# Patient Record
Sex: FEMALE | Race: WHITE | NOT HISPANIC OR LATINO | Employment: FULL TIME | URBAN - METROPOLITAN AREA
[De-identification: names, ages, dates, MRNs, and addresses within clinical notes are randomized per-mention and may not be internally consistent; named-entity substitution may affect disease eponyms.]

---

## 2017-09-21 ENCOUNTER — HOSPITAL ENCOUNTER (EMERGENCY)
Facility: MEDICAL CENTER | Age: 32
End: 2017-09-21
Attending: EMERGENCY MEDICINE
Payer: COMMERCIAL

## 2017-09-21 ENCOUNTER — APPOINTMENT (OUTPATIENT)
Dept: RADIOLOGY | Facility: MEDICAL CENTER | Age: 32
End: 2017-09-21
Attending: EMERGENCY MEDICINE
Payer: COMMERCIAL

## 2017-09-21 VITALS
WEIGHT: 149.69 LBS | RESPIRATION RATE: 16 BRPM | TEMPERATURE: 98.5 F | BODY MASS INDEX: 23.49 KG/M2 | SYSTOLIC BLOOD PRESSURE: 109 MMHG | HEIGHT: 67 IN | HEART RATE: 70 BPM | OXYGEN SATURATION: 98 % | DIASTOLIC BLOOD PRESSURE: 78 MMHG

## 2017-09-21 DIAGNOSIS — N83.209 HEMORRHAGIC OVARIAN CYST: ICD-10-CM

## 2017-09-21 DIAGNOSIS — N83.201 RIGHT OVARIAN CYST: ICD-10-CM

## 2017-09-21 DIAGNOSIS — R10.2 PELVIC PAIN: ICD-10-CM

## 2017-09-21 LAB
APPEARANCE UR: CLEAR
BILIRUB UR QL STRIP.AUTO: NEGATIVE
COLOR UR: YELLOW
CULTURE IF INDICATED INDCX: NO UA CULTURE
GLUCOSE UR STRIP.AUTO-MCNC: NEGATIVE MG/DL
HCG UR QL: NEGATIVE
KETONES UR STRIP.AUTO-MCNC: NEGATIVE MG/DL
LEUKOCYTE ESTERASE UR QL STRIP.AUTO: NEGATIVE
MICRO URNS: NORMAL
NITRITE UR QL STRIP.AUTO: NEGATIVE
PH UR STRIP.AUTO: 5.5 [PH]
PROT UR QL STRIP: NEGATIVE MG/DL
RBC UR QL AUTO: NEGATIVE
SP GR UR REFRACTOMETRY: 1.02
SP GR UR STRIP.AUTO: 1.02

## 2017-09-21 PROCEDURE — 76830 TRANSVAGINAL US NON-OB: CPT

## 2017-09-21 PROCEDURE — 81025 URINE PREGNANCY TEST: CPT

## 2017-09-21 PROCEDURE — 99284 EMERGENCY DEPT VISIT MOD MDM: CPT

## 2017-09-21 PROCEDURE — 96372 THER/PROPH/DIAG INJ SC/IM: CPT

## 2017-09-21 PROCEDURE — 81003 URINALYSIS AUTO W/O SCOPE: CPT

## 2017-09-21 PROCEDURE — 700111 HCHG RX REV CODE 636 W/ 250 OVERRIDE (IP): Performed by: EMERGENCY MEDICINE

## 2017-09-21 RX ORDER — KETOROLAC TROMETHAMINE 30 MG/ML
30 INJECTION, SOLUTION INTRAMUSCULAR; INTRAVENOUS ONCE
Status: COMPLETED | OUTPATIENT
Start: 2017-09-21 | End: 2017-09-21

## 2017-09-21 RX ORDER — HYDROCODONE BITARTRATE AND ACETAMINOPHEN 5; 325 MG/1; MG/1
1-2 TABLET ORAL EVERY 6 HOURS PRN
Qty: 16 TAB | Refills: 0 | Status: ON HOLD | OUTPATIENT
Start: 2017-09-21 | End: 2017-10-04

## 2017-09-21 RX ADMIN — KETOROLAC TROMETHAMINE 30 MG: 30 INJECTION, SOLUTION INTRAMUSCULAR at 19:02

## 2017-09-21 ASSESSMENT — PAIN SCALES - GENERAL: PAINLEVEL_OUTOF10: 4

## 2017-09-22 NOTE — ED PROVIDER NOTES
CHIEF COMPLAINT  Chief Complaint   Patient presents with   • Ovarian Cyst     right       HPI  Mckenzie Cash is a 32 y.o. female who presentsWith right adnexal pain. Reports that it is similar to prior episode of ovarian cysts. Has been monitored closely for ovarian cysts since May. Has had pelvic ultrasounds every 6 weeks since time of diagnosis. Last pelvic ultrasound was approximately 4 weeks ago. She states that throughout all of these ultrasounds studies, the ovarian cyst has been rather consistent and not resolved. She was followed by a gynecologist however the group where she was followed has disbanded and she was referred to a new gynecologist, to be seen in the next 2 weeks, Dr. Gonzalez. She notes that her prior insurance covered her at [facility] however this is no longer the case and she was referred to a [facility] affiliated gynecologist. Has not yet had discussions regarding excision of the cyst with a gynecologist.    She reports that over the past few days she has had worsening right adnexal pain. Denies any vaginal bleeding or discharge. Has a history of IUD placement and has not had menstrual cycle for approximately one year.    The patient denies fevers, nausea, vomiting. Was seen in an emergency department in August when she was prescribed Norco for her pain symptoms however she has only been taking them occasionally due to cognitive impairment while at work and while driving and while taking care of her children.    She does note that in her 20s, she had a laparoscopic procedure to evaluate for possible endometriosis. Showed scarring however no active lesions. Denies other abdominal surgeries in the past such as appendectomy.    REVIEW OF SYSTEMS  See HPI for further details. All other systems are negative.     PAST MEDICAL HISTORY   has a past medical history of Cold (6/28/11); Generalized headaches; Plantar fasciitis; and Tendonitis.    SOCIAL HISTORY  Social History     Social History Main  "Topics   • Smoking status: Former Smoker     Packs/day: 0.50     Years: 2.00     Types: Cigarettes     Quit date: 1/15/2011   • Smokeless tobacco: Not on file   • Alcohol use Yes      Comment: 3 per month   • Drug use: No   • Sexual activity: Not on file       SURGICAL HISTORY   has a past surgical history that includes laparoscopy (7/22/2011).    CURRENT MEDICATIONS  Home Medications    **Home medications have not yet been reviewed for this encounter**         ALLERGIES  Allergies   Allergen Reactions   • Nkda [No Known Drug Allergy]        PHYSICAL EXAM  VITAL SIGNS: /78   Pulse 83   Temp 36.9 °C (98.5 °F)   Resp 16   Ht 1.702 m (5' 7\")   Wt 67.9 kg (149 lb 11.1 oz)   SpO2 100%   BMI 23.45 kg/m²   Pulse ox interpretation: I interpret this pulse ox as normal.  Constitutional: Alert in no apparent distress.  HENT: No signs of trauma, Bilateral external ears normal, Nose normal.   Eyes: Pupils are equal and reactive, Conjunctiva normal, Non-icteric.   Neck: Normal range of motion, No tenderness, Supple, No stridor.   Lymphatic: No lymphadenopathy noted.   Cardiovascular: Regular rate and rhythm, no murmurs.   Thorax & Lungs: Normal breath sounds, No respiratory distress, No wheezing, No chest tenderness.   Abdomen: Bowel sounds normal, Soft, Right lower quadrant tenderness, No masses, No pulsatile masses. No peritoneal signs.  Skin: Warm, Dry, No erythema, No rash.   Back: No bony tenderness, No CVA tenderness.   Extremities: Intact distal pulses, No edema, No tenderness, No cyanosis  Musculoskeletal: Good range of motion in all major joints. No tenderness to palpation or major deformities noted.   Neurologic: Alert , Normal motor function and gait, Normal sensory function, No focal deficits noted.   Psychiatric: Affect normal, Judgment normal, Mood normal.       DIAGNOSTIC STUDIES / PROCEDURES      LABS  Labs Reviewed   URINALYSIS,CULTURE IF INDICATED   HCG QUALITATIVE UR   REFRACTOMETER SG "       RADIOLOGY  US-GYN-PELVIS TRANSVAGINAL   Final Result      2 cm hemorrhagic cyst identified in the right ovary.      IUD in place.          COURSE & MEDICAL DECISION MAKING  Pertinent Labs & Imaging studies reviewed. (See chart for details)  32 y.o. female presenting with chronic right lower quadrant tenderness for the past several months. Has been on and off however more frequent over the past several weeks. Has had multiple ultrasounds to evaluate her ovarian cyst that is known to her right adnexal region. She is not pregnant. Has an IUD in place.    Presentation is not consistent with appendicitis. No fever or vomiting. Has been coming and going for several months now. No vaginal bleeding or discharge. Unlikely TOA, PID. No presence of femoral hernia or other inguinal hernia.     Urinalysis does not show evidence of a urinary tract infection.    Ultrasound of the ovaries were obtained. No evidence of ovarian torsion. Did show 2 cm hemorrhagic cyst to the right ovary. This is likely the cause of the patient's abdominal discomfort at this time.She does have follow-up with a GYN physician within the next 2 weeks. She was instructed to follow up with that physician for further management. May require excision of her ovarian cyst if it continues to cause her discomfort. This should be discussed with GYN.    Given the patient's unremarkable vital signs and lack of current distress and no peritoneal findings or suggestion of surgical abdomen, no indication of CT at this time. She did have a prior history of possible endometriosis with a negative laparoscopy. This may be contributing to her symptoms as well.    Regarding this, the patient does appear stable for discharge at this time. Results were reviewed with the patient. She was instructed to follow-up with gynecologist regarding her ultrasound results and regarding further management. She was given strict return precautions however for any worsening of her  "symptoms or development of any other concerning signs or symptoms. She was informed that she does have a slightly increased chance of ovarian torsion given presence of cyst on the ovary. No evidence of this today.    The patient will not drink alcohol nor drive with prescribed medications.   The patient will return for worsening symptoms or failure of improvement and is stable at the time of discharge. The patient verbalizes understanding in their own words.    ,/78   Pulse 70   Temp 36.9 °C (98.5 °F)   Resp 16   Ht 1.702 m (5' 7\")   Wt 67.9 kg (149 lb 11.1 oz)   SpO2 98%   BMI 23.45 kg/m²     Rogerio Monaco M.D.  [address]  [place] [address]  [phone]    In 2 days      [facility], Emergency Dept  [address]  [facility] [address] [phone]    As needed, If symptoms worsen    Miriam Gonzalez M.D.  [address]  [address]  [place] [address]  [phone]    Schedule an appointment as soon as possible for a visit        FINAL IMPRESSION  1. Right ovarian cyst    2. Hemorrhagic ovarian cyst    3. Pelvic pain            Electronically signed by: Tony Plascencia, 9/21/2017 5:53 PM    "

## 2017-09-22 NOTE — ED NOTES
Pt rounding upon return from u/s. Vs as charted, states has taken approx 1600mg motrin today. Denies ability or need to take po pain meds (narc) at thistime. Await results

## 2017-09-22 NOTE — DISCHARGE INSTRUCTIONS
Pelvic Pain, Female  Female pelvic pain can be caused by many different things and start from a variety of places. Pelvic pain refers to pain that is located in the lower half of the abdomen and between your hips. The pain may occur over a short period of time (acute) or may be reoccurring (chronic). The cause of pelvic pain may be related to disorders affecting the female reproductive organs (gynecologic), but it may also be related to the bladder, kidney stones, an intestinal complication, or muscle or skeletal problems. Getting help right away for pelvic pain is important, especially if there has been severe, sharp, or a sudden onset of unusual pain. It is also important to get help right away because some types of pelvic pain can be life threatening.   CAUSES   Below are only some of the causes of pelvic pain. The causes of pelvic pain can be in one of several categories.   · Gynecologic.  ¨ Pelvic inflammatory disease.  ¨ Sexually transmitted infection.  ¨ Ovarian cyst or a twisted ovarian ligament (ovarian torsion).  ¨ Uterine lining that grows outside the uterus (endometriosis).  ¨ Fibroids, cysts, or tumors.  ¨ Ovulation.  · Pregnancy.  ¨ Pregnancy that occurs outside the uterus (ectopic pregnancy).  ¨ Miscarriage.  ¨ Labor.  ¨ Abruption of the placenta or ruptured uterus.  · Infection.  ¨ Uterine infection (endometritis).  ¨ Bladder infection.  ¨ Diverticulitis.  ¨ Miscarriage related to a uterine infection (septic ).  · Bladder.  ¨ Inflammation of the bladder (cystitis).  ¨ Kidney stone(s).  · Gastrointestinal.  ¨ Constipation.  ¨ Diverticulitis.  · Neurologic.  ¨ Trauma.  ¨ Feeling pelvic pain because of mental or emotional causes (psychosomatic).  · Cancers of the bowel or pelvis.  EVALUATION   Your caregiver will want to take a careful history of your concerns. This includes recent changes in your health, a careful gynecologic history of your periods (menses), and a sexual history. Obtaining  your family history and medical history is also important. Your caregiver may suggest a pelvic exam. A pelvic exam will help identify the location and severity of the pain. It also helps in the evaluation of which organ system may be involved. In order to identify the cause of the pelvic pain and be properly treated, your caregiver may order tests. These tests may include:   · A pregnancy test.  · Pelvic ultrasonography.  · An X-ray exam of the abdomen.  · A urinalysis or evaluation of vaginal discharge.  · Blood tests.  HOME CARE INSTRUCTIONS   · Only take over-the-counter or prescription medicines for pain, discomfort, or fever as directed by your caregiver.    · Rest as directed by your caregiver.    · Eat a balanced diet.    · Drink enough fluids to make your urine clear or pale yellow, or as directed.    · Avoid sexual intercourse if it causes pain.    · Apply warm or cold compresses to the lower abdomen depending on which one helps the pain.    · Avoid stressful situations.    · Keep a journal of your pelvic pain. Write down when it started, where the pain is located, and if there are things that seem to be associated with the pain, such as food or your menstrual cycle.  · Follow up with your caregiver as directed.    SEEK MEDICAL CARE IF:  · Your medicine does not help your pain.  · You have abnormal vaginal discharge.  SEEK IMMEDIATE MEDICAL CARE IF:   · You have heavy bleeding from the vagina.    · Your pelvic pain increases.    · You feel light-headed or faint.    · You have chills.    · You have pain with urination or blood in your urine.    · You have uncontrolled diarrhea or vomiting.    · You have a fever or persistent symptoms for more than 3 days.  · You have a fever and your symptoms suddenly get worse.    · You are being physically or sexually abused.    MAKE SURE YOU:  · Understand these instructions.  · Will watch your condition.  · Will get help if you are not doing well or get worse.     This  information is not intended to replace advice given to you by your health care provider. Make sure you discuss any questions you have with your health care provider.     Document Released: 11/14/2005 Document Revised: 05/03/2016 Document Reviewed: 04/08/2013  [employer] Interactive Patient Education ©2016 [employer]

## 2017-09-22 NOTE — ED NOTES
Dc instructions and prescription reviewed with pt. To f/u with pcp and gyn, return for worsening s/s

## 2017-09-22 NOTE — ED NOTES
Piv, epidural and urinary cath noted in [facility] not present on this er visit, therefore dc'd in epic at this time

## 2017-10-02 DIAGNOSIS — Z01.812 PRE-OPERATIVE LABORATORY EXAMINATION: ICD-10-CM

## 2017-10-02 LAB
ANION GAP SERPL CALC-SCNC: 9 MMOL/L (ref 0–11.9)
BASOPHILS # BLD AUTO: 0.5 % (ref 0–1.8)
BASOPHILS # BLD: 0.04 K/UL (ref 0–0.12)
BUN SERPL-MCNC: 12 MG/DL (ref 8–22)
CALCIUM SERPL-MCNC: 9.7 MG/DL (ref 8.5–10.5)
CHLORIDE SERPL-SCNC: 108 MMOL/L (ref 96–112)
CO2 SERPL-SCNC: 24 MMOL/L (ref 20–33)
CREAT SERPL-MCNC: 0.76 MG/DL (ref 0.5–1.4)
EOSINOPHIL # BLD AUTO: 0.07 K/UL (ref 0–0.51)
EOSINOPHIL NFR BLD: 0.9 % (ref 0–6.9)
ERYTHROCYTE [DISTWIDTH] IN BLOOD BY AUTOMATED COUNT: 41.1 FL (ref 35.9–50)
GFR SERPL CREATININE-BSD FRML MDRD: >60 ML/MIN/1.73 M 2
GLUCOSE SERPL-MCNC: 77 MG/DL (ref 65–99)
HCG SERPL QL: NEGATIVE
HCT VFR BLD AUTO: 42.5 % (ref 37–47)
HGB BLD-MCNC: 14.2 G/DL (ref 12–16)
IMM GRANULOCYTES # BLD AUTO: 0.02 K/UL (ref 0–0.11)
IMM GRANULOCYTES NFR BLD AUTO: 0.3 % (ref 0–0.9)
LYMPHOCYTES # BLD AUTO: 1.93 K/UL (ref 1–4.8)
LYMPHOCYTES NFR BLD: 25.2 % (ref 22–41)
MCH RBC QN AUTO: 29.8 PG (ref 27–33)
MCHC RBC AUTO-ENTMCNC: 33.4 G/DL (ref 33.6–35)
MCV RBC AUTO: 89.3 FL (ref 81.4–97.8)
MONOCYTES # BLD AUTO: 0.53 K/UL (ref 0–0.85)
MONOCYTES NFR BLD AUTO: 6.9 % (ref 0–13.4)
NEUTROPHILS # BLD AUTO: 5.06 K/UL (ref 2–7.15)
NEUTROPHILS NFR BLD: 66.2 % (ref 44–72)
NRBC # BLD AUTO: 0 K/UL
NRBC BLD AUTO-RTO: 0 /100 WBC
PLATELET # BLD AUTO: 243 K/UL (ref 164–446)
PMV BLD AUTO: 10.9 FL (ref 9–12.9)
POTASSIUM SERPL-SCNC: 3.9 MMOL/L (ref 3.6–5.5)
RBC # BLD AUTO: 4.76 M/UL (ref 4.2–5.4)
SODIUM SERPL-SCNC: 141 MMOL/L (ref 135–145)
WBC # BLD AUTO: 7.7 K/UL (ref 4.8–10.8)

## 2017-10-02 PROCEDURE — 36415 COLL VENOUS BLD VENIPUNCTURE: CPT

## 2017-10-02 PROCEDURE — 85025 COMPLETE CBC W/AUTO DIFF WBC: CPT

## 2017-10-02 PROCEDURE — 84703 CHORIONIC GONADOTROPIN ASSAY: CPT

## 2017-10-02 PROCEDURE — 80048 BASIC METABOLIC PNL TOTAL CA: CPT

## 2017-10-04 ENCOUNTER — HOSPITAL ENCOUNTER (OUTPATIENT)
Facility: MEDICAL CENTER | Age: 32
End: 2017-10-04
Attending: OBSTETRICS & GYNECOLOGY | Admitting: OBSTETRICS & GYNECOLOGY
Payer: COMMERCIAL

## 2017-10-04 VITALS
BODY MASS INDEX: 22.91 KG/M2 | HEART RATE: 89 BPM | SYSTOLIC BLOOD PRESSURE: 99 MMHG | TEMPERATURE: 97 F | HEIGHT: 67 IN | DIASTOLIC BLOOD PRESSURE: 68 MMHG | WEIGHT: 145.94 LBS | RESPIRATION RATE: 18 BRPM | OXYGEN SATURATION: 98 %

## 2017-10-04 PROBLEM — N83.291 COMPLEX CYST OF RIGHT OVARY: Status: ACTIVE | Noted: 2017-10-04

## 2017-10-04 PROCEDURE — 500025 HCHG ARMREST, CRADLE FOAM: Performed by: OBSTETRICS & GYNECOLOGY

## 2017-10-04 PROCEDURE — 160047 HCHG PACU  - EA ADDL 30 MINS PHASE II: Performed by: OBSTETRICS & GYNECOLOGY

## 2017-10-04 PROCEDURE — 700104 HCHG RX REV CODE 254

## 2017-10-04 PROCEDURE — 700101 HCHG RX REV CODE 250

## 2017-10-04 PROCEDURE — 160041 HCHG SURGERY MINUTES - EA ADDL 1 MIN LEVEL 4: Performed by: OBSTETRICS & GYNECOLOGY

## 2017-10-04 PROCEDURE — 700111 HCHG RX REV CODE 636 W/ 250 OVERRIDE (IP)

## 2017-10-04 PROCEDURE — 160046 HCHG PACU - 1ST 60 MINS PHASE II: Performed by: OBSTETRICS & GYNECOLOGY

## 2017-10-04 PROCEDURE — 700102 HCHG RX REV CODE 250 W/ 637 OVERRIDE(OP)

## 2017-10-04 PROCEDURE — 160025 RECOVERY II MINUTES (STATS): Performed by: OBSTETRICS & GYNECOLOGY

## 2017-10-04 PROCEDURE — 501838 HCHG SUTURE GENERAL: Performed by: OBSTETRICS & GYNECOLOGY

## 2017-10-04 PROCEDURE — 160009 HCHG ANES TIME/MIN: Performed by: OBSTETRICS & GYNECOLOGY

## 2017-10-04 PROCEDURE — 160029 HCHG SURGERY MINUTES - 1ST 30 MINS LEVEL 4: Performed by: OBSTETRICS & GYNECOLOGY

## 2017-10-04 PROCEDURE — 501572 HCHG TROCAR, SHIELD OBTU 5X100: Performed by: OBSTETRICS & GYNECOLOGY

## 2017-10-04 PROCEDURE — 160036 HCHG PACU - EA ADDL 30 MINS PHASE I: Performed by: OBSTETRICS & GYNECOLOGY

## 2017-10-04 PROCEDURE — 160002 HCHG RECOVERY MINUTES (STAT): Performed by: OBSTETRICS & GYNECOLOGY

## 2017-10-04 PROCEDURE — 160048 HCHG OR STATISTICAL LEVEL 1-5: Performed by: OBSTETRICS & GYNECOLOGY

## 2017-10-04 PROCEDURE — A9270 NON-COVERED ITEM OR SERVICE: HCPCS

## 2017-10-04 PROCEDURE — 500868 HCHG NEEDLE, SURGI(VARES): Performed by: OBSTETRICS & GYNECOLOGY

## 2017-10-04 PROCEDURE — 160035 HCHG PACU - 1ST 60 MINS PHASE I: Performed by: OBSTETRICS & GYNECOLOGY

## 2017-10-04 PROCEDURE — A4314 CATH W/DRAINAGE 2-WAY LATEX: HCPCS | Performed by: OBSTETRICS & GYNECOLOGY

## 2017-10-04 PROCEDURE — 501688 HCHG UTERINE MANIPULATOR RUMI: Performed by: OBSTETRICS & GYNECOLOGY

## 2017-10-04 PROCEDURE — 500886 HCHG PACK, LAPAROSCOPY: Performed by: OBSTETRICS & GYNECOLOGY

## 2017-10-04 PROCEDURE — 501582 HCHG TROCAR, THRD BLADED: Performed by: OBSTETRICS & GYNECOLOGY

## 2017-10-04 RX ORDER — LIDOCAINE AND PRILOCAINE 25; 25 MG/G; MG/G
1 CREAM TOPICAL
Status: COMPLETED | OUTPATIENT
Start: 2017-10-04 | End: 2017-10-04

## 2017-10-04 RX ORDER — SODIUM CHLORIDE, SODIUM LACTATE, POTASSIUM CHLORIDE, CALCIUM CHLORIDE 600; 310; 30; 20 MG/100ML; MG/100ML; MG/100ML; MG/100ML
INJECTION, SOLUTION INTRAVENOUS CONTINUOUS
Status: DISCONTINUED | OUTPATIENT
Start: 2017-10-04 | End: 2017-10-04 | Stop reason: HOSPADM

## 2017-10-04 RX ORDER — OXYCODONE HCL 5 MG/5 ML
SOLUTION, ORAL ORAL
Status: COMPLETED
Start: 2017-10-04 | End: 2017-10-04

## 2017-10-04 RX ORDER — KETOROLAC TROMETHAMINE 30 MG/ML
INJECTION, SOLUTION INTRAMUSCULAR; INTRAVENOUS
Status: COMPLETED
Start: 2017-10-04 | End: 2017-10-04

## 2017-10-04 RX ORDER — SIMETHICONE 80 MG
80 TABLET,CHEWABLE ORAL EVERY 8 HOURS PRN
Status: DISCONTINUED | OUTPATIENT
Start: 2017-10-04 | End: 2017-10-04 | Stop reason: HOSPADM

## 2017-10-04 RX ORDER — LIDOCAINE HYDROCHLORIDE 10 MG/ML
0.5 INJECTION, SOLUTION INFILTRATION; PERINEURAL
Status: COMPLETED | OUTPATIENT
Start: 2017-10-04 | End: 2017-10-04

## 2017-10-04 RX ORDER — ONDANSETRON 2 MG/ML
INJECTION INTRAMUSCULAR; INTRAVENOUS
Status: COMPLETED
Start: 2017-10-04 | End: 2017-10-04

## 2017-10-04 RX ORDER — ONDANSETRON 2 MG/ML
4 INJECTION INTRAMUSCULAR; INTRAVENOUS EVERY 6 HOURS PRN
Status: DISCONTINUED | OUTPATIENT
Start: 2017-10-04 | End: 2017-10-04 | Stop reason: HOSPADM

## 2017-10-04 RX ORDER — BUPIVACAINE HYDROCHLORIDE AND EPINEPHRINE 2.5; 5 MG/ML; UG/ML
INJECTION, SOLUTION EPIDURAL; INFILTRATION; INTRACAUDAL; PERINEURAL
Status: DISCONTINUED | OUTPATIENT
Start: 2017-10-04 | End: 2017-10-04 | Stop reason: HOSPADM

## 2017-10-04 RX ORDER — IBUPROFEN 200 MG
600 TABLET ORAL EVERY 6 HOURS PRN
Status: DISCONTINUED | OUTPATIENT
Start: 2017-10-04 | End: 2017-10-04 | Stop reason: HOSPADM

## 2017-10-04 RX ORDER — METOCLOPRAMIDE HYDROCHLORIDE 5 MG/ML
10 INJECTION INTRAMUSCULAR; INTRAVENOUS EVERY 4 HOURS PRN
Status: DISCONTINUED | OUTPATIENT
Start: 2017-10-04 | End: 2017-10-04 | Stop reason: HOSPADM

## 2017-10-04 RX ORDER — LIDOCAINE HYDROCHLORIDE 10 MG/ML
INJECTION, SOLUTION INFILTRATION; PERINEURAL
Status: COMPLETED
Start: 2017-10-04 | End: 2017-10-04

## 2017-10-04 RX ORDER — HYDROCODONE BITARTRATE AND ACETAMINOPHEN 5; 325 MG/1; MG/1
2 TABLET ORAL EVERY 6 HOURS PRN
Status: DISCONTINUED | OUTPATIENT
Start: 2017-10-04 | End: 2017-10-04 | Stop reason: HOSPADM

## 2017-10-04 RX ORDER — HYDROCODONE BITARTRATE AND ACETAMINOPHEN 5; 325 MG/1; MG/1
1 TABLET ORAL EVERY 4 HOURS PRN
Status: DISCONTINUED | OUTPATIENT
Start: 2017-10-04 | End: 2017-10-04 | Stop reason: HOSPADM

## 2017-10-04 RX ORDER — PROMETHAZINE HYDROCHLORIDE 25 MG/1
12.5 SUPPOSITORY RECTAL EVERY 4 HOURS PRN
Status: DISCONTINUED | OUTPATIENT
Start: 2017-10-04 | End: 2017-10-04 | Stop reason: HOSPADM

## 2017-10-04 RX ADMIN — KETOROLAC TROMETHAMINE 30 MG: 30 INJECTION, SOLUTION INTRAMUSCULAR at 16:13

## 2017-10-04 RX ADMIN — FENTANYL CITRATE 50 MCG: 50 INJECTION, SOLUTION INTRAMUSCULAR; INTRAVENOUS at 16:01

## 2017-10-04 RX ADMIN — LIDOCAINE HYDROCHLORIDE 0.5 ML: 10 INJECTION, SOLUTION INFILTRATION; PERINEURAL at 13:24

## 2017-10-04 RX ADMIN — HYDROMORPHONE HYDROCHLORIDE 0.5 MG: 1 INJECTION, SOLUTION INTRAMUSCULAR; INTRAVENOUS; SUBCUTANEOUS at 16:40

## 2017-10-04 RX ADMIN — ONDANSETRON 4 MG: 2 INJECTION INTRAMUSCULAR; INTRAVENOUS at 16:16

## 2017-10-04 RX ADMIN — SODIUM CHLORIDE, SODIUM LACTATE, POTASSIUM CHLORIDE, CALCIUM CHLORIDE: 600; 310; 30; 20 INJECTION, SOLUTION INTRAVENOUS at 13:24

## 2017-10-04 RX ADMIN — OXYCODONE HYDROCHLORIDE 10 MG: 5 SOLUTION ORAL at 16:38

## 2017-10-04 RX ADMIN — FENTANYL CITRATE 50 MCG: 50 INJECTION, SOLUTION INTRAMUSCULAR; INTRAVENOUS at 16:10

## 2017-10-04 ASSESSMENT — PAIN SCALES - GENERAL
PAINLEVEL_OUTOF10: 8
PAINLEVEL_OUTOF10: 4
PAINLEVEL_OUTOF10: 0
PAINLEVEL_OUTOF10: ASSUMED PAIN PRESENT
PAINLEVEL_OUTOF10: 3
PAINLEVEL_OUTOF10: 4

## 2017-10-04 NOTE — OP REPORT
DATE OF SERVICE:  10/04/2017    PREOPERATIVE DIAGNOSES:  Right lower quadrant pain, dyspareunia, and history   of endometriosis.    POSTOPERATIVE DIAGNOSES:  Right lower quadrant pain, dyspareunia, and history   of endometriosis.    PROCEDURE PERFORMED:  Pelviscopy with fulguration of endometriosis on the   right ovary.    SURGEON:  Eliza Carrizales MD    ASSISTANT:  LYNNETTE Steve    ANESTHESIOLOGIST:  Deon Lewis MD    ANESTHESIA:  General endotracheal.    ESTIMATED BLOOD LOSS:  None.    SPECIMEN:  None.    TECHNIQUE:  The patient was taken to the operating room where general   anesthesia was placed.  She was then placed in the [facility] with   excellent positioning, prepped and draped in the normal sterile fashion.  A   Kristin manipulator was then placed on the uterus for manipulation and attention   was then turned to the abdomen where 0.25% Marcaine with epinephrine was then   injected at the base of the umbilicus.  A curvilinear incision was made on the   lower aspect of the umbilicus following hairlines.  The Veress needle placed,   pneumoperitoneum created with CO2 gas.  The trocar was introduced, the above   findings were noted.  A 5 mm incision was made suprapubically.  This was   introduced without difficulty and the above findings were noted.  There were a   few brown or hemosiderin type areas on the surface of the ovary that were   fulgurated using the Kleppinger bipolar cautery and there was a scant amount   of serosanguineous fluid present, which was of course irrigated out.    Otherwise, there were no obvious cysts on the ovaries.  The uterosacral   ligaments looked clean.  There was no obvious endometriosis.  Everything   looked very good and very normal.  The pneumoperitoneum was released and the   trocars were removed.  The umbilical fascia was reapproximated with 0 Vicryl   and skin with Dermabond.  Excellent reapproximation was achieved.  The Kristin   manipulator was then  removed.  The IUD string was in the appropriate location   and there was no bleeding.  The patient tolerated the procedure very well and   was brought to recovery room in stable condition.       ____________________________________     MD YULI ABREU / MAX    DD:  10/04/2017 16:02:19  DT:  10/04/2017 16:19:16    D#:  [id number]  Job#:  [id number]

## 2017-10-04 NOTE — H&P
DATE OF OPERATION:  10/04/2017    CHIEF COMPLAINT:  Pelvic pain, right 2.4x2.1x2.6 cm hypoechoic lesion with   internal echoes on the right ovary, seen in ER multiple times for pain,   history of endometriosis.    HISTORY OF PRESENT ILLNESS:  The patient is a 32-year-old  using Mirena   IUD for birth control, who presented to our office after several ER visits for   followup of pelvic pain and a right ovarian cyst.  The patient states that   she had confirmed endometriosis on laparoscopy with Dr. Raza 8-9 years   ago and then in May, started having intermittent pain and was seen in the   emergency room 3 times.  The patient was found on her last ultrasound done on   17 to have a uterus measuring 8.2x3.4x3.8 cm with a right ovary measuring   3x4 cm with a 2.5 cm hypoechoic lesion with internal echoes noted.  This was   thought to be an endometrioma or hemorrhagic cyst.  The patient has had   continued pelvic pain, despite placing her on Aygestin and is interested in   proceeding with definitive surgical management.  She has elected first to   proceed with a pelviscopy and removal of the right ovarian cyst and any   implants of endometriosis.  Risks, benefits, alternatives and procedure were   discussed with the patient in detail and she agrees to proceed.    PAST MEDICAL HISTORY:  Kidney infection in 2014 and hospitalized for 4 days.    History of chlamydia in  and .    PAST SURGICAL HISTORY:  Laparoscopy in  with endometriosis diagnosed.    MENSTRUAL HISTORY:  The patient underwent menarche at age 15.  She has periods   that last for 7 days and come every 28 days.    PAST OBSTETRICAL HISTORY:  She has had 2 pregnancies, both born vaginally, the   last one in .    MEDICATIONS:  Mirena IUD.    FAMILY HISTORY:  Unknown.    SOCIAL HISTORY:  The patient does not smoke, drinks about 2 drinks a month.    Does not do recreational drugs.    ALLERGIES:  No known drug allergies.    PHYSICAL  EXAMINATION:  VITAL SIGNS:  The patient's blood pressure is 108/70, her weight is 147.8.    Please see Epic vitals.  HEENT:  Within normal limits.  NECK:  Supple, without lymphadenopathy or thyromegaly.  CARDIOVASCULAR:  Regular rate and rhythm.  LUNGS:  Clear to auscultation.  BACK:  No CVA tenderness.  ABDOMEN:  Soft and nontender.  There is some mild right lower quadrant   tenderness.  No rebound or guarding.  PELVIC:  EGBUS appears normal.  Vagina appears normal.  Cervix appears to have   no lesions.  Uterus was firm and nontender, small in size.  There were no   adnexal masses, but mild to moderate adnexal tenderness.  EXTREMITIES:  Benign.    ASSESSMENT:  Right lower quadrant pain for the last 4 months, history of   endometriosis confirmed by laparoscopy 9 years ago, now with a right ovarian   cyst for the last 4 months, question endometrioma to about 2 cm in diameter.    She had a similar finding on her CT scan in 2014.    PLAN:  The patient is scheduled for a laparoscopy with excision of right   ovarian cyst and endometriosis.  Risks, benefits, alternatives and procedure   were discussed with the patient in detail and she agrees to proceed.    Preoperative labs will be obtained.  Preoperative antibiotics will be   administered.  She was given a prescription for Norco 5/325, #40.  If she has   any problems or questions, she can contact my office.       ____________________________________     MD YULI ABREU / MAX    DD:  10/03/2017 22:43:35  DT:  10/03/2017 23:17:22    D#:  [id number]  Job#:  [id number]

## 2017-10-04 NOTE — OR SURGEON
Operative Report    PreOp Diagnosis: Right lower quadrant pain, dyspareunia, h/o endometriosis    PostOp Diagnosis: Same    Procedure(s):  PELVISCOPY - Wound Class: Clean Contaminated  FULGURATION OF ENDOMETRIOSIS    Surgeon(s):  Eliza Carrizales M.D.    Anesthesiologist/Type of Anesthesia:  Anesthesiologist: Deon Lewis M.D./General    Surgical Staff:  Circulator: Avelino Roy R.N.  Scrub Person: Parisa Street  Private Scrub: Kapil Araujo R.N.    Specimens:  * No specimens in log *    Estimated Blood Loss: 0 cc    Findings: Uterus is normal size and antemerted, normal tubes and ovaries, right ovary with small implant of endometriosis was fulgurated, slight serosanquinous fluid, uterosacral ligaments appear normal, no scar tissue    Complications: None        10/4/2017 3:57 PM Eliza Carrizales

## 2017-10-04 NOTE — H&P
DATE OF OPERATION:  10/04/2017    CHIEF COMPLAINT:  Pelvic pain, right 2.4x2.1x2.6 cm hypoechoic lesion with   internal echoes on the right ovary, seen in ER multiple times for pain,   history of endometriosis.    HISTORY OF PRESENT ILLNESS:  The patient is a 32-year-old  using Mirena   IUD for birth control, who presented to our office after several ER visits for   followup of pelvic pain and a right ovarian cyst.  The patient states that   she had confirmed endometriosis on laparoscopy with Dr. Raza 8-9 years   ago and then in May, started having intermittent pain and was seen in the   emergency room 3 times.  The patient was found on her last ultrasound done on   17 to have a uterus measuring 8.2x3.4x3.8 cm with a right ovary measuring   3x4 cm with a 2.5 cm hypoechoic lesion with internal echoes noted.  This was   thought to be an endometrioma or hemorrhagic cyst.  The patient has had   continued pelvic pain, despite placing her on Aygestin and is interested in   proceeding with definitive surgical management.  She has elected first to   proceed with a pelviscopy and removal of the right ovarian cyst and any   implants of endometriosis.  Risks, benefits, alternatives and procedure were   discussed with the patient in detail and she agrees to proceed.    PAST MEDICAL HISTORY:  Kidney infection in 2014 and hospitalized for 4 days.    History of chlamydia in  and .    PAST SURGICAL HISTORY:  Laparoscopy in  with endometriosis diagnosed.    MENSTRUAL HISTORY:  The patient underwent menarche at age 15.  She has periods   that last for 7 days and come every 28 days.    PAST OBSTETRICAL HISTORY:  She has had 2 pregnancies, both born vaginally, the   last one in .    MEDICATIONS:  Mirena IUD.    FAMILY HISTORY:  Unknown.    SOCIAL HISTORY:  The patient does not smoke, drinks about 2 drinks a month.    Does not do recreational drugs.    ALLERGIES:  No known drug allergies.    PHYSICAL  EXAMINATION:  VITAL SIGNS:  The patient's blood pressure is 108/70, her weight is 147.8.    Please see Epic vitals.  HEENT:  Within normal limits.  NECK:  Supple, without lymphadenopathy or thyromegaly.  CARDIOVASCULAR:  Regular rate and rhythm.  LUNGS:  Clear to auscultation.  BACK:  No CVA tenderness.  ABDOMEN:  Soft and nontender.  There is some mild right lower quadrant   tenderness.  No rebound or guarding.  PELVIC:  EGBUS appears normal.  Vagina appears normal.  Cervix appears to have   no lesions.  Uterus was firm and nontender, small in size.  There were no   adnexal masses, but mild to moderate adnexal tenderness.  EXTREMITIES:  Benign.    ASSESSMENT:  Right lower quadrant pain for the last 4 months, history of   endometriosis confirmed by laparoscopy 9 years ago, now with a right ovarian   cyst for the last 4 months, question endometrioma to about 2 cm in diameter.    She had a similar finding on her CT scan in 2014.    PLAN:  The patient is scheduled for a laparoscopy with excision of right   ovarian cyst and endometriosis.  Risks, benefits, alternatives and procedure   were discussed with the patient in detail and she agrees to proceed.    Preoperative labs will be obtained.  Preoperative antibiotics will be   administered.  She was given a prescription for Norco 5/325, #40.  If she has   any problems or questions, she can contact my office.       ____________________________________     MD YULI ABREU / MAX    DD:  10/03/2017 22:43:35  DT:  10/03/2017 23:17:22    D#:  [id number]  Job#:  [id number]

## 2017-10-05 NOTE — OR NURSING
VSS, pt steady with ambulation, meets discharge criteria. Discharged home with . Wheeled to car with hospital escort. Brenda CDI. IV dc'd, cathlon intact. Pt to f/u with physician as directed by physician. Pt to return to ER for any emergent sx. Pt verbalizes understanding of discharge instructions and all questions were answered. Pt was having gas pains, but felt better after walking and changing position.

## 2017-10-05 NOTE — DISCHARGE INSTRUCTIONS
ACTIVITY: Rest and take it easy for the first 24 hours.  A responsible adult is recommended to remain with you during that time.  It is normal to feel sleepy.  We encourage you to not do anything that requires balance, judgment or coordination.    MILD FLU-LIKE SYMPTOMS ARE NORMAL. YOU MAY EXPERIENCE GENERALIZED MUSCLE ACHES, THROAT IRRITATION, HEADACHE AND/OR SOME NAUSEA.    FOR 24 HOURS DO NOT:  Drive, operate machinery or run household appliances.  Drink beer or alcoholic beverages.   Make important decisions or sign legal documents.    SPECIAL INSTRUCTIONS: **apply ice to affected area for comfort.  Keep bowels soft and regular.  Take over the counter Colace  2 times daily as needed for constipation.  May take Milk of Magnesia as needed for constipation. Maintain pelvic rest for 6 weeks or until your surgeon gives approval. Ice left and right lateral incisions Call your doctor before going to the Emergency Department or if:  temp greater than 100.4 F, severe pain, more than light spotting or drainage, redness of incision(s)    DIET: To avoid nausea, slowly advance diet as tolerated, avoiding spicy or greasy foods for the first day.  Add more substantial food to your diet according to your physician's instructions.  Babies can be fed formula or breast milk as soon as they are hungry.  INCREASE FLUIDS AND FIBER TO AVOID CONSTIPATION.    SURGICAL DRESSING/BATHING: *may shower tomorrow.  May bathe.    FOLLOW-UP APPOINTMENT:  A follow-up appointment should be arranged with your doctor.  Call to schedule.    You should CALL YOUR PHYSICIAN if you develop:  Fever greater than 101 degrees F.  Pain not relieved by medication, or persistent nausea or vomiting.  Excessive bleeding (blood soaking through dressing) or unexpected drainage from the wound.  Extreme redness or swelling around the incision site, drainage of pus or foul smelling drainage.  Inability to urinate or empty your bladder within 8 hours.  Problems with  breathing or chest pain.    You should call 911 if you develop problems with breathing or chest pain.  If you are unable to contact your doctor or surgical center, you should go to the nearest emergency room or urgent care center.  Physician's telephone #: *Dr. Carrizales [phone]**    If any questions arise, call your doctor.  If your doctor is not available, please feel free to call the Surgical Center at [phone].  The Center is open Monday through Friday from 7AM to 7PM.  You can also call the HEALTH HOTLINE open 24 hours/day, 7 days/week and speak to a nurse at [phone], or toll free at [phone].    A registered nurse may call you a few days after your surgery to see how you are doing after your procedure.    MEDICATIONS: Resume taking daily medication.  Take prescribed pain medication with food.  If no medication is prescribed, you may take non-aspirin pain medication if needed.  PAIN MEDICATION CAN BE VERY CONSTIPATING.  Take a stool softener or laxative such as senokot, pericolace, or milk of magnesia if needed.    Last pain medication given at 4:38pm.    If your physician has prescribed pain medication that includes Acetaminophen (Tylenol), do not take additional Acetaminophen (Tylenol) while taking the prescribed medication.    Depression / Suicide Risk    As you are discharged from this [facility] facility, it is important to learn how to keep safe from harming yourself.    Recognize the warning signs:  · Abrupt changes in personality, positive or negative- including increase in energy   · Giving away possessions  · Change in eating patterns- significant weight changes-  positive or negative  · Change in sleeping patterns- unable to sleep or sleeping all the time   · Unwillingness or inability to communicate  · Depression  · Unusual sadness, discouragement and loneliness  · Talk of wanting to die  · Neglect of personal appearance   · Rebelliousness- reckless behavior  · Withdrawal from  people/activities they love  · Confusion- inability to concentrate     If you or a loved one observes any of these behaviors or has concerns about self-harm, here's what you can do:  · Talk about it- your feelings and reasons for harming yourself  · Remove any means that you might use to hurt yourself (examples: pills, rope, extension cords, firearm)  · Get professional help from the community (Mental Health, Substance Abuse, psychological counseling)  · Do not be alone:Call your Safe Contact- someone whom you trust who will be there for you.  · Call your local CRISIS HOTLINE 856-7161 or 632-273-1332  · Call your local Children's Mobile Crisis Response Team [place] (342) 157-7070 or www.[place]  · Call the toll free National Suicide Prevention Hotlines   · National Suicide Prevention Lifeline 740-927-PGRN (9071)  · National Hope Line Network 800-SUICIDE (783-2655)

## 2017-10-14 ENCOUNTER — OFFICE VISIT (OUTPATIENT)
Dept: URGENT CARE | Facility: PHYSICIAN GROUP | Age: 32
End: 2017-10-14
Payer: COMMERCIAL

## 2017-10-14 VITALS
TEMPERATURE: 97.5 F | OXYGEN SATURATION: 98 % | SYSTOLIC BLOOD PRESSURE: 98 MMHG | WEIGHT: 145 LBS | BODY MASS INDEX: 23.3 KG/M2 | RESPIRATION RATE: 16 BRPM | DIASTOLIC BLOOD PRESSURE: 68 MMHG | HEIGHT: 66 IN | HEART RATE: 88 BPM

## 2017-10-14 DIAGNOSIS — J02.9 ACUTE PHARYNGITIS, UNSPECIFIED ETIOLOGY: Primary | ICD-10-CM

## 2017-10-14 LAB
INT CON NEG: NEGATIVE
INT CON POS: POSITIVE
S PYO AG THROAT QL: POSITIVE

## 2017-10-14 PROCEDURE — 99203 OFFICE O/P NEW LOW 30 MIN: CPT | Performed by: NURSE PRACTITIONER

## 2017-10-14 PROCEDURE — 87880 STREP A ASSAY W/OPTIC: CPT | Performed by: NURSE PRACTITIONER

## 2017-10-14 RX ORDER — AMOXICILLIN 500 MG/1
500 CAPSULE ORAL 2 TIMES DAILY
Qty: 20 CAP | Refills: 0 | Status: SHIPPED | OUTPATIENT
Start: 2017-10-14 | End: 2019-03-23

## 2017-10-14 ASSESSMENT — ENCOUNTER SYMPTOMS
TROUBLE SWALLOWING: 1
COUGH: 0
HEADACHES: 1
SHORTNESS OF BREATH: 0
WEAKNESS: 0
MYALGIAS: 1
CHILLS: 0
VOMITING: 0
SPUTUM PRODUCTION: 0
NAUSEA: 0
SWOLLEN GLANDS: 1
DIARRHEA: 0
FEVER: 0
SORE THROAT: 1

## 2017-10-14 NOTE — PROGRESS NOTES
"Subjective:      Mckenzie Cash is a 32 y.o. female who presents with Nasal Congestion (4 days )            Medications, Allergies and Prior Medical Hx reviewed and updated in [facility].with patient/family today         Pharyngitis    This is a new problem. The current episode started in the past 7 days (4 days). The problem has been gradually worsening. The pain is at a severity of 4/10. Associated symptoms include congestion, headaches, swollen glands and trouble swallowing. Pertinent negatives include no coughing, diarrhea, ear discharge, ear pain, shortness of breath or vomiting. She has tried NSAIDs for the symptoms. The treatment provided mild relief.       Review of Systems   Constitutional: Positive for malaise/fatigue. Negative for chills and fever.   HENT: Positive for congestion, sore throat and trouble swallowing. Negative for ear discharge and ear pain.    Respiratory: Negative for cough, sputum production and shortness of breath.    Gastrointestinal: Negative for diarrhea, nausea and vomiting.   Musculoskeletal: Positive for myalgias.   Neurological: Positive for headaches. Negative for weakness.          Objective:     BP (!) 98/68   Pulse 88   Temp 36.4 °C (97.5 °F)   Resp 16   Ht 1.676 m (5' 5.98\")   Wt 65.8 kg (145 lb)   LMP  (LMP Unknown)   SpO2 98%   Breastfeeding? No   BMI 23.41 kg/m²      Physical Exam   Constitutional: She appears well-developed and well-nourished. No distress.   HENT:   Head: Normocephalic and atraumatic.   Right Ear: Tympanic membrane and ear canal normal.   Left Ear: Tympanic membrane and ear canal normal.   Nose: Rhinorrhea present.   Mouth/Throat: Uvula is midline and mucous membranes are normal. No trismus in the jaw. No uvula swelling. Posterior oropharyngeal edema and posterior oropharyngeal erythema present. No oropharyngeal exudate.   Eyes: Conjunctivae are normal. Pupils are equal, round, and reactive to light.   Neck: Neck supple.   Cardiovascular: " Normal rate, regular rhythm and normal heart sounds.    Pulmonary/Chest: Effort normal and breath sounds normal. No respiratory distress. She has no wheezes.   Lymphadenopathy:     She has cervical adenopathy.   Neurological: She is alert.   Skin: Skin is warm and dry.   Psychiatric: She has a normal mood and affect. Her behavior is normal.   Vitals reviewed.              Assessment/Plan:     1. Acute pharyngitis, unspecified etiology  POCT Rapid Strep A    amoxicillin (AMOXIL) 500 MG Cap         Rapid Strep - positive    Epic generated written imformation provided along with verbal instructions for strep pharyngitis    Letter written for 1 days off of school/work    Rest, Fluids, tylenol, ibuprofen, otc throat lozenges, gargle with warm salt water,   Pt will go to the ER for worsening or changing symptoms as discussed,  Follow-up with your primary care provider or return here if not improving in 5 days   Discharge instructions discussed with pt/family who verbalize understanding and agreement with poc

## 2017-10-14 NOTE — LETTER
October 14, 2017       Patient: Mckenzie Cash   YOB: 1985   Date of Visit: 10/14/2017         To Whom It May Concern:    It is my medical opinion that Mckenzie Cash should be off work today due to illness.       If you have any questions or concerns, please don't hesitate to call [phone]          Sincerely,          NIEVES Astorga.  Electronically Signed

## 2017-10-14 NOTE — PATIENT INSTRUCTIONS
"Strep Throat  Strep throat is an infection of the throat caused by a bacteria named Streptococcus pyogenes. Your health care provider may call the infection streptococcal \"tonsillitis\" or \"pharyngitis\" depending on whether there are signs of inflammation in the tonsils or back of the throat. Strep throat is most common in children aged 5-15 years during the cold months of the year, but it can occur in people of any age during any season. This infection is spread from person to person (contagious) through coughing, sneezing, or other close contact.  SIGNS AND SYMPTOMS   · Fever or chills.  · Painful, swollen, red tonsils or throat.  · Pain or difficulty when swallowing.  · White or yellow spots on the tonsils or throat.  · Swollen, tender lymph nodes or \"glands\" of the neck or under the jaw.  · Red rash all over the body (rare).  DIAGNOSIS   Many different infections can cause the same symptoms. A test must be done to confirm the diagnosis so the right treatment can be given. A \"rapid strep test\" can help your health care provider make the diagnosis in a few minutes. If this test is not available, a light swab of the infected area can be used for a throat culture test. If a throat culture test is done, results are usually available in a day or two.  TREATMENT   Strep throat is treated with antibiotic medicine.  HOME CARE INSTRUCTIONS   · Gargle with 1 tsp of salt in 1 cup of warm water, 3-4 times per day or as needed for comfort.  · Family members who also have a sore throat or fever should be tested for strep throat and treated with antibiotics if they have the strep infection.  · Make sure everyone in your household washes their hands well.  · Do not share food, drinking cups, or personal items that could cause the infection to spread to others.  · You may need to eat a soft food diet until your sore throat gets better.  · Drink enough water and fluids to keep your urine clear or pale yellow. This will help prevent " dehydration.  · Get plenty of rest.  · Stay home from school, day care, or work until you have been on antibiotics for 24 hours.  · Take medicines only as directed by your health care provider.  · Take your antibiotic medicine as directed by your health care provider. Finish it even if you start to feel better.  SEEK MEDICAL CARE IF:   · The glands in your neck continue to enlarge.  · You develop a rash, cough, or earache.  · You cough up green, yellow-brown, or bloody sputum.  · You have pain or discomfort not controlled by medicines.  · Your problems seem to be getting worse rather than better.  · You have a fever.  SEEK IMMEDIATE MEDICAL CARE IF:   · You develop any new symptoms such as vomiting, severe headache, stiff or painful neck, chest pain, shortness of breath, or trouble swallowing.  · You develop severe throat pain, drooling, or changes in your voice.  · You develop swelling of the neck, or the skin on the neck becomes red and tender.  · You develop signs of dehydration, such as fatigue, dry mouth, and decreased urination.  · You become increasingly sleepy, or you cannot wake up completely.  MAKE SURE YOU:  · Understand these instructions.  · Will watch your condition.  · Will get help right away if you are not doing well or get worse.     This information is not intended to replace advice given to you by your health care provider. Make sure you discuss any questions you have with your health care provider.     Document Released: 12/15/2001 Document Revised: 01/08/2016 Document Reviewed: 04/11/2016  [employer] Interactive Patient Education ©2016 [employer]

## 2017-12-15 ENCOUNTER — OFFICE VISIT (OUTPATIENT)
Dept: URGENT CARE | Facility: PHYSICIAN GROUP | Age: 32
End: 2017-12-15
Payer: COMMERCIAL

## 2017-12-15 VITALS
SYSTOLIC BLOOD PRESSURE: 106 MMHG | DIASTOLIC BLOOD PRESSURE: 60 MMHG | HEIGHT: 65 IN | HEART RATE: 105 BPM | WEIGHT: 150 LBS | TEMPERATURE: 99.4 F | OXYGEN SATURATION: 96 % | BODY MASS INDEX: 24.99 KG/M2

## 2017-12-15 DIAGNOSIS — R50.9 FEVER, UNSPECIFIED FEVER CAUSE: ICD-10-CM

## 2017-12-15 DIAGNOSIS — J02.9 PHARYNGITIS, UNSPECIFIED ETIOLOGY: ICD-10-CM

## 2017-12-15 DIAGNOSIS — J02.9 SORE THROAT: ICD-10-CM

## 2017-12-15 LAB
FLUAV+FLUBV AG SPEC QL IA: NEGATIVE
INT CON NEG: NEGATIVE
INT CON NEG: NEGATIVE
INT CON POS: POSITIVE
INT CON POS: POSITIVE
S PYO AG THROAT QL: NEGATIVE

## 2017-12-15 PROCEDURE — 87804 INFLUENZA ASSAY W/OPTIC: CPT | Performed by: NURSE PRACTITIONER

## 2017-12-15 PROCEDURE — 87880 STREP A ASSAY W/OPTIC: CPT | Performed by: NURSE PRACTITIONER

## 2017-12-15 PROCEDURE — 99214 OFFICE O/P EST MOD 30 MIN: CPT | Performed by: NURSE PRACTITIONER

## 2017-12-15 RX ORDER — AZITHROMYCIN 250 MG/1
TABLET, FILM COATED ORAL
Qty: 6 TAB | Refills: 0 | Status: SHIPPED | OUTPATIENT
Start: 2017-12-15 | End: 2018-03-08

## 2017-12-15 RX ORDER — IBUPROFEN 600 MG/1
600 TABLET ORAL EVERY 6 HOURS PRN
COMMUNITY
End: 2018-03-08

## 2017-12-15 ASSESSMENT — ENCOUNTER SYMPTOMS
SHORTNESS OF BREATH: 0
EYE DISCHARGE: 0
CHILLS: 1
SORE THROAT: 1
NAUSEA: 0
MYALGIAS: 1
SPUTUM PRODUCTION: 0
VOMITING: 0
EYE REDNESS: 0
ORTHOPNEA: 1
HEADACHES: 0
PALPITATIONS: 1
CONSTIPATION: 0
WHEEZING: 0
DIZZINESS: 0
ABDOMINAL PAIN: 0
DIARRHEA: 0
COUGH: 1
WEAKNESS: 0
FEVER: 1

## 2017-12-15 NOTE — LETTER
December 15, 2017       Patient: Mckenzie Cash   YOB: 1985   Date of Visit: 12/15/2017         To Whom It May Concern:    It is my medical opinion that Mckenzie Cash be excused from work absences due to illness. May return 12/18/16.    If you have any questions or concerns, please don't hesitate to call [phone]          Sincerely,          Amisha Dawson F.N.P.  Electronically Signed

## 2017-12-16 NOTE — PROGRESS NOTES
"Subjective:      Mckenzie Cash is a 32 y.o. female who presents with Sore Throat (Lightheaded, chills, cough x 2-3 days )            HPI  Mckenzie is here for severe sore throat, chills, body aches, cough- intermittent, phlegm yellow, no nasal congestion or ear pain, runny nose. P:ain with swallowing. Ibuprofen 600 mg, last dose 3 hrs ago. Daughter has had similar symptoms. Has been stressed and limited sleep. Admits to poor water intake. Had strep 2 months ago.    PMH:  has a past medical history of Generalized headaches; Other ovarian cyst, right side; Pelvic pain in female (10/02/2017); Plantar fasciitis; and Tendonitis.  MEDS:   Current Outpatient Prescriptions:   •  ibuprofen (MOTRIN) 600 MG Tab, Take 600 mg by mouth every 6 hours as needed., Disp: , Rfl:   •  azithromycin (ZITHROMAX) 250 MG Tab, Take 2 tabs by mouth on day 1, then take 1 tab on days 2-5, Disp: 6 Tab, Rfl: 0  •  amoxicillin (AMOXIL) 500 MG Cap, Take 1 Cap by mouth 2 times a day., Disp: 20 Cap, Rfl: 0  •  Acetaminophen (TYLENOL PO), Take 2 Tabs by mouth every 6 hours as needed., Disp: , Rfl:   ALLERGIES:   Allergies   Allergen Reactions   • Nkda [No Known Drug Allergy]    • Other Misc      \"Monocryl sutures\" part of incision came out and got drainage. Doctor said could be reason     SURGHX:   Past Surgical History:   Procedure Laterality Date   • PELVISCOPY N/A 10/4/2017    Procedure: PELVISCOPY;  Surgeon: Eliza Carrizales M.D.;  Location: SURGERY [facility];  Service: Gynecology   • CARPAL TUNNEL RELEASE Right 01/2017   • LAPAROSCOPY  7/22/2011    Performed by ROSS VELAZQUEZ at SURGERY [facility]     SOCHX:  reports that she has never smoked. She has never used smokeless tobacco. She reports that she does not drink alcohol or use drugs.  FH: Family history was reviewed, no pertinent findings to report    Review of Systems   Constitutional: Positive for chills, fever and malaise/fatigue.   HENT: Positive for " "congestion and sore throat. Negative for ear pain.    Eyes: Negative for discharge and redness.   Respiratory: Positive for cough. Negative for sputum production, shortness of breath and wheezing.    Cardiovascular: Positive for chest pain, palpitations and orthopnea.   Gastrointestinal: Negative for abdominal pain, constipation, diarrhea, nausea and vomiting.   Musculoskeletal: Positive for myalgias.   Neurological: Negative for dizziness, weakness and headaches.   All other systems reviewed and are negative.         Objective:     /60   Pulse (!) 105   Temp 37.4 °C (99.4 °F)   Ht 1.651 m (5' 5\")   Wt 68 kg (150 lb)   SpO2 96%   BMI 24.96 kg/m²      Physical Exam   Constitutional: She appears well-developed and well-nourished. No distress.   HENT:   Head: Normocephalic.   Eyes: Conjunctivae and EOM are normal. Pupils are equal, round, and reactive to light.   Neck: Normal range of motion. Neck supple.   Cardiovascular: Regular rhythm, normal heart sounds and normal pulses.  Tachycardia present.    Pulses:       Radial pulses are 2+ on the right side, and 2+ on the left side.   Pulmonary/Chest: Effort normal and breath sounds normal. No accessory muscle usage. No respiratory distress. She has no decreased breath sounds. She has no wheezes. She has no rhonchi. She has no rales.   Lymphadenopathy:     She has no cervical adenopathy.   Skin: She is not diaphoretic.   Vitals reviewed.              Assessment/Plan:     1. Fever, unspecified fever cause    - POCT Influenza A/B    2. Sore throat    - POCT Rapid Strep A: NEG  - POCT Influenza A/B: NEG    3. Pharyngitis, unspecified etiology    - azithromycin (ZITHROMAX) 250 MG Tab; Take 2 tabs by mouth on day 1, then take 1 tab on days 2-5  Dispense: 6 Tab; Refill: 0      Increase water intake  May use Ibuprofen/Tylenol prn for any fever, body aches or throat pain  May take long acting antihistamine for seasonal allergy symptoms prn  May use saline nasal " spray/jony pot for nasal congestion prn  May use Nasacort prn for nasal congestion  May use throat lozenges for throat discomfort  May gargle with salt water prn for throat discomfort  May drink smoothies for nutrition if too painful to swallow solid foods  Monitor for continued fever with treatment, any sinus pain/pressure with sinus congestion with thick mucus production and HA- need re-evaluation  Monitor for productive cough, SOB and chest pain/tightness, fever- need re-evaluation

## 2017-12-24 ENCOUNTER — OFFICE VISIT (OUTPATIENT)
Dept: URGENT CARE | Facility: CLINIC | Age: 32
End: 2017-12-24
Payer: COMMERCIAL

## 2017-12-24 VITALS
BODY MASS INDEX: 22.44 KG/M2 | TEMPERATURE: 98 F | OXYGEN SATURATION: 98 % | WEIGHT: 143 LBS | DIASTOLIC BLOOD PRESSURE: 80 MMHG | SYSTOLIC BLOOD PRESSURE: 108 MMHG | HEIGHT: 67 IN | RESPIRATION RATE: 20 BRPM | HEART RATE: 116 BPM

## 2017-12-24 DIAGNOSIS — J40 BRONCHITIS: ICD-10-CM

## 2017-12-24 PROCEDURE — 99214 OFFICE O/P EST MOD 30 MIN: CPT | Performed by: PHYSICIAN ASSISTANT

## 2017-12-24 RX ORDER — PROMETHAZINE HYDROCHLORIDE AND CODEINE PHOSPHATE 6.25; 1 MG/5ML; MG/5ML
5-10 SYRUP ORAL 3 TIMES DAILY PRN
Qty: 150 ML | Refills: 0 | Status: SHIPPED | OUTPATIENT
Start: 2017-12-24 | End: 2017-12-29

## 2017-12-24 RX ORDER — AMOXICILLIN AND CLAVULANATE POTASSIUM 875; 125 MG/1; MG/1
1 TABLET, FILM COATED ORAL 2 TIMES DAILY
Qty: 20 TAB | Refills: 0 | Status: SHIPPED | OUTPATIENT
Start: 2017-12-24 | End: 2018-01-03

## 2017-12-24 ASSESSMENT — ENCOUNTER SYMPTOMS
EYES NEGATIVE: 1
SHORTNESS OF BREATH: 0
COUGH: 1
CARDIOVASCULAR NEGATIVE: 1
CONSTITUTIONAL NEGATIVE: 1
FEVER: 0
SPUTUM PRODUCTION: 1
SORE THROAT: 0

## 2017-12-24 NOTE — PROGRESS NOTES
"Subjective:      Mckenzie Cash is a 32 y.o. female who presents with Cough (2x weeks had zpack did not work)            Cough   This is a new problem. The current episode started in the past 7 days. The problem has been unchanged. The problem occurs every few minutes. The cough is productive of sputum. Pertinent negatives include no fever, sore throat or shortness of breath. Nothing aggravates the symptoms. She has tried nothing for the symptoms. The treatment provided no relief. There is no history of asthma or environmental allergies.       Review of Systems   Constitutional: Negative.  Negative for fever.   HENT: Negative.  Negative for sore throat.    Eyes: Negative.    Respiratory: Positive for cough and sputum production. Negative for shortness of breath.    Cardiovascular: Negative.    Skin: Negative.    Endo/Heme/Allergies: Negative for environmental allergies.          Objective:     /80   Pulse (!) 116   Temp 36.7 °C (98 °F)   Resp 20   Ht 1.702 m (5' 7\")   Wt 64.9 kg (143 lb)   SpO2 98%   BMI 22.40 kg/m²      Physical Exam   Constitutional: She is oriented to person, place, and time. She appears well-developed and well-nourished. No distress.   HENT:   Head: Normocephalic and atraumatic.   Mouth/Throat: Oropharynx is clear and moist.   Eyes: EOM are normal. Pupils are equal, round, and reactive to light.   Neck: Normal range of motion. Neck supple.   Cardiovascular: Normal rate.    Pulmonary/Chest: Effort normal and breath sounds normal. No respiratory distress. She has no wheezes. She has no rales.   Lymphadenopathy:     She has no cervical adenopathy.   Neurological: She is alert and oriented to person, place, and time.   Skin: Skin is warm and dry.   Psychiatric: She has a normal mood and affect. Her behavior is normal. Judgment and thought content normal.   Nursing note and vitals reviewed.    Vitals:    12/24/17 1249   BP: 108/80   Pulse: (!) 116   Resp: 20   Temp: 36.7 °C (98 " "°F)   SpO2: 98%   Weight: 64.9 kg (143 lb)   Height: 1.702 m (5' 7\")     Active Ambulatory Problems     Diagnosis Date Noted   • Not immune to rubella 07/16/2012   • Complex cyst of right ovary 10/04/2017     Resolved Ambulatory Problems     Diagnosis Date Noted   • Normal labor and delivery 07/15/2012     Past Medical History:   Diagnosis Date   • Generalized headaches    • Other ovarian cyst, right side    • Pelvic pain in female 10/02/2017   • Plantar fasciitis    • Tendonitis      Current Outpatient Prescriptions on File Prior to Visit   Medication Sig Dispense Refill   • ibuprofen (MOTRIN) 600 MG Tab Take 600 mg by mouth every 6 hours as needed.     • azithromycin (ZITHROMAX) 250 MG Tab Take 2 tabs by mouth on day 1, then take 1 tab on days 2-5 6 Tab 0   • amoxicillin (AMOXIL) 500 MG Cap Take 1 Cap by mouth 2 times a day. 20 Cap 0   • Acetaminophen (TYLENOL PO) Take 2 Tabs by mouth every 6 hours as needed.       No current facility-administered medications on file prior to visit.      Gargles, Cepacol lozenges, Aleve/Advil as needed for throat pain  Family History   Problem Relation Age of Onset   • Adopted: Yes     Nkda [no known drug allergy] and Other misc              Assessment/Plan:     ·  bronchitis      · rx abx; otc prn  ·       "

## 2018-03-08 ENCOUNTER — OFFICE VISIT (OUTPATIENT)
Dept: URGENT CARE | Facility: PHYSICIAN GROUP | Age: 33
End: 2018-03-08
Payer: COMMERCIAL

## 2018-03-08 VITALS
WEIGHT: 149.6 LBS | SYSTOLIC BLOOD PRESSURE: 90 MMHG | OXYGEN SATURATION: 100 % | TEMPERATURE: 99.4 F | HEIGHT: 67 IN | HEART RATE: 98 BPM | BODY MASS INDEX: 23.48 KG/M2 | DIASTOLIC BLOOD PRESSURE: 60 MMHG

## 2018-03-08 DIAGNOSIS — R68.89 FLU-LIKE SYMPTOMS: ICD-10-CM

## 2018-03-08 DIAGNOSIS — R35.0 URINARY FREQUENCY: ICD-10-CM

## 2018-03-08 LAB
APPEARANCE UR: CLEAR
BILIRUB UR STRIP-MCNC: NORMAL MG/DL
COLOR UR AUTO: YELLOW
FLUAV+FLUBV AG SPEC QL IA: NEGATIVE
GLUCOSE UR STRIP.AUTO-MCNC: NORMAL MG/DL
INT CON NEG: NEGATIVE
INT CON POS: POSITIVE
KETONES UR STRIP.AUTO-MCNC: NORMAL MG/DL
LEUKOCYTE ESTERASE UR QL STRIP.AUTO: NORMAL
NITRITE UR QL STRIP.AUTO: NORMAL
PH UR STRIP.AUTO: 5 [PH] (ref 5–8)
POC URINE PREGNANCY TEST: NORMAL
PROT UR QL STRIP: NORMAL MG/DL
RBC UR QL AUTO: NORMAL
S PYO AG THROAT QL: NEGATIVE
SP GR UR STRIP.AUTO: 1.01
UROBILINOGEN UR STRIP-MCNC: NORMAL MG/DL

## 2018-03-08 PROCEDURE — 87880 STREP A ASSAY W/OPTIC: CPT | Performed by: PHYSICIAN ASSISTANT

## 2018-03-08 PROCEDURE — 81025 URINE PREGNANCY TEST: CPT | Performed by: PHYSICIAN ASSISTANT

## 2018-03-08 PROCEDURE — 99214 OFFICE O/P EST MOD 30 MIN: CPT | Performed by: PHYSICIAN ASSISTANT

## 2018-03-08 PROCEDURE — 87804 INFLUENZA ASSAY W/OPTIC: CPT | Performed by: PHYSICIAN ASSISTANT

## 2018-03-08 PROCEDURE — 81002 URINALYSIS NONAUTO W/O SCOPE: CPT | Performed by: PHYSICIAN ASSISTANT

## 2018-03-08 NOTE — LETTER
March 8, 2018         Patient: Mckenzie Cash   YOB: 1985   Date of Visit: 3/8/2018           To Whom it May Concern:    Mckenzie Cash was seen in my clinic on 3/8/2018. She is to be off through Saturday to recover.   If you have any questions or concerns, please don't hesitate to call.        Sincerely,           Nicol Akhtar P.A.-C.  Electronically Signed

## 2018-03-15 NOTE — PROGRESS NOTES
Chief Complaint   Patient presents with   • Headache     chills, sore throat, onset 12pm       HISTORY OF PRESENT ILLNESS: Patient is a 32 y.o. female who presents today for about 6 hour history onset of sore throat, headache, body aches and chills. Patient has had possible tactile fevers, has not checked.  No coughing at this point.  She states she feels suddenly very run down. Concerned for flu.  No nausea or vomiting. Does admit to having some urinary frequency when questioned and some lower back pain as well.  Not sure about UTI but wants to be checked for that too.  No attempted interventions thus far.     Patient Active Problem List    Diagnosis Date Noted   • Complex cyst of right ovary 10/04/2017   • Not immune to rubella 07/16/2012       Allergies:Nkda [no known drug allergy] and Other misc    Current Outpatient Prescriptions Ordered in [facility]   Medication Sig Dispense Refill   • amoxicillin (AMOXIL) 500 MG Cap Take 1 Cap by mouth 2 times a day. 20 Cap 0     No current Epic-ordered facility-administered medications on file.        Past Medical History:   Diagnosis Date   • Generalized headaches    • Other ovarian cyst, right side    • Pelvic pain in female 10/02/2017    2/10   • Plantar fasciitis    • Tendonitis        Social History   Substance Use Topics   • Smoking status: Never Smoker   • Smokeless tobacco: Never Used   • Alcohol use Yes      Comment: Rarely        No family status information on file.     Family History   Problem Relation Age of Onset   • Adopted: Yes       ROS:  Review of Systems   Constitutional: SEE HPI   HENT: SEE HPI    Eyes: Negative for blurred vision.   Respiratory: SEE HPI  Cardiovascular: Negative for chest pain, palpitations, orthopnea and leg swelling.   Gastrointestinal: Negative for heartburn, nausea, vomiting and abdominal pain.   Genitourinary:SEE HPI  All other systems reviewed and are negative.       Exam:  Blood pressure (!) 90/60, pulse 98, temperature 37.4 °C (99.4  "°F), height 1.702 m (5' 7\"), weight 67.9 kg (149 lb 9.6 oz), SpO2 100 %.  General:  Well nourished, well developed female in NAD, mildly unwell appearing.   Eyes: PERRLA, EOM within normal limits, no conjunctival injection, no scleral icterus, visual fields and acuity grossly intact.  Ears: Normal shape and symmetry, no tenderness, no discharge. External canals are without any significant edema or erythema. Tympanic membranes are without any inflammation, no effusion. Gross auditory acuity is intact  Nose: Symmetrical, sinuses without tenderness.  Bleeding, right nare after flu swab.  Controlled upon leaving clinic.   Mouth: reasonable hygiene, minimal diffuse erythema exudates or tonsillar enlargement.  Neck: no masses, range of motion within normal limits, no tracheal deviation. No lymphadenopathy  Pulmonary: Normal respiratory effort, no wheezes, crackles, or rhonchi.  Cardiovascular: regular rate and rhythm without murmurs, rubs, or gallops.  Abdomen: Soft, nontender, nondistended. Normal bowel sounds. No hepatosplenomegaly or masses, or hernias. No rebound or guarding.  Skin: No visible rashes or lesion. Warm, pink, dry.   Extremities: no clubbing, cyanosis, or edema.  Neuro: A&O x 3. Speech normal/clear.  Normal gait.     Component Results     Component Value Ref Range & Units Status   POC Color yellow  Negative Final   POC Appearance clear  Negative Final   POC Leukocyte Esterase neg  Negative Final   POC Nitrites neg  Negative Final   POC Urobiligen neg  Negative (0.2) mg/dL Final   POC Protein neg  Negative mg/dL Final   POC Urine PH 5.0  5.0 - 8.0 Final   POC Blood neg  Negative Final   POC Specific Gravity 1.015  <1.005 - >1.030 Final   POC Ketones neg  Negative mg/dL Final   POC Bilirubin neg  Negative mg/dL Final   POC Glucose neg  Negative mg/dL Final       Assessment/Plan:  1. Flu-like symptoms  POCT Rapid Strep A    POCT Influenza A/B    POCT Pregnancy    POCT Urinalysis   2. Urinary frequency   "         -strep, flu negative.  Patient with body aches, sore throat.  Benign throat exam, possibly early for positive strep or possible viral process.   -patient instructed to rest, fluids  -fluids emphasized. Alternating Tylenol/Motrin prn pain/inflammation/fever  -rest and supportive care emphasized  -please call me or send message in next few days if new or worsening symptom persist or become more typical of a strep/flu dx at that time.   -otherwise, recommend RTC if not improving or worsening at anytime.   -Urine also negative as above.   -work note provided    Supportive care, differential diagnoses, and indications for immediate follow-up discussed with patient.   Pathogenesis of diagnosis discussed including typical length and natural progression.   Instructed to return to clinic or nearest emergency department for any change in condition, further concerns, or worsening of symptoms.  Patient states understanding of the plan of care and discharge instructions.        Nicol Akhtar P.A.-C.

## 2018-05-12 ENCOUNTER — HOSPITAL ENCOUNTER (EMERGENCY)
Facility: MEDICAL CENTER | Age: 33
End: 2018-05-12
Attending: EMERGENCY MEDICINE
Payer: COMMERCIAL

## 2018-05-12 ENCOUNTER — APPOINTMENT (OUTPATIENT)
Dept: RADIOLOGY | Facility: MEDICAL CENTER | Age: 33
End: 2018-05-12
Attending: EMERGENCY MEDICINE
Payer: COMMERCIAL

## 2018-05-12 VITALS
OXYGEN SATURATION: 97 % | HEART RATE: 88 BPM | HEIGHT: 67 IN | WEIGHT: 149.91 LBS | TEMPERATURE: 99.1 F | SYSTOLIC BLOOD PRESSURE: 117 MMHG | RESPIRATION RATE: 18 BRPM | DIASTOLIC BLOOD PRESSURE: 85 MMHG | BODY MASS INDEX: 23.53 KG/M2

## 2018-05-12 DIAGNOSIS — S50.01XA CONTUSION OF RIGHT ELBOW, INITIAL ENCOUNTER: ICD-10-CM

## 2018-05-12 PROCEDURE — 99284 EMERGENCY DEPT VISIT MOD MDM: CPT

## 2018-05-12 PROCEDURE — A9270 NON-COVERED ITEM OR SERVICE: HCPCS | Performed by: EMERGENCY MEDICINE

## 2018-05-12 PROCEDURE — 73080 X-RAY EXAM OF ELBOW: CPT | Mod: RT

## 2018-05-12 PROCEDURE — 700102 HCHG RX REV CODE 250 W/ 637 OVERRIDE(OP): Performed by: EMERGENCY MEDICINE

## 2018-05-12 RX ORDER — HYDROCODONE BITARTRATE AND ACETAMINOPHEN 5; 325 MG/1; MG/1
1-2 TABLET ORAL EVERY 6 HOURS PRN
Qty: 15 TAB | Refills: 0 | Status: SHIPPED | OUTPATIENT
Start: 2018-05-12 | End: 2018-05-15

## 2018-05-12 RX ORDER — HYDROCODONE BITARTRATE AND ACETAMINOPHEN 5; 325 MG/1; MG/1
1 TABLET ORAL ONCE
Status: COMPLETED | OUTPATIENT
Start: 2018-05-12 | End: 2018-05-12

## 2018-05-12 RX ADMIN — HYDROCODONE BITARTRATE AND ACETAMINOPHEN 1 TABLET: 5; 325 TABLET ORAL at 19:00

## 2018-05-12 ASSESSMENT — PAIN SCALES - GENERAL: PAINLEVEL_OUTOF10: 8

## 2018-05-13 NOTE — DISCHARGE INSTRUCTIONS
Elbow Injury  Minor fractures, sprains, and bruises of the elbow will all cause swelling and pain. X-rays often show swelling around the joint but may not show a fracture line on x-rays taken right after the injury. The treatment for all these types of injuries is to reduce swelling and pain and to rest the joint until movement is painless. Repeat exam and x-rays several weeks after an elbow injury may show a minor fracture not seen on the initial exam.  Most of the time a sling is needed for the first days or weeks after the injury. Apply ice packs to the elbow for 20-30 minutes every 2 hours for the next few days. Keep your elbow elevated above the level of your heart as much as possible until the pain and swelling are better. An elastic wrap or splint may also be used to reduce movement in addition to a sling. Call your caregiver for follow-up care within one week. Keeping the elbow immobilized for too long can hurt recovery.   SEEK MEDICAL CARE IF:   · Your pain increases, or if you develop a numb, cold, or pale forearm or hand.   · You are not improving.   · You have any other questions or concerns regarding your injury.   Document Released: 01/25/2006 Document Revised: 03/11/2013 Document Reviewed: 01/06/2010  [employer]® Patient Information ©2013 [employer].

## 2018-05-13 NOTE — ED NOTES
Sling applied. Discharge information reviewed in detail. Pt verbalized understanding of discharge instructions to follow up with Ortho as needed, PCP and to return to ER if condition worsens. Pt educated/expressed awareness of not driving or operating heavy machinery.   Patient educated no combining of alcohol with other sedating medications. Patient ambulated out of ED with significant other

## 2018-05-13 NOTE — ED PROVIDER NOTES
ED Provider Note    CHIEF COMPLAINT  Chief Complaint   Patient presents with   • Arm Injury        HPI  Mckenzie Cash is a 32 y.o. female who presents to the ED complaining of right elbow pain. The patient was at home she suddenly got kicked in the right elbow by her horse. The patient's crits pain. Denies any paresthesias or any other symptoms. The patient was sent for evaluation.    REVIEW OF SYSTEMS  See HPI for further details. All other systems are negative.     PAST MEDICAL HISTORY  Past Medical History:   Diagnosis Date   • Generalized headaches    • Other ovarian cyst, right side    • Pelvic pain in female 10/02/2017    2/10   • Plantar fasciitis    • Tendonitis        FAMILY HISTORY  Family History   Problem Relation Age of Onset   • Adopted: Yes     Patient's family history has been discussed and is been found to be noncontributory to his present illness  SOCIAL HISTORY  Social History     Social History   • Marital status: Single     Spouse name: N/A   • Number of children: N/A   • Years of education: N/A     Social History Main Topics   • Smoking status: Never Smoker   • Smokeless tobacco: Never Used   • Alcohol use Yes      Comment: Rarely    • Drug use: No   • Sexual activity: Not on file     Other Topics Concern   • Not on file     Social History Narrative    ** Merged History Encounter **           Rogerio Monaco M.D.The patient denies any significant tobacco, alcohol or drug use        SURGICAL HISTORY  Past Surgical History:   Procedure Laterality Date   • PELVISCOPY N/A 10/4/2017    Procedure: PELVISCOPY;  Surgeon: Eliza Carrizales M.D.;  Location: SURGERY [facility];  Service: Gynecology   • CARPAL TUNNEL RELEASE Right 01/2017   • LAPAROSCOPY  7/22/2011    Performed by ROSS VELAZQUEZ at SURGERY [facility]       CURRENT MEDICATIONS   Home Medications    **Home medications have not yet been reviewed for this encounter**         ALLERGIES   Allergies   Allergen  "Reactions   • Nkda [No Known Drug Allergy]    • Other Misc      \"Monocryl sutures\" part of incision came out and got drainage. Doctor said could be reason       PHYSICAL EXAM  VITAL SIGNS: /90   Pulse 85   Temp 37.2 °C (99 °F)   Resp 18   Ht 1.702 m (5' 7\")   Wt 68 kg (149 lb 14.6 oz)   SpO2 99%   BMI 23.48 kg/m²    Pulse Ox interpretation. Nonhypoxic    Constitutional: Well developed, Well nourished, No acute distress, Non-toxic appearance.   Cardiovascular: Regular rate and rhythm without murmurs gallops or rubs.   Thorax & Lungs: Lungs are clear to auscultation bilaterally, there are no wheezes no rales. Chest wall is nontender.  Neck and back are nontender  Abdomen: Soft, nontender nondistended. Bowel sounds are present.   Skin: Warm, Dry, No erythema,   Musculoskeletal: Tender about the right elbow. There is some ecchymosis to the lateral aspect of the elbow. She does have limited range of motion of the elbow due to pain. The wrist, forearm, humerus, shoulder are nontender. The rest the musculoskeletal exam is normal.  Neurologic: Alert & oriented x 3, Normal motor function, Normal sensory function, No focal deficits noted.     RADIOLOGY/PROCEDURES  DX-ELBOW-COMPLETE 3+ RIGHT   Final Result      1.  Unremarkable right elbow series.            COURSE & MEDICAL DECISION MAKING  Pertinent Labs & Imaging studies reviewed. (See chart for details)  . No signs of fracture on x-ray. This point, I do feel so good. Contusion to that lateral epicondylar muscle belly. This point, recommend range of motion exercises, ice for the next 48 hours and ice and heat. I will give the patient a prescription of narcotic pain medications as needed. The patient has been consented for this. She is to follow-up with Dr. Soto, who is on for orthopedic surgery if she has any continued symptoms greater than one week. Return as needed    FINAL IMPRESSION  1. Contusion of right elbow, initial encounter       The patient will " return for new or worsening symptoms and is stable at the time of discharge.    The patient is referred to a primary physician for blood pressure management, diabetic screening, and for all other preventative health concerns.    I reviewed prescription monitoring program for patient's narcotic use before prescribing a scheduled drug.The patient will not drink alcohol nor drive with prescribed medications      In prescribing controlled substances to this patient, I certify that I have obtained and reviewed the medical history this patient I have also made a good zoe effort to obtain applicable records from other providers who have treated the patient and records did not demonstrate any increased risk of substance abuse that would prevent me from prescribing controlled substances.     I have conducted a physical exam and documented it. I have reviewed Ms. Cash’s prescription history as maintained by the [place] Prescription Monitoring Program.     I have assessed the patient’s risk for abuse, dependency, and addiction using the validated Opioid Risk Tool available at https://www.mdcalc.com/epknpp-ejlu-dvfk-ort-narcotic-abuse.     Given the above, I believe the benefits of controlled substance therapy outweigh the risks. The reasons for prescribing controlled substances include in my professional opinion, controlled substances are a reasonable choice for this patient. Accordingly, I have discussed the risk and benefits, treatment plan, and alternative therapies with the patient. The patient has been consented for the medication and understands the risks.        DISPOSITION:  Patient will be discharged home in stable condition.    FOLLOW UP:  Foster Soto M.D.  [address] [facility]  [place] [address]  [phone]    Schedule an appointment as soon as possible for a visit in 1 week  As needed, Return if any symptoms worsen      OUTPATIENT MEDICATIONS:  New Prescriptions    HYDROCODONE-ACETAMINOPHEN (NORCO) 5-325 MG  TAB PER TABLET    Take 1-2 Tabs by mouth every 6 hours as needed for up to 3 days.             Electronically signed by: Sonny Nguyen, 5/12/2018 6:57 PM

## 2018-06-26 ENCOUNTER — HOSPITAL ENCOUNTER (OUTPATIENT)
Dept: LAB | Facility: MEDICAL CENTER | Age: 33
End: 2018-06-26
Attending: INTERNAL MEDICINE
Payer: COMMERCIAL

## 2018-06-26 LAB
ALBUMIN SERPL BCP-MCNC: 4.2 G/DL (ref 3.2–4.9)
ALBUMIN/GLOB SERPL: 1.4 G/DL
ALP SERPL-CCNC: 74 U/L (ref 30–99)
ALT SERPL-CCNC: 10 U/L (ref 2–50)
ANION GAP SERPL CALC-SCNC: 9 MMOL/L (ref 0–11.9)
AST SERPL-CCNC: 15 U/L (ref 12–45)
BASOPHILS # BLD AUTO: 0.4 % (ref 0–1.8)
BASOPHILS # BLD: 0.02 K/UL (ref 0–0.12)
BILIRUB SERPL-MCNC: 0.6 MG/DL (ref 0.1–1.5)
BUN SERPL-MCNC: 13 MG/DL (ref 8–22)
CALCIUM SERPL-MCNC: 8.8 MG/DL (ref 8.5–10.5)
CHLORIDE SERPL-SCNC: 108 MMOL/L (ref 96–112)
CO2 SERPL-SCNC: 25 MMOL/L (ref 20–33)
CREAT SERPL-MCNC: 0.88 MG/DL (ref 0.5–1.4)
EOSINOPHIL # BLD AUTO: 0.04 K/UL (ref 0–0.51)
EOSINOPHIL NFR BLD: 0.7 % (ref 0–6.9)
ERYTHROCYTE [DISTWIDTH] IN BLOOD BY AUTOMATED COUNT: 42.5 FL (ref 35.9–50)
GLOBULIN SER CALC-MCNC: 3.1 G/DL (ref 1.9–3.5)
GLUCOSE SERPL-MCNC: 70 MG/DL (ref 65–99)
HCT VFR BLD AUTO: 42.2 % (ref 37–47)
HGB BLD-MCNC: 13.7 G/DL (ref 12–16)
IMM GRANULOCYTES # BLD AUTO: 0.01 K/UL (ref 0–0.11)
IMM GRANULOCYTES NFR BLD AUTO: 0.2 % (ref 0–0.9)
LYMPHOCYTES # BLD AUTO: 1.65 K/UL (ref 1–4.8)
LYMPHOCYTES NFR BLD: 28.9 % (ref 22–41)
MCH RBC QN AUTO: 29.8 PG (ref 27–33)
MCHC RBC AUTO-ENTMCNC: 32.5 G/DL (ref 33.6–35)
MCV RBC AUTO: 91.7 FL (ref 81.4–97.8)
MONOCYTES # BLD AUTO: 0.2 K/UL (ref 0–0.85)
MONOCYTES NFR BLD AUTO: 3.5 % (ref 0–13.4)
NEUTROPHILS # BLD AUTO: 3.79 K/UL (ref 2–7.15)
NEUTROPHILS NFR BLD: 66.3 % (ref 44–72)
NRBC # BLD AUTO: 0 K/UL
NRBC BLD-RTO: 0 /100 WBC
PLATELET # BLD AUTO: 209 K/UL (ref 164–446)
PMV BLD AUTO: 11.2 FL (ref 9–12.9)
POTASSIUM SERPL-SCNC: 3.7 MMOL/L (ref 3.6–5.5)
PROT SERPL-MCNC: 7.3 G/DL (ref 6–8.2)
RBC # BLD AUTO: 4.6 M/UL (ref 4.2–5.4)
SODIUM SERPL-SCNC: 142 MMOL/L (ref 135–145)
WBC # BLD AUTO: 5.7 K/UL (ref 4.8–10.8)

## 2018-06-26 PROCEDURE — 80053 COMPREHEN METABOLIC PANEL: CPT

## 2018-06-26 PROCEDURE — 36415 COLL VENOUS BLD VENIPUNCTURE: CPT

## 2018-06-26 PROCEDURE — 85025 COMPLETE CBC W/AUTO DIFF WBC: CPT

## 2018-06-26 PROCEDURE — 82784 ASSAY IGA/IGD/IGG/IGM EACH: CPT

## 2018-06-26 PROCEDURE — 86162 COMPLEMENT TOTAL (CH50): CPT

## 2018-06-26 PROCEDURE — 82785 ASSAY OF IGE: CPT

## 2018-06-26 PROCEDURE — 86317 IMMUNOASSAY INFECTIOUS AGENT: CPT

## 2018-06-28 LAB
C DIPHTHERIAE IGG SER-ACNC: 0 IU/ML
CH50 SERPL-ACNC: 100 CAE UNITS (ref 60–144)
IGA SERPL-MCNC: 114 MG/DL (ref 68–408)
IGE SERPL-ACNC: 41 KU/L
IGG SERPL-MCNC: 1120 MG/DL (ref 768–1632)
IGM SERPL-MCNC: 118 MG/DL (ref 35–263)

## 2018-06-29 LAB
S PN DA SERO 19F IGG SER-MCNC: 1.03 UG/ML
S PNEUM DA 1 IGG SER-MCNC: 0.29 UG/ML
S PNEUM DA 12F IGG SER-MCNC: 0.06 UG/ML
S PNEUM DA 14 IGG SER-MCNC: 1.16 UG/ML
S PNEUM DA 18C IGG SER-MCNC: 0.36 UG/ML
S PNEUM DA 23F IGG SER-MCNC: 0.21 UG/ML
S PNEUM DA 3 IGG SER-MCNC: 0.28 UG/ML
S PNEUM DA 4 IGG SER-MCNC: 0.31 UG/ML
S PNEUM DA 5 IGG SER-MCNC: 4.23 UG/ML
S PNEUM DA 6B IGG SER-MCNC: 0.47 UG/ML
S PNEUM DA 7F IGG SER-MCNC: 0.37 UG/ML
S PNEUM DA 8 IGG SER-MCNC: 0.72 UG/ML
S PNEUM DA 9N IGG SER-MCNC: 0.42 UG/ML
S PNEUM DA 9V IGG SER-MCNC: 0.71 UG/ML
S PNEUM SEROTYPE IGG SER-IMP: NORMAL

## 2018-11-10 ENCOUNTER — OFFICE VISIT (OUTPATIENT)
Dept: URGENT CARE | Facility: CLINIC | Age: 33
End: 2018-11-10
Payer: COMMERCIAL

## 2018-11-10 VITALS
BODY MASS INDEX: 24.12 KG/M2 | OXYGEN SATURATION: 97 % | DIASTOLIC BLOOD PRESSURE: 70 MMHG | RESPIRATION RATE: 20 BRPM | SYSTOLIC BLOOD PRESSURE: 124 MMHG | WEIGHT: 154 LBS | TEMPERATURE: 98.5 F | HEART RATE: 90 BPM

## 2018-11-10 DIAGNOSIS — J02.9 SORE THROAT: ICD-10-CM

## 2018-11-10 DIAGNOSIS — J98.01 BRONCHOSPASM: ICD-10-CM

## 2018-11-10 DIAGNOSIS — J01.90 ACUTE RHINOSINUSITIS: ICD-10-CM

## 2018-11-10 LAB
INT CON NEG: NEGATIVE
INT CON POS: POSITIVE
S PYO AG THROAT QL: NEGATIVE

## 2018-11-10 PROCEDURE — 99203 OFFICE O/P NEW LOW 30 MIN: CPT | Performed by: EMERGENCY MEDICINE

## 2018-11-10 PROCEDURE — 87880 STREP A ASSAY W/OPTIC: CPT | Performed by: EMERGENCY MEDICINE

## 2018-11-10 RX ORDER — AMOXICILLIN AND CLAVULANATE POTASSIUM 875; 125 MG/1; MG/1
1 TABLET, FILM COATED ORAL 2 TIMES DAILY
Qty: 14 TAB | Refills: 0 | Status: SHIPPED | OUTPATIENT
Start: 2018-11-10 | End: 2018-11-17

## 2018-11-10 ASSESSMENT — ENCOUNTER SYMPTOMS
WHEEZING: 1
HEMOPTYSIS: 0
SPUTUM PRODUCTION: 0
SORE THROAT: 1
SINUS PAIN: 1
CHILLS: 0
SHORTNESS OF BREATH: 0
RHINORRHEA: 1
HEADACHES: 0
COUGH: 1
MYALGIAS: 0
VOMITING: 0
NAUSEA: 0
ABDOMINAL PAIN: 0
DIARRHEA: 0

## 2018-11-11 NOTE — PATIENT INSTRUCTIONS
Use behind-the-counter pseudoephedrine (Sudafed) oral decongestant as needed; avoid bedtime use. Use over-the-counter oxymetazoline (Afrin) nasal spray as needed for up to 3 days only; follow package instructions for dosing.  Use an oral probiotic daily, such as Culturelle, Align, or yogurt to reduce gastrointestinal symptoms.  Sinusitis, Adult  Sinusitis is soreness and inflammation of your sinuses. Sinuses are hollow spaces in the bones around your face. Your sinuses are located:  · Around your eyes.  · In the middle of your forehead.  · Behind your nose.  · In your cheekbones.  Your sinuses and nasal passages are lined with a stringy fluid (mucus). Mucus normally drains out of your sinuses. When your nasal tissues become inflamed or swollen, the mucus can become trapped or blocked so air cannot flow through your sinuses. This allows bacteria, viruses, and funguses to grow, which leads to infection.  Sinusitis can develop quickly and last for 7?10 days (acute) or for more than 12 weeks (chronic). Sinusitis often develops after a cold.  What are the causes?  This condition is caused by anything that creates swelling in the sinuses or stops mucus from draining, including:  · Allergies.  · Asthma.  · Bacterial or viral infection.  · Abnormally shaped bones between the nasal passages.  · Nasal growths that contain mucus (nasal polyps).  · Narrow sinus openings.  · Pollutants, such as chemicals or irritants in the air.  · A foreign object stuck in the nose.  · A fungal infection. This is rare.  What increases the risk?  The following factors may make you more likely to develop this condition:  · Having allergies or asthma.  · Having had a recent cold or respiratory tract infection.  · Having structural deformities or blockages in your nose or sinuses.  · Having a weak immune system.  · Doing a lot of swimming or diving.  · Overusing nasal sprays.  · Smoking.  What are the signs or symptoms?  The main symptoms of this  condition are pain and a feeling of pressure around the affected sinuses. Other symptoms include:  · Upper toothache.  · Earache.  · Headache.  · Bad breath.  · Decreased sense of smell and taste.  · A cough that may get worse at night.  · Fatigue.  · Fever.  · Thick drainage from your nose. The drainage is often green and it may contain pus (purulent).  · Stuffy nose or congestion.  · Postnasal drip. This is when extra mucus collects in the throat or back of the nose.  · Swelling and warmth over the affected sinuses.  · Sore throat.  · Sensitivity to light.  How is this diagnosed?  This condition is diagnosed based on symptoms, a medical history, and a physical exam. To find out if your condition is acute or chronic, your health care provider may:  · Look in your nose for signs of nasal polyps.  · Tap over the affected sinus to check for signs of infection.  · View the inside of your sinuses using an imaging device that has a light attached (endoscope).  If your health care provider suspects that you have chronic sinusitis, you may also:  · Be tested for allergies.  · Have a sample of mucus taken from your nose (nasal culture) and checked for bacteria.  · Have a mucus sample examined to see if your sinusitis is related to an allergy.  If your sinusitis does not respond to treatment and it lasts longer than 8 weeks, you may have an MRI or CT scan to check your sinuses. These scans also help to determine how severe your infection is.  In rare cases, a bone biopsy may be done to rule out more serious types of fungal sinus disease.  How is this treated?  Treatment for sinusitis depends on the cause and whether your condition is chronic or acute. If a virus is causing your sinusitis, your symptoms will go away on their own within 10 days. You may be given medicines to relieve your symptoms, including:  · Topical nasal decongestants. They shrink swollen nasal passages and let mucus drain from your  sinuses.  · Antihistamines. These drugs block inflammation that is triggered by allergies. This can help to ease swelling in your nose and sinuses.  · Topical nasal corticosteroids. These are nasal sprays that ease inflammation and swelling in your nose and sinuses.  · Nasal saline washes. These rinses can help to get rid of thick mucus in your nose.  If your condition is caused by bacteria, you will be given an antibiotic medicine. If your condition is caused by a fungus, you will be given an antifungal medicine.  Surgery may be needed to correct underlying conditions, such as narrow nasal passages. Surgery may also be needed to remove polyps.  Follow these instructions at home:  Medicines  · Take, use, or apply over-the-counter and prescription medicines only as told by your health care provider. These may include nasal sprays.  · If you were prescribed an antibiotic medicine, take it as told by your health care provider. Do not stop taking the antibiotic even if you start to feel better.  Hydrate and Humidify  · Drink enough water to keep your urine clear or pale yellow. Staying hydrated will help to thin your mucus.  · Use a cool mist humidifier to keep the humidity level in your home above 50%.  · Inhale steam for 10-15 minutes, 3-4 times a day or as told by your health care provider. You can do this in the bathroom while a hot shower is running.  · Limit your exposure to cool or dry air.  Rest  · Rest as much as possible.  · Sleep with your head raised (elevated).  · Make sure to get enough sleep each night.  General instructions  · Apply a warm, moist washcloth to your face 3-4 times a day or as told by your health care provider. This will help with discomfort.  · Wash your hands often with soap and water to reduce your exposure to viruses and other germs. If soap and water are not available, use hand .  · Do not smoke. Avoid being around people who are smoking (secondhand smoke).  · Keep all  follow-up visits as told by your health care provider. This is important.  Contact a health care provider if:  · You have a fever.  · Your symptoms get worse.  · Your symptoms do not improve within 10 days.  Get help right away if:  · You have a severe headache.  · You have persistent vomiting.  · You have pain or swelling around your face or eyes.  · You have vision problems.  · You develop confusion.  · Your neck is stiff.  · You have trouble breathing.  This information is not intended to replace advice given to you by your health care provider. Make sure you discuss any questions you have with your health care provider.  Document Released: 12/18/2006 Document Revised: 08/13/2017 Document Reviewed: 10/12/2016  [employer] Interactive Patient Education © 2017 [employer]  Bronchospasm, Adult  A bronchospasm is when the tubes that carry air in and out of your lungs (airways) spasm or tighten. During a bronchospasm it is hard to breathe. This is because the airways get smaller. A bronchospasm can be triggered by:  · Allergies. These may be to animals, pollen, food, or mold.  · Infection. This is a common cause of bronchospasm.  · Exercise.  · Irritants. These include pollution, cigarette smoke, strong odors, aerosol sprays, and paint fumes.  · Weather changes.  · Stress.  · Being emotional.  Follow these instructions at home:  · Always have a plan for getting help. Know when to call your doctor and local emergency services (911 in the [place] Know where you can get emergency care.  · Only take medicines as told by your doctor.  · If you were prescribed an inhaler or nebulizer machine, ask your doctor how to use it correctly. Always use a spacer with your inhaler if you were given one.  · Stay calm during an attack. Try to relax and breathe more slowly.  · Control your home environment:  ¨ Change your heating and air conditioning filter at least once a month.  ¨ Limit your use of fireplaces and wood stoves.  ¨ Do not   smoke. Do not  allow smoking in your home.  ¨ Avoid perfumes and fragrances.  ¨ Get rid of pests (such as roaches and mice) and their droppings.  ¨ Throw away plants if you see mold on them.  ¨ Keep your house clean and dust free.  ¨ Replace carpet with wood, tile, or vinyl joseph. Carpet can trap dander and dust.  ¨ Use allergy-proof pillows, mattress covers, and box spring covers.  ¨ Wash bed sheets and blankets every week in hot water. Dry them in a dryer.  ¨ Use blankets that are made of polyester or cotton.  ¨ Wash hands frequently.  Contact a doctor if:  · You have muscle aches.  · You have chest pain.  · The thick spit you spit or cough up (sputum) changes from clear or white to yellow, green, gray, or bloody.  · The thick spit you spit or cough up gets thicker.  · There are problems that may be related to the medicine you are given such as:  ¨ A rash.  ¨ Itching.  ¨ Swelling.  ¨ Trouble breathing.  Get help right away if:  · You feel you cannot breathe or catch your breath.  · You cannot stop coughing.  · Your treatment is not helping you breathe better.  · You have very bad chest pain.  This information is not intended to replace advice given to you by your health care provider. Make sure you discuss any questions you have with your health care provider.  Document Released: 10/15/2010 Document Revised: 05/25/2017 Document Reviewed: 06/10/2014  [employer] Interactive Patient Education © 2017 [employer]

## 2018-11-11 NOTE — PROGRESS NOTES
"Subjective:      Mckenzie Cash is a 33 y.o. female who presents with Pharyngitis (congestion cough for 9 days )            URI    This is a new problem. Episode onset: 9 days. The problem has been unchanged. Maximum temperature: initially, resolved. Associated symptoms include congestion, coughing, rhinorrhea, sinus pain, a sore throat and wheezing. Pertinent negatives include no abdominal pain, chest pain, diarrhea, ear pain, headaches, nausea, plugged ear sensation, rash, sneezing or vomiting. She has tried decongestant for the symptoms. The treatment provided mild relief.       Review of Systems   Constitutional: Negative for chills and malaise/fatigue.   HENT: Positive for congestion, rhinorrhea, sinus pain and sore throat. Negative for ear pain, hearing loss, nosebleeds and sneezing.    Respiratory: Positive for cough and wheezing. Negative for hemoptysis, sputum production and shortness of breath.    Cardiovascular: Negative for chest pain.   Gastrointestinal: Negative for abdominal pain, diarrhea, nausea and vomiting.   Musculoskeletal: Negative for myalgias.   Skin: Negative for rash.   Neurological: Negative for headaches.   Endo/Heme/Allergies: Positive for environmental allergies.     PMH:  has a past medical history of Generalized headaches; Other ovarian cyst, right side; Pelvic pain in female (10/02/2017); Plantar fasciitis; and Tendonitis.  MEDS:   Current Outpatient Prescriptions:   •  amoxicillin-clavulanate (AUGMENTIN) 875-125 MG Tab, Take 1 Tab by mouth 2 times a day for 7 days., Disp: 14 Tab, Rfl: 0  •  Albuterol Sulfate 108 (90 Base) MCG/ACT AEROSOL POWDER, BREATH ACTIVATED, Inhale 1-2 Puffs by mouth every 6 hours as needed (coughing, wheezing)., Disp: 1 Each, Rfl: 0  •  amoxicillin (AMOXIL) 500 MG Cap, Take 1 Cap by mouth 2 times a day., Disp: 20 Cap, Rfl: 0  ALLERGIES:   Allergies   Allergen Reactions   • Nkda [No Known Drug Allergy]    • Other Misc      \"Monocryl sutures\" part of " incision came out and got drainage. Doctor said could be reason     SURGHX:   Past Surgical History:   Procedure Laterality Date   • PELVISCOPY N/A 10/4/2017    Procedure: PELVISCOPY;  Surgeon: Eliza Carrizales M.D.;  Location: SURGERY [facility];  Service: Gynecology   • CARPAL TUNNEL RELEASE Right 01/2017   • LAPAROSCOPY  7/22/2011    Performed by ROSS VELAZQUEZ at SURGERY [facility]     SOCHX:  reports that she has never smoked. She has never used smokeless tobacco. She reports that she drinks alcohol. She reports that she does not use drugs.  FH: family history is not on file. She was adopted.       Objective:     /70 (BP Location: Left arm, Patient Position: Sitting, BP Cuff Size: Adult)   Pulse 90   Temp 36.9 °C (98.5 °F) (Temporal)   Resp 20   Wt 69.9 kg (154 lb)   SpO2 97%   BMI 24.12 kg/m²      Physical Exam   Constitutional: She appears well-developed and well-nourished. She is cooperative. She does not appear ill. No distress.   HENT:   Right Ear: Tympanic membrane and ear canal normal.   Left Ear: Tympanic membrane and ear canal normal.   Nose: Mucosal edema and rhinorrhea present. Right sinus exhibits maxillary sinus tenderness. Left sinus exhibits maxillary sinus tenderness.   Mouth/Throat: Uvula is midline and mucous membranes are normal. No trismus in the jaw. No uvula swelling. Posterior oropharyngeal erythema present. No tonsillar abscesses.   Eyes: Conjunctivae are normal.   Neck: Trachea normal. Neck supple.   Cardiovascular: Normal rate, regular rhythm and normal heart sounds.    Pulmonary/Chest: No accessory muscle usage. No tachypnea. No respiratory distress. She has no decreased breath sounds. She has wheezes in the right upper field and the left upper field. She has no rhonchi. She has no rales.   Lymphadenopathy:     She has cervical adenopathy.        Right cervical: Superficial cervical adenopathy present.        Left cervical: Superficial cervical  adenopathy present.   Neurological: She is alert.   Skin: Skin is warm and dry.   Psychiatric: She has a normal mood and affect.               Assessment/Plan:     1. Acute rhinosinusitis  Recommended supportive care measures, including rest, increasing oral fluid intake and use of over-the-counter medications for relief of symptoms.  Based on duration:  - amoxicillin-clavulanate (AUGMENTIN) 875-125 MG Tab; Take 1 Tab by mouth 2 times a day for 7 days.  Dispense: 14 Tab; Refill: 0    2. Bronchospasm  Continue Singulair, Xyzol  - Albuterol Sulfate 108 (90 Base) MCG/ACT AEROSOL POWDER, BREATH ACTIVATED; Inhale 1-2 Puffs by mouth every 6 hours as needed (coughing, wheezing).  Dispense: 1 Each; Refill: 0    3. Sore throat  negative- POCT Rapid Strep A

## 2019-03-23 ENCOUNTER — OFFICE VISIT (OUTPATIENT)
Dept: URGENT CARE | Facility: CLINIC | Age: 34
End: 2019-03-23
Payer: COMMERCIAL

## 2019-03-23 VITALS
SYSTOLIC BLOOD PRESSURE: 118 MMHG | WEIGHT: 154 LBS | BODY MASS INDEX: 24.12 KG/M2 | HEART RATE: 86 BPM | RESPIRATION RATE: 16 BRPM | OXYGEN SATURATION: 97 % | TEMPERATURE: 99.6 F | DIASTOLIC BLOOD PRESSURE: 74 MMHG

## 2019-03-23 DIAGNOSIS — J04.0 LARYNGITIS: ICD-10-CM

## 2019-03-23 PROCEDURE — 99214 OFFICE O/P EST MOD 30 MIN: CPT | Performed by: PHYSICIAN ASSISTANT

## 2019-03-23 RX ORDER — AMOXICILLIN AND CLAVULANATE POTASSIUM 875; 125 MG/1; MG/1
1 TABLET, FILM COATED ORAL 2 TIMES DAILY
Qty: 14 TAB | Refills: 0 | Status: SHIPPED | OUTPATIENT
Start: 2019-03-23 | End: 2019-03-30

## 2019-03-23 RX ORDER — BENZONATATE 100 MG/1
100 CAPSULE ORAL 3 TIMES DAILY PRN
Qty: 30 CAP | Refills: 0 | Status: SHIPPED | OUTPATIENT
Start: 2019-03-23 | End: 2019-05-24

## 2019-03-23 RX ORDER — PREDNISONE 20 MG/1
TABLET ORAL
Qty: 8 TAB | Refills: 0 | Status: SHIPPED | OUTPATIENT
Start: 2019-03-23 | End: 2019-05-24

## 2019-03-29 ASSESSMENT — ENCOUNTER SYMPTOMS
ABDOMINAL PAIN: 0
SINUS PAIN: 0
SPUTUM PRODUCTION: 0
PALPITATIONS: 0
EYE PAIN: 0
BLURRED VISION: 0
DIZZINESS: 0
NAUSEA: 0
CHILLS: 0
VOMITING: 0
COUGH: 1
SHORTNESS OF BREATH: 0
HEADACHES: 1
WHEEZING: 0
MYALGIAS: 0
FEVER: 1
SORE THROAT: 1
SWOLLEN GLANDS: 1
DIARRHEA: 0
TROUBLE SWALLOWING: 0
HOARSE VOICE: 1

## 2019-03-29 NOTE — PROGRESS NOTES
Subjective:      Mckenzie Cash is a 33 y.o. female who presents with Pharyngitis (no voice, x 2 weeks, coughing, congestion)      Pharyngitis    This is a new problem. The current episode started 1 to 4 weeks ago (Started 2 weeks ago). The problem has been gradually worsening. Neither side of throat is experiencing more pain than the other. Maximum temperature: Subjective fever. The fever has been present for 1 to 2 days. The pain is moderate. Associated symptoms include congestion, coughing (Intermittent, dry), ear pain, headaches, a hoarse voice, a plugged ear sensation and swollen glands. Pertinent negatives include no abdominal pain, diarrhea, shortness of breath, trouble swallowing or vomiting. She has had no exposure to strep or mono. She has tried NSAIDs (OTC cold/flu medications) for the symptoms. The treatment provided mild relief.       Review of Systems   Constitutional: Positive for fever and malaise/fatigue. Negative for chills.   HENT: Positive for congestion, ear pain, hoarse voice and sore throat. Negative for sinus pain and trouble swallowing.    Eyes: Negative for blurred vision and pain.   Respiratory: Positive for cough (Intermittent, dry). Negative for sputum production, shortness of breath and wheezing.    Cardiovascular: Negative for chest pain and palpitations.   Gastrointestinal: Negative for abdominal pain, diarrhea, nausea and vomiting.   Musculoskeletal: Negative for myalgias.   Skin: Negative for rash.   Neurological: Positive for headaches. Negative for dizziness.       PMH:  has a past medical history of Generalized headaches; Other ovarian cyst, right side; Pelvic pain in female (10/02/2017); Plantar fasciitis; and Tendonitis.  MEDS:   Current Outpatient Prescriptions:   •  amoxicillin-clavulanate (AUGMENTIN) 875-125 MG Tab, Take 1 Tab by mouth 2 times a day for 7 days., Disp: 14 Tab, Rfl: 0  •  predniSONE (DELTASONE) 20 MG Tab, Take 2 tabs daily for 4 days, Disp: 8 Tab,  "Rfl: 0  •  benzonatate (TESSALON) 100 MG Cap, Take 1 Cap by mouth 3 times a day as needed for Cough., Disp: 30 Cap, Rfl: 0  •  Albuterol Sulfate 108 (90 Base) MCG/ACT AEROSOL POWDER, BREATH ACTIVATED, Inhale 1-2 Puffs by mouth every 6 hours as needed (coughing, wheezing)., Disp: 1 Each, Rfl: 0  ALLERGIES:   Allergies   Allergen Reactions   • Nkda [No Known Drug Allergy]    • Other Misc      \"Monocryl sutures\" part of incision came out and got drainage. Doctor said could be reason     SURGHX:   Past Surgical History:   Procedure Laterality Date   • PELVISCOPY N/A 10/4/2017    Procedure: PELVISCOPY;  Surgeon: Eliza Carrizales M.D.;  Location: SURGERY [facility];  Service: Gynecology   • CARPAL TUNNEL RELEASE Right 01/2017   • LAPAROSCOPY  7/22/2011    Performed by ROSS VELAZQUEZ at SURGERY [facility]     SOCHX:  reports that she has never smoked. She has never used smokeless tobacco. She reports that she drinks alcohol. She reports that she does not use drugs.  FH: Family history was reviewed, no pertinent findings to report     Objective:     /74 (BP Location: Left arm, Patient Position: Sitting, BP Cuff Size: Adult)   Pulse 86   Temp 37.6 °C (99.6 °F) (Temporal)   Resp 16   Wt 69.9 kg (154 lb)   SpO2 97%   BMI 24.12 kg/m²      Physical Exam   Constitutional: She is oriented to person, place, and time. She appears well-developed and well-nourished.   HENT:   Head: Normocephalic and atraumatic.   Right Ear: Tympanic membrane, external ear and ear canal normal.   Left Ear: Tympanic membrane, external ear and ear canal normal.   Nose: Mucosal edema present.   Mouth/Throat: Uvula is midline and mucous membranes are normal. No trismus in the jaw. No uvula swelling. Posterior oropharyngeal erythema present.   Eyes: Pupils are equal, round, and reactive to light. Conjunctivae are normal.   Neck: Normal range of motion.   Cardiovascular: Normal rate, regular rhythm and normal heart sounds.  "   No murmur heard.  Pulmonary/Chest: Effort normal and breath sounds normal. She has no wheezes.   Lymphadenopathy:     She has cervical adenopathy.   Neurological: She is alert and oriented to person, place, and time.   Skin: Skin is warm and dry. Capillary refill takes less than 2 seconds.   Psychiatric: She has a normal mood and affect. Her behavior is normal. Judgment normal.   Vitals reviewed.         Assessment/Plan:     1. Laryngitis  - amoxicillin-clavulanate (AUGMENTIN) 875-125 MG Tab; Take 1 Tab by mouth 2 times a day for 7 days.  Dispense: 14 Tab; Refill: 0  - predniSONE (DELTASONE) 20 MG Tab; Take 2 tabs daily for 4 days  Dispense: 8 Tab; Refill: 0  - benzonatate (TESSALON) 100 MG Cap; Take 1 Cap by mouth 3 times a day as needed for Cough.  Dispense: 30 Cap; Refill: 0        Differential Diagnosis, natural history, and supportive care discussed. Return to the Urgent Care or follow up with your PCP if symptoms fail to resolve, or for any new or worsening symptoms. Emergency room precautions discussed. Patient and/or family appears understanding of information.

## 2019-05-24 ENCOUNTER — HOSPITAL ENCOUNTER (EMERGENCY)
Facility: MEDICAL CENTER | Age: 34
End: 2019-05-24
Attending: EMERGENCY MEDICINE
Payer: COMMERCIAL

## 2019-05-24 ENCOUNTER — APPOINTMENT (OUTPATIENT)
Dept: RADIOLOGY | Facility: MEDICAL CENTER | Age: 34
End: 2019-05-24
Attending: EMERGENCY MEDICINE
Payer: COMMERCIAL

## 2019-05-24 VITALS
DIASTOLIC BLOOD PRESSURE: 95 MMHG | WEIGHT: 162.92 LBS | RESPIRATION RATE: 16 BRPM | BODY MASS INDEX: 25.57 KG/M2 | HEIGHT: 67 IN | HEART RATE: 72 BPM | OXYGEN SATURATION: 97 % | TEMPERATURE: 98.5 F | SYSTOLIC BLOOD PRESSURE: 131 MMHG

## 2019-05-24 DIAGNOSIS — R10.11 RIGHT UPPER QUADRANT PAIN: ICD-10-CM

## 2019-05-24 DIAGNOSIS — R10.11 POSTPRANDIAL RUQ PAIN: ICD-10-CM

## 2019-05-24 DIAGNOSIS — R11.0 NAUSEA: ICD-10-CM

## 2019-05-24 LAB
ALBUMIN SERPL BCP-MCNC: 4.4 G/DL (ref 3.2–4.9)
ALBUMIN/GLOB SERPL: 1.5 G/DL
ALP SERPL-CCNC: 99 U/L (ref 30–99)
ALT SERPL-CCNC: 11 U/L (ref 2–50)
ANION GAP SERPL CALC-SCNC: 8 MMOL/L (ref 0–11.9)
APPEARANCE UR: CLEAR
AST SERPL-CCNC: 16 U/L (ref 12–45)
BASOPHILS # BLD AUTO: 0.6 % (ref 0–1.8)
BASOPHILS # BLD: 0.04 K/UL (ref 0–0.12)
BILIRUB SERPL-MCNC: 0.4 MG/DL (ref 0.1–1.5)
BILIRUB UR QL STRIP.AUTO: NEGATIVE
BUN SERPL-MCNC: 13 MG/DL (ref 8–22)
CALCIUM SERPL-MCNC: 8.7 MG/DL (ref 8.4–10.2)
CHLORIDE SERPL-SCNC: 106 MMOL/L (ref 96–112)
CO2 SERPL-SCNC: 24 MMOL/L (ref 20–33)
COLOR UR: YELLOW
CREAT SERPL-MCNC: 0.92 MG/DL (ref 0.5–1.4)
EOSINOPHIL # BLD AUTO: 0.18 K/UL (ref 0–0.51)
EOSINOPHIL NFR BLD: 2.5 % (ref 0–6.9)
ERYTHROCYTE [DISTWIDTH] IN BLOOD BY AUTOMATED COUNT: 39.1 FL (ref 35.9–50)
GLOBULIN SER CALC-MCNC: 2.9 G/DL (ref 1.9–3.5)
GLUCOSE SERPL-MCNC: 92 MG/DL (ref 65–99)
GLUCOSE UR STRIP.AUTO-MCNC: NEGATIVE MG/DL
HCG SERPL QL: NEGATIVE
HCT VFR BLD AUTO: 40.5 % (ref 37–47)
HGB BLD-MCNC: 13.8 G/DL (ref 12–16)
IMM GRANULOCYTES # BLD AUTO: 0.02 K/UL (ref 0–0.11)
IMM GRANULOCYTES NFR BLD AUTO: 0.3 % (ref 0–0.9)
KETONES UR STRIP.AUTO-MCNC: NEGATIVE MG/DL
LEUKOCYTE ESTERASE UR QL STRIP.AUTO: NEGATIVE
LIPASE SERPL-CCNC: 35 U/L (ref 7–58)
LYMPHOCYTES # BLD AUTO: 2.5 K/UL (ref 1–4.8)
LYMPHOCYTES NFR BLD: 34.9 % (ref 22–41)
MCH RBC QN AUTO: 29.9 PG (ref 27–33)
MCHC RBC AUTO-ENTMCNC: 34.1 G/DL (ref 33.6–35)
MCV RBC AUTO: 87.7 FL (ref 81.4–97.8)
MICRO URNS: NORMAL
MONOCYTES # BLD AUTO: 0.47 K/UL (ref 0–0.85)
MONOCYTES NFR BLD AUTO: 6.6 % (ref 0–13.4)
NEUTROPHILS # BLD AUTO: 3.96 K/UL (ref 2–7.15)
NEUTROPHILS NFR BLD: 55.1 % (ref 44–72)
NITRITE UR QL STRIP.AUTO: NEGATIVE
NRBC # BLD AUTO: 0 K/UL
NRBC BLD-RTO: 0 /100 WBC
PH UR STRIP.AUTO: 6.5 [PH]
PLATELET # BLD AUTO: 219 K/UL (ref 164–446)
PMV BLD AUTO: 10.3 FL (ref 9–12.9)
POTASSIUM SERPL-SCNC: 3.4 MMOL/L (ref 3.6–5.5)
PROT SERPL-MCNC: 7.3 G/DL (ref 6–8.2)
PROT UR QL STRIP: NEGATIVE MG/DL
RBC # BLD AUTO: 4.62 M/UL (ref 4.2–5.4)
RBC UR QL AUTO: NEGATIVE
SODIUM SERPL-SCNC: 138 MMOL/L (ref 135–145)
SP GR UR STRIP.AUTO: 1.01
WBC # BLD AUTO: 7.2 K/UL (ref 4.8–10.8)

## 2019-05-24 PROCEDURE — 96375 TX/PRO/DX INJ NEW DRUG ADDON: CPT

## 2019-05-24 PROCEDURE — 80053 COMPREHEN METABOLIC PANEL: CPT

## 2019-05-24 PROCEDURE — 36415 COLL VENOUS BLD VENIPUNCTURE: CPT

## 2019-05-24 PROCEDURE — 99285 EMERGENCY DEPT VISIT HI MDM: CPT

## 2019-05-24 PROCEDURE — 84703 CHORIONIC GONADOTROPIN ASSAY: CPT

## 2019-05-24 PROCEDURE — 85025 COMPLETE CBC W/AUTO DIFF WBC: CPT

## 2019-05-24 PROCEDURE — 700111 HCHG RX REV CODE 636 W/ 250 OVERRIDE (IP): Performed by: EMERGENCY MEDICINE

## 2019-05-24 PROCEDURE — 96374 THER/PROPH/DIAG INJ IV PUSH: CPT

## 2019-05-24 PROCEDURE — 76705 ECHO EXAM OF ABDOMEN: CPT

## 2019-05-24 PROCEDURE — 81003 URINALYSIS AUTO W/O SCOPE: CPT

## 2019-05-24 PROCEDURE — 83690 ASSAY OF LIPASE: CPT

## 2019-05-24 RX ORDER — IBUPROFEN 200 MG
600 TABLET ORAL EVERY 8 HOURS PRN
Status: SHIPPED | COMMUNITY
End: 2021-03-13

## 2019-05-24 RX ORDER — MONTELUKAST SODIUM 10 MG/1
10 TABLET ORAL DAILY
Status: SHIPPED | COMMUNITY
End: 2021-01-27

## 2019-05-24 RX ORDER — LEVOCETIRIZINE DIHYDROCHLORIDE 5 MG/1
5 TABLET, FILM COATED ORAL 2 TIMES DAILY PRN
Status: SHIPPED | COMMUNITY
End: 2023-08-24

## 2019-05-24 RX ORDER — KETOROLAC TROMETHAMINE 30 MG/ML
10 INJECTION, SOLUTION INTRAMUSCULAR; INTRAVENOUS ONCE
Status: COMPLETED | OUTPATIENT
Start: 2019-05-24 | End: 2019-05-24

## 2019-05-24 RX ORDER — ONDANSETRON 2 MG/ML
4 INJECTION INTRAMUSCULAR; INTRAVENOUS ONCE
Status: COMPLETED | OUTPATIENT
Start: 2019-05-24 | End: 2019-05-24

## 2019-05-24 RX ORDER — HYDROCODONE BITARTRATE AND ACETAMINOPHEN 5; 325 MG/1; MG/1
1 TABLET ORAL EVERY 6 HOURS PRN
Qty: 9 TAB | Refills: 0 | Status: SHIPPED | OUTPATIENT
Start: 2019-05-24 | End: 2019-05-27

## 2019-05-24 RX ORDER — ONDANSETRON 4 MG/1
4 TABLET, ORALLY DISINTEGRATING ORAL EVERY 6 HOURS PRN
Qty: 10 TAB | Refills: 0 | Status: SHIPPED | OUTPATIENT
Start: 2019-05-24 | End: 2021-01-27

## 2019-05-24 RX ADMIN — ONDANSETRON 4 MG: 2 INJECTION INTRAMUSCULAR; INTRAVENOUS at 16:17

## 2019-05-24 RX ADMIN — KETOROLAC TROMETHAMINE 9.99 MG: 30 INJECTION, SOLUTION INTRAMUSCULAR at 16:17

## 2019-05-24 NOTE — ED NOTES
ERP at bedside. Pt agrees with plan of care discussed by ERP. IV established. Blood sent to lab. AIDET acknowledged with patient. Therese in low position, side rail up for pt safety. Call light within reach. Will continue to monitor.

## 2019-05-24 NOTE — ED NOTES
Discharge information reviewed in detail. Pt verbalized understanding of discharge instructions to follow up with PCP and to return to ER if condition worsens. Pt educated/expressed awareness of not driving or operating heavy machinery. Controlled release form signed and placed on chart.Patient educated no combining of alcohol with other sedating medications. Pt ambulated out of ER in a steady gait.

## 2019-05-24 NOTE — ED NOTES
Med Rec completed per patient  Allergies reviewed  No ORAL antibiotics in last 30 days    Patient has a Mirena that was placed about 4 years ago

## 2019-05-24 NOTE — ED TRIAGE NOTES
Chief Complaint   Patient presents with   • RUQ Pain     Onset while eating last night, reports as being sharp. Once again today after she ate lunch the pain returned, + nausea

## 2019-05-24 NOTE — ED PROVIDER NOTES
ED Provider Note    Chief Complaint:   Right upper quadrant pain    HPI:  Mckenzie Cash is a 33 y.o. female who presents with chief complaint of right upper quadrant pain.  Onset of symptoms was yesterday, she ate dinner and then had acute onset right upper quadrant pain described as colicky with associated nausea.  Over the course of the evening, her symptoms improved though did not completely resolved.  She did have loss of appetite, went most of today without eating.  When she had a meal earlier today her right upper quadrant pain recurred.  She describes it is moderate to severe, no significant alleviation with ibuprofen.  She is had no associated vomiting but does have persistent nausea.  She denies lower abdominal pain, denies shortness of breath, denies chest pain.  She has had no associated fevers, no skin changes, no jaundice.  She has no pelvic pain, no vaginal bleeding, no vaginal discharge out of the ordinary.  She does have a history of endometriosis with no change in her symptoms today.    She is no headaches, no neck or back pain, no history of impaired immunity, no hyperglycemia, no polydipsia, no polyuria, no abnormal bleeding or bruising.    She does report intermittent similar symptoms over the past 1 to 2 years, she reports a normal right upper quadrant ultrasound 2 years ago.  She was scheduled for an outpatient HIDA scan however her symptoms improved and she did not follow through with that study.    Review of Systems:  See HPI for pertinent positives and negatives. All other systems negative.    Past Medical History:   has a past medical history of Generalized headaches; Other ovarian cyst, right side; Pelvic pain in female (10/02/2017); Plantar fasciitis; and Tendonitis.    Social History:  Social History     Social History Main Topics   • Smoking status: Never Smoker   • Smokeless tobacco: Never Used   • Alcohol use Yes      Comment: Rarely    • Drug use: No   • Sexual activity:  "Not on file       Surgical History:   has a past surgical history that includes laparoscopy (7/22/2011); carpal tunnel release (Right, 01/2017); and pelviscopy (N/A, 10/4/2017).    Current Medications:  Home Medications     Reviewed by Natalie Blue (Pharmacy Tech) on 05/24/19 at 1527  Med List Status: Complete   Medication Last Dose Status   ibuprofen (MOTRIN) 200 MG Tab 5/24/2019 Active   Levocetirizine Dihydrochloride (XYZAL) 5 MG Tab 5/24/2019 Active   levonorgestrel (MIRENA, 52 MG,) 20 MCG/24HR IUD 4 YEARS AGO Active   montelukast (SINGULAIR) 10 MG Tab 5/24/2019 Active                Allergies:  Allergies   Allergen Reactions   • Nkda [No Known Drug Allergy]    • Other Misc      \"Monocryl sutures\" part of incision came out and got drainage. Doctor said could be reason       Physical Exam:  Vital Signs: /95   Pulse 72   Temp 36.9 °C (98.5 °F) (Temporal)   Resp 16   Ht 1.702 m (5' 7\")   Wt 73.9 kg (162 lb 14.7 oz)   LMP  (Exact Date)   SpO2 97%   BMI 25.52 kg/m²   Constitutional: Alert, no acute distress  HENT: Moist mucus membranes, normal posterior pharynx, no intraoral lesions  Eyes: Pupils equal and reactive, normal conjunctiva  Neck: Supple, normal range of motion, no stridor  Cardiovascular: Extremities are warm and well perfused, no murmur appreciated, normal cardiac auscultation  Pulmonary: No respiratory distress, normal work of breathing, no accessory muscule usage, breath sounds clear and equal bilaterally  Abdomen: Soft, mild right upper quadrant tenderness, negative Barajas sign, no peritoneal signs, no epigastric tenderness, no abdominal distention  Skin: Warm, dry, no rashes or lesions  Musculoskeletal: Normal range of motion in all extremities, no swelling or deformity noted  Neurologic: Alert, oriented, normal speech, normal motor function  Psychiatric: Normal and appropriate mood and affect    Medical records reviewed for continuity of care.  No recent visits for similar " symptoms.  She was seen earlier this year for pharyngitis.  Treated with Augmentin in March 2019.    Labs:  Labs Reviewed   COMP METABOLIC PANEL - Abnormal; Notable for the following:        Result Value    Potassium 3.4 (*)     All other components within normal limits   HCG QUAL SERUM   CBC WITH DIFFERENTIAL   LIPASE   URINALYSIS,CULTURE IF INDICATED   ESTIMATED GFR       Radiology:  US-RUQ   Final Result      No evidence of gallstone or evidence of biliary ductal dilatation.           ED Medications Administered:  Medications   ketorolac (TORADOL) injection 9.99 mg (9.99 mg Intravenous Given 5/24/19 1617)   ondansetron (ZOFRAN) syringe/vial injection 4 mg (4 mg Intravenous Given 5/24/19 1617)       Differential diagnosis:  Cholelithiasis, biliary dyskinesia, cholecystitis, hepatitis    MDM:  History and physical exam as documented above. Patient presents with postprandial right upper quadrant pain.  Pain and nausea were treated with Toradol and Zofran in the emergency department.    On laboratory evaluation CMP is within normal limits, she has no transaminitis, normal bilirubin.  No evidence of obstructive biliary pathology.  Lipase is within normal limits without evidence of pancreatitis.  hCG is negative ruling out pregnancy and pregnancy related complications.  Urinalysis is negative for evidence of infection.  She has a normal white blood count, she is afebrile, no evidence of infectious etiology.    Right upper quadrant ultrasound demonstrates no evidence of gallstones, no biliary ductal dilatation.    Her symptoms had improved after receiving Toradol and Zofran.  I did offer admission for inpatient HIDA scan given her worsening of symptoms over the past 12 hours.  Alternatively, I do believe outpatient pain control and imaging is quite reasonable as well given her normal vital signs, benign exam, and reassuring labs.  She would prefer to be discharged home and to follow-up with her primary care physician to  reorder the HIDA scan and for further outpatient follow-up.  I did provide a small amount of Norco for breakthrough pain, but counseled the patient to return to the emergency department immediately if her pain worsens, if it is not alleviated with a single dose of Norco, if she is not able to tolerate food or fluids, or if she develops fevers, jaundice, or if she has any further concerns.  Additionally, she will return if no improvement in 12 to 24 hours.    Blood pressure today is greater than 120/80, patient is instructed to follow up with primary care provider for blood pressure recheck.    Disposition:  Discharge home in stable condition    Final Impression:  1. Right upper quadrant pain    2. Postprandial RUQ pain    3. Nausea      Electronically signed by: Rupa Love, 5/24/2019 7:59 PM

## 2019-06-03 ENCOUNTER — HOSPITAL ENCOUNTER (OUTPATIENT)
Dept: LAB | Facility: MEDICAL CENTER | Age: 34
End: 2019-06-03
Attending: OBSTETRICS & GYNECOLOGY
Payer: COMMERCIAL

## 2019-06-03 LAB
C TRACH DNA SPEC QL NAA+PROBE: NEGATIVE
N GONORRHOEA DNA SPEC QL NAA+PROBE: NEGATIVE
SPECIMEN SOURCE: NORMAL
TREPONEMA PALLIDUM IGG+IGM AB [PRESENCE] IN SERUM OR PLASMA BY IMMUNOASSAY: NON REACTIVE

## 2019-06-03 PROCEDURE — 87591 N.GONORRHOEAE DNA AMP PROB: CPT

## 2019-06-03 PROCEDURE — 86780 TREPONEMA PALLIDUM: CPT

## 2019-06-03 PROCEDURE — 36415 COLL VENOUS BLD VENIPUNCTURE: CPT

## 2019-06-03 PROCEDURE — 87491 CHLMYD TRACH DNA AMP PROBE: CPT

## 2019-06-11 ENCOUNTER — HOSPITAL ENCOUNTER (EMERGENCY)
Facility: MEDICAL CENTER | Age: 34
End: 2019-06-12
Attending: EMERGENCY MEDICINE
Payer: COMMERCIAL

## 2019-06-11 DIAGNOSIS — R10.31 RLQ ABDOMINAL PAIN: ICD-10-CM

## 2019-06-11 DIAGNOSIS — R11.2 NON-INTRACTABLE VOMITING WITH NAUSEA, UNSPECIFIED VOMITING TYPE: ICD-10-CM

## 2019-06-11 LAB
ALBUMIN SERPL BCP-MCNC: 4.6 G/DL (ref 3.2–4.9)
ALBUMIN/GLOB SERPL: 1.4 G/DL
ALP SERPL-CCNC: 90 U/L (ref 30–99)
ALT SERPL-CCNC: 14 U/L (ref 2–50)
ANION GAP SERPL CALC-SCNC: 9 MMOL/L (ref 0–11.9)
AST SERPL-CCNC: 17 U/L (ref 12–45)
BASOPHILS # BLD AUTO: 0.3 % (ref 0–1.8)
BASOPHILS # BLD: 0.02 K/UL (ref 0–0.12)
BILIRUB SERPL-MCNC: 0.5 MG/DL (ref 0.1–1.5)
BUN SERPL-MCNC: 12 MG/DL (ref 8–22)
CALCIUM SERPL-MCNC: 9 MG/DL (ref 8.5–10.5)
CHLORIDE SERPL-SCNC: 107 MMOL/L (ref 96–112)
CO2 SERPL-SCNC: 25 MMOL/L (ref 20–33)
CREAT SERPL-MCNC: 0.85 MG/DL (ref 0.5–1.4)
EOSINOPHIL # BLD AUTO: 0.1 K/UL (ref 0–0.51)
EOSINOPHIL NFR BLD: 1.6 % (ref 0–6.9)
ERYTHROCYTE [DISTWIDTH] IN BLOOD BY AUTOMATED COUNT: 40.5 FL (ref 35.9–50)
GLOBULIN SER CALC-MCNC: 3.2 G/DL (ref 1.9–3.5)
GLUCOSE SERPL-MCNC: 89 MG/DL (ref 65–99)
HCG SERPL QL: NEGATIVE
HCT VFR BLD AUTO: 42.3 % (ref 37–47)
HGB BLD-MCNC: 14.1 G/DL (ref 12–16)
IMM GRANULOCYTES # BLD AUTO: 0.02 K/UL (ref 0–0.11)
IMM GRANULOCYTES NFR BLD AUTO: 0.3 % (ref 0–0.9)
LIPASE SERPL-CCNC: 14 U/L (ref 11–82)
LYMPHOCYTES # BLD AUTO: 1.46 K/UL (ref 1–4.8)
LYMPHOCYTES NFR BLD: 22.7 % (ref 22–41)
MCH RBC QN AUTO: 30.1 PG (ref 27–33)
MCHC RBC AUTO-ENTMCNC: 33.3 G/DL (ref 33.6–35)
MCV RBC AUTO: 90.2 FL (ref 81.4–97.8)
MONOCYTES # BLD AUTO: 0.48 K/UL (ref 0–0.85)
MONOCYTES NFR BLD AUTO: 7.5 % (ref 0–13.4)
NEUTROPHILS # BLD AUTO: 4.35 K/UL (ref 2–7.15)
NEUTROPHILS NFR BLD: 67.6 % (ref 44–72)
NRBC # BLD AUTO: 0 K/UL
NRBC BLD-RTO: 0 /100 WBC
PLATELET # BLD AUTO: 232 K/UL (ref 164–446)
PMV BLD AUTO: 10.5 FL (ref 9–12.9)
POTASSIUM SERPL-SCNC: 3.7 MMOL/L (ref 3.6–5.5)
PROT SERPL-MCNC: 7.8 G/DL (ref 6–8.2)
RBC # BLD AUTO: 4.69 M/UL (ref 4.2–5.4)
SODIUM SERPL-SCNC: 141 MMOL/L (ref 135–145)
WBC # BLD AUTO: 6.4 K/UL (ref 4.8–10.8)

## 2019-06-11 PROCEDURE — 99285 EMERGENCY DEPT VISIT HI MDM: CPT

## 2019-06-11 PROCEDURE — 84703 CHORIONIC GONADOTROPIN ASSAY: CPT

## 2019-06-11 PROCEDURE — 83690 ASSAY OF LIPASE: CPT

## 2019-06-11 PROCEDURE — 36415 COLL VENOUS BLD VENIPUNCTURE: CPT

## 2019-06-11 PROCEDURE — 85025 COMPLETE CBC W/AUTO DIFF WBC: CPT

## 2019-06-11 PROCEDURE — 80053 COMPREHEN METABOLIC PANEL: CPT

## 2019-06-11 RX ORDER — ONDANSETRON 2 MG/ML
4 INJECTION INTRAMUSCULAR; INTRAVENOUS ONCE
Status: COMPLETED | OUTPATIENT
Start: 2019-06-12 | End: 2019-06-12

## 2019-06-11 RX ORDER — KETOROLAC TROMETHAMINE 30 MG/ML
15 INJECTION, SOLUTION INTRAMUSCULAR; INTRAVENOUS ONCE
Status: COMPLETED | OUTPATIENT
Start: 2019-06-12 | End: 2019-06-12

## 2019-06-11 ASSESSMENT — PAIN DESCRIPTION - DESCRIPTORS: DESCRIPTORS: STABBING

## 2019-06-12 ENCOUNTER — APPOINTMENT (OUTPATIENT)
Dept: RADIOLOGY | Facility: MEDICAL CENTER | Age: 34
End: 2019-06-12
Attending: EMERGENCY MEDICINE
Payer: COMMERCIAL

## 2019-06-12 VITALS
HEART RATE: 96 BPM | DIASTOLIC BLOOD PRESSURE: 70 MMHG | BODY MASS INDEX: 25.19 KG/M2 | HEIGHT: 67 IN | RESPIRATION RATE: 18 BRPM | WEIGHT: 160.5 LBS | TEMPERATURE: 98.9 F | SYSTOLIC BLOOD PRESSURE: 134 MMHG

## 2019-06-12 LAB
APPEARANCE UR: CLEAR
BILIRUB UR QL STRIP.AUTO: NEGATIVE
COLOR UR: YELLOW
GLUCOSE UR STRIP.AUTO-MCNC: NEGATIVE MG/DL
KETONES UR STRIP.AUTO-MCNC: NEGATIVE MG/DL
LEUKOCYTE ESTERASE UR QL STRIP.AUTO: NEGATIVE
MICRO URNS: NORMAL
NITRITE UR QL STRIP.AUTO: NEGATIVE
PH UR STRIP.AUTO: 5.5 [PH]
PROT UR QL STRIP: NEGATIVE MG/DL
RBC UR QL AUTO: NEGATIVE
SP GR UR STRIP.AUTO: 1.01
UROBILINOGEN UR STRIP.AUTO-MCNC: 1 MG/DL

## 2019-06-12 PROCEDURE — 81003 URINALYSIS AUTO W/O SCOPE: CPT

## 2019-06-12 PROCEDURE — 76856 US EXAM PELVIC COMPLETE: CPT

## 2019-06-12 PROCEDURE — 700111 HCHG RX REV CODE 636 W/ 250 OVERRIDE (IP): Performed by: EMERGENCY MEDICINE

## 2019-06-12 PROCEDURE — 76705 ECHO EXAM OF ABDOMEN: CPT

## 2019-06-12 PROCEDURE — 74177 CT ABD & PELVIS W/CONTRAST: CPT

## 2019-06-12 PROCEDURE — 96374 THER/PROPH/DIAG INJ IV PUSH: CPT

## 2019-06-12 PROCEDURE — 700117 HCHG RX CONTRAST REV CODE 255: Performed by: EMERGENCY MEDICINE

## 2019-06-12 PROCEDURE — 96375 TX/PRO/DX INJ NEW DRUG ADDON: CPT

## 2019-06-12 RX ORDER — ONDANSETRON 4 MG/1
4 TABLET, ORALLY DISINTEGRATING ORAL EVERY 8 HOURS PRN
Qty: 10 TAB | Refills: 0 | Status: SHIPPED | OUTPATIENT
Start: 2019-06-12 | End: 2021-01-27

## 2019-06-12 RX ADMIN — KETOROLAC TROMETHAMINE 15 MG: 30 INJECTION, SOLUTION INTRAMUSCULAR at 01:24

## 2019-06-12 RX ADMIN — ONDANSETRON 4 MG: 2 INJECTION INTRAMUSCULAR; INTRAVENOUS at 01:24

## 2019-06-12 RX ADMIN — IOHEXOL 100 ML: 350 INJECTION, SOLUTION INTRAVENOUS at 03:43

## 2019-06-12 NOTE — ED NOTES
Pt educated on follow up care with primary care physician and medication adherence, pt verbalized understanding of all instructions and had no questions, pt ambulated to waiting room unassisted

## 2019-06-12 NOTE — DISCHARGE INSTRUCTIONS
Return if you have increasing pain, fever, severe vomiting, blood in vomit, blood in stool or not improving in 24 hours.

## 2019-06-12 NOTE — ED PROVIDER NOTES
ED Provider Note    Scribed for Andrews Alberts M.D. by Kelly Nascimento. 6/11/2019, 11:50 PM.    Primary care provider: Rogerio Monaco M.D.  Means of arrival: Walk in  History obtained from: Patient  History limited by: None    CHIEF COMPLAINT  Chief Complaint   Patient presents with   • RLQ Pain   • Vomiting       HPI  Mckenzie Cash is a 34 y.o. female who presents to the Emergency Department complaining of right lower quadrant abdominal pain onset 8 PM tonight. The patient states the pain began in the right lower quadrant and has diffuse to her entire abdomen. She reports two episodes of Non bilious or bloody emesis today. The patient reports general malaise onset 3 days ago and has been unable to eat due to the nausea. The patient has associated nausea, diarrhea. She denies dysuria, cough, or chest pain. She has a history of ovarian cysts. The patient admits no new sexual partners or chance of pregnancy.     REVIEW OF SYSTEMS  Pertinent positives include abdominal pain, nausea, vomiting, diarrhea. Pertinent negatives include dysuria, cough, or chest pain. All other systems negative.    PAST MEDICAL HISTORY   has a past medical history of Generalized headaches; Other ovarian cyst, right side; Pelvic pain in female (10/02/2017); Plantar fasciitis; and Tendonitis.    SURGICAL HISTORY   has a past surgical history that includes laparoscopy (7/22/2011); carpal tunnel release (Right, 01/2017); and pelviscopy (N/A, 10/4/2017).    SOCIAL HISTORY  Social History   Substance Use Topics   • Smoking status: Never Smoker   • Smokeless tobacco: Never Used   • Alcohol use Yes      Comment: Rarely       History   Drug Use No       FAMILY HISTORY  Family History   Problem Relation Age of Onset   • Adopted: Yes       CURRENT MEDICATIONS  Home Medications     Reviewed by Lorri Valdez R.N. (Registered Nurse) on 06/11/19 at 3854  Med List Status: <None>   Medication Last Dose Status   ibuprofen (MOTRIN)  "200 MG Tab  Active   Levocetirizine Dihydrochloride (XYZAL) 5 MG Tab  Active   levonorgestrel (MIRENA, 52 MG,) 20 MCG/24HR IUD  Active   montelukast (SINGULAIR) 10 MG Tab  Active   ondansetron (ZOFRAN ODT) 4 MG TABLET DISPERSIBLE  Active                ALLERGIES  Allergies   Allergen Reactions   • Nkda [No Known Drug Allergy]    • Other Misc      \"Monocryl sutures\" part of incision came out and got drainage. Doctor said could be reason       PHYSICAL EXAM  VITAL SIGNS: /99   Pulse 95   Temp 37.2 °C (98.9 °F) (Temporal)   Resp 18   Ht 1.702 m (5' 7\")   Wt 72.8 kg (160 lb 7.9 oz)   BMI 25.14 kg/m²      Constitutional: Well developed, Well nourished, mild distress.   HENT: Normocephalic, Atraumatic.   Eyes: No sclerae icterus. Conjunctiva normal, No discharge.   Cardiovascular: Normal heart rate, Normal rhythm, No murmurs, equal pulses.   Pulmonary: Normal breath sounds, No respiratory distress, No wheezing, No rales, No rhonchi.  Abdomen:Tenderness mcburneys point. Negative Rovsing's sign. Soft, No tenderness, No masses, no rebound, no guarding.   Back: No CVA tenderness.   Musculoskeletal: No major deformities noted, No tenderness.   Skin: Warm, Dry, No erythema, No rash.   Neurologic: Alert & oriented x 3, Normal motor function,  No focal deficits noted.   Psychiatric: Affect normal, Judgment normal, Mood normal.     LABS  Results for orders placed or performed during the hospital encounter of 06/11/19   CBC WITH DIFFERENTIAL   Result Value Ref Range    WBC 6.4 4.8 - 10.8 K/uL    RBC 4.69 4.20 - 5.40 M/uL    Hemoglobin 14.1 12.0 - 16.0 g/dL    Hematocrit 42.3 37.0 - 47.0 %    MCV 90.2 81.4 - 97.8 fL    MCH 30.1 27.0 - 33.0 pg    MCHC 33.3 (L) 33.6 - 35.0 g/dL    RDW 40.5 35.9 - 50.0 fL    Platelet Count 232 164 - 446 K/uL    MPV 10.5 9.0 - 12.9 fL    Neutrophils-Polys 67.60 44.00 - 72.00 %    Lymphocytes 22.70 22.00 - 41.00 %    Monocytes 7.50 0.00 - 13.40 %    Eosinophils 1.60 0.00 - 6.90 %    Basophils " 0.30 0.00 - 1.80 %    Immature Granulocytes 0.30 0.00 - 0.90 %    Nucleated RBC 0.00 /100 WBC    Neutrophils (Absolute) 4.35 2.00 - 7.15 K/uL    Lymphs (Absolute) 1.46 1.00 - 4.80 K/uL    Monos (Absolute) 0.48 0.00 - 0.85 K/uL    Eos (Absolute) 0.10 0.00 - 0.51 K/uL    Baso (Absolute) 0.02 0.00 - 0.12 K/uL    Immature Granulocytes (abs) 0.02 0.00 - 0.11 K/uL    NRBC (Absolute) 0.00 K/uL   COMP METABOLIC PANEL   Result Value Ref Range    Sodium 141 135 - 145 mmol/L    Potassium 3.7 3.6 - 5.5 mmol/L    Chloride 107 96 - 112 mmol/L    Co2 25 20 - 33 mmol/L    Anion Gap 9.0 0.0 - 11.9    Glucose 89 65 - 99 mg/dL    Bun 12 8 - 22 mg/dL    Creatinine 0.85 0.50 - 1.40 mg/dL    Calcium 9.0 8.5 - 10.5 mg/dL    AST(SGOT) 17 12 - 45 U/L    ALT(SGPT) 14 2 - 50 U/L    Alkaline Phosphatase 90 30 - 99 U/L    Total Bilirubin 0.5 0.1 - 1.5 mg/dL    Albumin 4.6 3.2 - 4.9 g/dL    Total Protein 7.8 6.0 - 8.2 g/dL    Globulin 3.2 1.9 - 3.5 g/dL    A-G Ratio 1.4 g/dL   LIPASE   Result Value Ref Range    Lipase 14 11 - 82 U/L   HCG QUAL SERUM   Result Value Ref Range    Beta-Hcg Qualitative Serum Negative Negative   URINALYSIS,CULTURE IF INDICATED   Result Value Ref Range    Color Yellow     Character Clear     Specific Gravity 1.009 <1.035    Ph 5.5 5.0 - 8.0    Glucose Negative Negative mg/dL    Ketones Negative Negative mg/dL    Protein Negative Negative mg/dL    Bilirubin Negative Negative    Urobilinogen, Urine 1.0 Negative    Nitrite Negative Negative    Leukocyte Esterase Negative Negative    Occult Blood Negative Negative    Micro Urine Req see below    ESTIMATED GFR   Result Value Ref Range    GFR If African American >60 >60 mL/min/1.73 m 2    GFR If Non African American >60 >60 mL/min/1.73 m 2   .    All labs reviewed by me.      RADIOLOGY  CT-ABDOMEN-PELVIS WITH   Final Result         1.  No acute abnormality. Nonvisualization of the appendix, cannot definitively exclude appendicitis.   2.  Low-density right hepatic lobe  lesion, could represent small cyst or hemangioma, otherwise indeterminate, new since prior study. Could be further evaluated with three-phase CT of the liver.      US-PELVIC COMPLETE (TRANSABDOMINAL/TRANSVAGINAL) (COMBO)   Final Result         1.  Small free fluid collection in the right adnexa   2.  Heterogeneous lesion in the left ovary, could represent a hemorrhagic cyst, endometrioma, resolving corpus luteal cyst, otherwise indeterminate. Recommend six-week follow-up pelvic sonogram for repeat characterization.      US-APPENDIX   Final Result         1.  Nonvisualization of the appendix, cannot definitively evaluate for and/or exclude appendicitis.   2.  Small free fluid collection in the pelvis.        The radiologist's interpretation of all radiological studies have been reviewed by me.    COURSE & MEDICAL DECISION MAKING  Pertinent Labs & Imaging studies reviewed. (See chart for details)    11:50 PM - Patient seen and examined at bedside. Patient will be treated with Zofrran 4 mg and Toradol 15 mg.  Ordered US-Pelvic, US-Appendix, Estimated GFR, CBC, CMP, Lipase, HCG Qual, and UA to evaluate her symptoms. The differential diagnoses include but are not limited to: ovarian cyst, appendicitis, ovarian torsion, gastroenteritis, and colitis.     1:47 PM - Patient was reevaluated at bedside. The patient reports continued right lower quadrant abdominal pain.  Discussed lab and radiology  results with the patient. Informed her that her appendix was not visible on the ultrasound and an ovarian cyst was found on her left ovary. No conclusive explanation for her continued pain was found and CT- Abdomen will be ordered for further evaluation.     Reexamined the patient at 3:50 AM she is feeling better.  Discussed CT results and the fact that she could still have an early appendicitis not seen on CT since we did not visualize this appendix    Medical Decision Making: At this point time I do not have a clear explanation  patient's right lower quadrant abdominal pain.  Her white count is normal.  She does not have a fever but she had continued pain in the right lower quadrant.  She does not show any signs of a ovarian torsion or ovarian cyst on the right side.  At this point time my suspicion for appendicitis is very low but I cannot fully rule it out since it was not visualized on any imaging.  Patient understands that she is to return in 24 hours if not improving or getting worse.     The patient will return for new or worsening symptoms and is stable at the time of discharge.    The patient is referred to a primary physician for blood pressure management, diabetic screening, and for all other preventative health concerns.        DISPOSITION:  Patient will be discharged home in stable condition.    FOLLOW UP:  Rogerio Monaco M.D.  [address]  [address]  [facility] [address]  [phone]    Schedule an appointment as soon as possible for a visit in 1 week  Have them follow up with an ultrasound of your ovary in 6 weeks.    [facility], Emergency Dept  [address] [facility] [address] [phone]  In 1 day  If not improving or getting wose      OUTPATIENT MEDICATIONS:  New Prescriptions    ONDANSETRON (ZOFRAN ODT) 4 MG TABLET DISPERSIBLE    Take 1 Tab by mouth every 8 hours as needed.          FINAL IMPRESSION  1. RLQ abdominal pain    2. Non-intractable vomiting with nausea, unspecified vomiting type          I, Kelly Nascimento (Scribsailaja), am scribing for, and in the presence of, Andrews Alberts M.D.    Electronically signed by: Kelly Heaton), 6/11/2019    IAndrews M.D. personally performed the services described in this documentation, as scribed by Kelly Nascimento in my presence, and it is both accurate and complete.  C  The note accurately reflects work and decisions made by me.  Andrews Alberst  6/12/2019  4:01 AM

## 2019-06-12 NOTE — ED TRIAGE NOTES
"Mckenzie Priya Cash  Chief Complaint   Patient presents with   • RLQ Pain   • Vomiting     Pt ambulatory to triage with above complaint. Pt states RLQ pain started approx 1.5 hrs ago. Pt states she was seen a couple days ago for gallbladder, but states pain today is NOT similar. Pt also reports along with RLQ pain, she reports referred pain to RIGHT lower anterior neck. Pt states she has also had diarrhea for the past 4 days.     /99   Pulse 95   Temp 37.2 °C (98.9 °F) (Temporal)   Resp 18   Ht 1.702 m (5' 7\")   Wt 72.8 kg (160 lb 7.9 oz)   BMI 25.14 kg/m²     Pt informed of triage process and encouraged to notify staff of any changes or concerns. Pt verbalized understanding of instructions. Apologized for long wait time. Pt placed back in lobby.     "

## 2019-07-25 ENCOUNTER — HOSPITAL ENCOUNTER (OUTPATIENT)
Dept: RADIOLOGY | Facility: MEDICAL CENTER | Age: 34
End: 2019-07-25
Attending: OBSTETRICS & GYNECOLOGY
Payer: COMMERCIAL

## 2019-07-25 DIAGNOSIS — N83.292 COMPLEX CYST OF LEFT OVARY: ICD-10-CM

## 2019-07-25 DIAGNOSIS — R10.2 ADNEXAL TENDERNESS, RIGHT: ICD-10-CM

## 2019-07-25 PROCEDURE — 76830 TRANSVAGINAL US NON-OB: CPT

## 2019-08-20 ENCOUNTER — HOSPITAL ENCOUNTER (OUTPATIENT)
Dept: LAB | Facility: MEDICAL CENTER | Age: 34
End: 2019-08-20
Attending: OBSTETRICS & GYNECOLOGY
Payer: COMMERCIAL

## 2019-08-20 PROCEDURE — 88175 CYTOPATH C/V AUTO FLUID REDO: CPT

## 2019-08-20 PROCEDURE — 87624 HPV HI-RISK TYP POOLED RSLT: CPT

## 2019-08-21 LAB
CYTOLOGY REG CYTOL: NORMAL
HPV HR 12 DNA CVX QL NAA+PROBE: NEGATIVE
HPV16 DNA SPEC QL NAA+PROBE: NEGATIVE
HPV18 DNA SPEC QL NAA+PROBE: NEGATIVE
SPECIMEN SOURCE: NORMAL

## 2019-12-20 ENCOUNTER — OFFICE VISIT (OUTPATIENT)
Dept: URGENT CARE | Facility: PHYSICIAN GROUP | Age: 34
End: 2019-12-20
Payer: COMMERCIAL

## 2019-12-20 VITALS
HEIGHT: 67 IN | BODY MASS INDEX: 25.11 KG/M2 | TEMPERATURE: 97.6 F | RESPIRATION RATE: 18 BRPM | HEART RATE: 76 BPM | WEIGHT: 160 LBS | SYSTOLIC BLOOD PRESSURE: 112 MMHG | OXYGEN SATURATION: 99 % | DIASTOLIC BLOOD PRESSURE: 76 MMHG

## 2019-12-20 DIAGNOSIS — J03.90 EXUDATIVE TONSILLITIS: ICD-10-CM

## 2019-12-20 LAB
HETEROPH AB SER QL LA: NEGATIVE
INT CON NEG: NEGATIVE
INT CON NEG: NEGATIVE
INT CON POS: POSITIVE
INT CON POS: POSITIVE
S PYO AG THROAT QL: POSITIVE

## 2019-12-20 PROCEDURE — 99214 OFFICE O/P EST MOD 30 MIN: CPT | Performed by: PHYSICIAN ASSISTANT

## 2019-12-20 PROCEDURE — 86308 HETEROPHILE ANTIBODY SCREEN: CPT | Performed by: PHYSICIAN ASSISTANT

## 2019-12-20 PROCEDURE — 87880 STREP A ASSAY W/OPTIC: CPT | Performed by: PHYSICIAN ASSISTANT

## 2019-12-20 RX ORDER — AMOXICILLIN 875 MG/1
875 TABLET, COATED ORAL 2 TIMES DAILY
Qty: 20 TAB | Refills: 0 | Status: SHIPPED | OUTPATIENT
Start: 2019-12-20 | End: 2019-12-30

## 2019-12-20 ASSESSMENT — ENCOUNTER SYMPTOMS
SWOLLEN GLANDS: 1
VOMITING: 0
SHORTNESS OF BREATH: 0
COUGH: 0
MYALGIAS: 1
SPUTUM PRODUCTION: 0
WHEEZING: 0
CHILLS: 0
SORE THROAT: 1
NECK PAIN: 0
NAUSEA: 0
HEADACHES: 0
DIARRHEA: 0
ABDOMINAL PAIN: 0
PALPITATIONS: 0
FEVER: 0

## 2019-12-21 NOTE — PROGRESS NOTES
"Subjective:      Mckenzie Cash is a 34 y.o. female who presents with Sore Throat (sore throat x 4 days, white spots on the back of the mouth, went to see a docor 2 weeks ago and dx with sinus infection and was given a antibiotic omnicef and was finished yesterday)            Pharyngitis    This is a new problem. Episode onset: 4 days. Patient just completed 2 weeks of Omnicef for a sinus infection. She noticed pus on her left tonsil today. The problem has been gradually worsening. There has been no fever. Associated symptoms include swollen glands. Pertinent negatives include no abdominal pain, congestion, coughing, diarrhea, ear discharge, ear pain, headaches, neck pain, shortness of breath or vomiting. She has had no exposure to strep. She has tried nothing for the symptoms.       Past Medical History:   Diagnosis Date   • Generalized headaches    • Other ovarian cyst, right side    • Pelvic pain in female 10/02/2017    2/10   • Plantar fasciitis    • Tendonitis        Past Surgical History:   Procedure Laterality Date   • PELVISCOPY N/A 10/4/2017    Procedure: PELVISCOPY;  Surgeon: Eliza Carrizales M.D.;  Location: SURGERY [facility];  Service: Gynecology   • CARPAL TUNNEL RELEASE Right 01/2017   • LAPAROSCOPY  7/22/2011    Performed by ROSS VELAZQUEZ at SURGERY [facility]       Family History   Adopted: Yes       Allergies   Allergen Reactions   • Nkda [No Known Drug Allergy]    • Other Misc      \"Monocryl sutures\" part of incision came out and got drainage. Doctor said could be reason       Medications, Allergies, and current problem list reviewed today in Epic    Review of Systems   Constitutional: Positive for malaise/fatigue. Negative for chills and fever.   HENT: Positive for sore throat. Negative for congestion, ear discharge and ear pain.    Respiratory: Negative for cough, sputum production, shortness of breath and wheezing.    Cardiovascular: Negative for chest pain, " "palpitations and leg swelling.   Gastrointestinal: Negative for abdominal pain, diarrhea, nausea and vomiting.   Musculoskeletal: Positive for myalgias. Negative for neck pain.   Skin: Negative for rash.   Neurological: Negative for headaches.     All other systems reviewed and are negative.        Objective:     /76 (BP Location: Right arm, Patient Position: Sitting, BP Cuff Size: Adult)   Pulse 76   Temp 36.4 °C (97.6 °F) (Temporal)   Resp 18   Ht 1.702 m (5' 7\")   Wt 72.6 kg (160 lb)   SpO2 99%   BMI 25.06 kg/m²      Physical Exam  Constitutional:       General: She is not in acute distress.  HENT:      Head: Normocephalic and atraumatic.      Right Ear: External ear normal.      Left Ear: External ear normal.      Mouth/Throat:      Mouth: Mucous membranes are moist.      Pharynx: Uvula midline. Oropharyngeal exudate and posterior oropharyngeal erythema present. No uvula swelling.      Tonsils: Tonsillar exudate present. No tonsillar abscesses. Swellin+ on the right. 2+ on the left.   Eyes:      Conjunctiva/sclera: Conjunctivae normal.   Cardiovascular:      Rate and Rhythm: Normal rate and regular rhythm.      Heart sounds: No murmur. No friction rub. No gallop.    Pulmonary:      Effort: Pulmonary effort is normal. No respiratory distress.      Breath sounds: Normal breath sounds. No wheezing, rhonchi or rales.   Musculoskeletal: Normal range of motion.   Skin:     General: Skin is warm and dry.      Findings: No rash.   Neurological:      General: No focal deficit present.      Mental Status: She is alert and oriented to person, place, and time.   Psychiatric:         Mood and Affect: Mood normal.         Behavior: Behavior normal.         Thought Content: Thought content normal.         Judgment: Judgment normal.                 Assessment/Plan:     1. Exudative tonsillitis  POCT Rapid Strep A    POCT Mononucleosis (mono)    amoxicillin (AMOXIL) 875 MG tablet       poct strep- positive "   poct mono- negative      Current Outpatient Medications:   •  amoxicillin (AMOXIL) 875 MG tablet, Take 1 Tab by mouth 2 times a day for 10 days., Disp: 20 Tab, Rfl: 0  0     Differential diagnoses, Supportive care, and indications for immediate follow-up discussed with patient.   Instructed to return to clinic or nearest emergency department for any change in condition, further concerns, or worsening of symptoms.    The patient demonstrated a good understanding and agreed with the treatment plan.    Nicol Rodrigues P.A.-C.

## 2020-02-19 ENCOUNTER — HOSPITAL ENCOUNTER (OUTPATIENT)
Dept: LAB | Facility: MEDICAL CENTER | Age: 35
End: 2020-02-19
Attending: ORTHOPAEDIC SURGERY
Payer: COMMERCIAL

## 2020-02-19 LAB
BASOPHILS # BLD AUTO: 0.7 % (ref 0–1.8)
BASOPHILS # BLD: 0.05 K/UL (ref 0–0.12)
CRP SERPL HS-MCNC: 0.18 MG/DL (ref 0–0.75)
EOSINOPHIL # BLD AUTO: 0.08 K/UL (ref 0–0.51)
EOSINOPHIL NFR BLD: 1.1 % (ref 0–6.9)
ERYTHROCYTE [DISTWIDTH] IN BLOOD BY AUTOMATED COUNT: 42.3 FL (ref 35.9–50)
ERYTHROCYTE [SEDIMENTATION RATE] IN BLOOD BY WESTERGREN METHOD: 5 MM/HOUR (ref 0–20)
HCT VFR BLD AUTO: 45.1 % (ref 37–47)
HGB BLD-MCNC: 14.8 G/DL (ref 12–16)
IMM GRANULOCYTES # BLD AUTO: 0.04 K/UL (ref 0–0.11)
IMM GRANULOCYTES NFR BLD AUTO: 0.5 % (ref 0–0.9)
LYMPHOCYTES # BLD AUTO: 1.46 K/UL (ref 1–4.8)
LYMPHOCYTES NFR BLD: 20 % (ref 22–41)
MCH RBC QN AUTO: 30.5 PG (ref 27–33)
MCHC RBC AUTO-ENTMCNC: 32.8 G/DL (ref 33.6–35)
MCV RBC AUTO: 92.8 FL (ref 81.4–97.8)
MONOCYTES # BLD AUTO: 0.42 K/UL (ref 0–0.85)
MONOCYTES NFR BLD AUTO: 5.7 % (ref 0–13.4)
NEUTROPHILS # BLD AUTO: 5.26 K/UL (ref 2–7.15)
NEUTROPHILS NFR BLD: 72 % (ref 44–72)
NRBC # BLD AUTO: 0 K/UL
NRBC BLD-RTO: 0 /100 WBC
PLATELET # BLD AUTO: 248 K/UL (ref 164–446)
PMV BLD AUTO: 11 FL (ref 9–12.9)
RBC # BLD AUTO: 4.86 M/UL (ref 4.2–5.4)
WBC # BLD AUTO: 7.3 K/UL (ref 4.8–10.8)

## 2020-02-19 PROCEDURE — 36415 COLL VENOUS BLD VENIPUNCTURE: CPT

## 2020-02-19 PROCEDURE — 85652 RBC SED RATE AUTOMATED: CPT

## 2020-02-19 PROCEDURE — 86140 C-REACTIVE PROTEIN: CPT

## 2020-02-19 PROCEDURE — 85025 COMPLETE CBC W/AUTO DIFF WBC: CPT

## 2020-02-19 PROCEDURE — 86038 ANTINUCLEAR ANTIBODIES: CPT

## 2020-02-21 LAB — NUCLEAR IGG SER QL IA: NORMAL

## 2020-07-14 ENCOUNTER — HOSPITAL ENCOUNTER (OUTPATIENT)
Facility: MEDICAL CENTER | Age: 35
End: 2020-07-14
Attending: OBSTETRICS & GYNECOLOGY
Payer: COMMERCIAL

## 2020-07-14 LAB — B-HCG SERPL-ACNC: <1 MIU/ML (ref 0–5)

## 2020-07-14 PROCEDURE — 84702 CHORIONIC GONADOTROPIN TEST: CPT

## 2021-01-19 ENCOUNTER — OFFICE VISIT (OUTPATIENT)
Dept: URGENT CARE | Facility: PHYSICIAN GROUP | Age: 36
End: 2021-01-19
Payer: COMMERCIAL

## 2021-01-19 ENCOUNTER — HOSPITAL ENCOUNTER (OUTPATIENT)
Facility: MEDICAL CENTER | Age: 36
End: 2021-01-19
Attending: PHYSICIAN ASSISTANT
Payer: COMMERCIAL

## 2021-01-19 VITALS
HEIGHT: 67 IN | TEMPERATURE: 98 F | OXYGEN SATURATION: 99 % | BODY MASS INDEX: 27 KG/M2 | DIASTOLIC BLOOD PRESSURE: 70 MMHG | WEIGHT: 172 LBS | HEART RATE: 94 BPM | RESPIRATION RATE: 12 BRPM | SYSTOLIC BLOOD PRESSURE: 126 MMHG

## 2021-01-19 DIAGNOSIS — Z87.448 HISTORY OF PYELONEPHRITIS: ICD-10-CM

## 2021-01-19 DIAGNOSIS — R10.9 ACUTE LEFT FLANK PAIN: ICD-10-CM

## 2021-01-19 LAB
APPEARANCE UR: NORMAL
BILIRUB UR STRIP-MCNC: NEGATIVE MG/DL
COLOR UR AUTO: NORMAL
GLUCOSE UR STRIP.AUTO-MCNC: NEGATIVE MG/DL
KETONES UR STRIP.AUTO-MCNC: NEGATIVE MG/DL
LEUKOCYTE ESTERASE UR QL STRIP.AUTO: NEGATIVE
NITRITE UR QL STRIP.AUTO: NEGATIVE
PH UR STRIP.AUTO: 5.5 [PH] (ref 5–8)
PROT UR QL STRIP: NEGATIVE MG/DL
RBC UR QL AUTO: NEGATIVE
SP GR UR STRIP.AUTO: 1.03
UROBILINOGEN UR STRIP-MCNC: 0.2 MG/DL

## 2021-01-19 PROCEDURE — 87086 URINE CULTURE/COLONY COUNT: CPT

## 2021-01-19 PROCEDURE — 81002 URINALYSIS NONAUTO W/O SCOPE: CPT | Performed by: PHYSICIAN ASSISTANT

## 2021-01-19 PROCEDURE — 99213 OFFICE O/P EST LOW 20 MIN: CPT | Performed by: PHYSICIAN ASSISTANT

## 2021-01-19 RX ORDER — GABAPENTIN 100 MG/1
CAPSULE ORAL
COMMUNITY
Start: 2021-01-05 | End: 2021-03-13

## 2021-01-19 RX ORDER — METHOCARBAMOL 750 MG/1
TABLET, FILM COATED ORAL 4 TIMES DAILY PRN
COMMUNITY
Start: 2020-12-17 | End: 2021-01-27

## 2021-01-19 RX ORDER — CELECOXIB 200 MG/1
200 CAPSULE ORAL
COMMUNITY
Start: 2021-01-05 | End: 2021-03-13

## 2021-01-19 ASSESSMENT — FIBROSIS 4 INDEX: FIB4 SCORE: 0.6

## 2021-01-19 ASSESSMENT — PAIN SCALES - GENERAL: PAINLEVEL: 7=MODERATE-SEVERE PAIN

## 2021-01-19 ASSESSMENT — ENCOUNTER SYMPTOMS: BACK PAIN: 1

## 2021-01-20 ENCOUNTER — APPOINTMENT (OUTPATIENT)
Dept: RADIOLOGY | Facility: MEDICAL CENTER | Age: 36
End: 2021-01-20
Attending: EMERGENCY MEDICINE
Payer: COMMERCIAL

## 2021-01-20 ENCOUNTER — HOSPITAL ENCOUNTER (EMERGENCY)
Facility: MEDICAL CENTER | Age: 36
End: 2021-01-20
Attending: EMERGENCY MEDICINE
Payer: COMMERCIAL

## 2021-01-20 VITALS
TEMPERATURE: 97.6 F | BODY MASS INDEX: 27.23 KG/M2 | HEIGHT: 67 IN | SYSTOLIC BLOOD PRESSURE: 106 MMHG | HEART RATE: 67 BPM | WEIGHT: 173.5 LBS | RESPIRATION RATE: 18 BRPM | OXYGEN SATURATION: 98 % | DIASTOLIC BLOOD PRESSURE: 69 MMHG

## 2021-01-20 DIAGNOSIS — R10.11 RIGHT UPPER QUADRANT PAIN: ICD-10-CM

## 2021-01-20 DIAGNOSIS — R10.9 ACUTE LEFT FLANK PAIN: ICD-10-CM

## 2021-01-20 DIAGNOSIS — R10.9 LEFT FLANK PAIN: ICD-10-CM

## 2021-01-20 DIAGNOSIS — Z87.448 HISTORY OF PYELONEPHRITIS: ICD-10-CM

## 2021-01-20 LAB
ALBUMIN SERPL BCP-MCNC: 4.3 G/DL (ref 3.2–4.9)
ALBUMIN/GLOB SERPL: 1.7 G/DL
ALP SERPL-CCNC: 106 U/L (ref 30–99)
ALT SERPL-CCNC: 12 U/L (ref 2–50)
ANION GAP SERPL CALC-SCNC: 8 MMOL/L (ref 7–16)
APPEARANCE UR: CLEAR
AST SERPL-CCNC: 15 U/L (ref 12–45)
BASOPHILS # BLD AUTO: 0.6 % (ref 0–1.8)
BASOPHILS # BLD: 0.04 K/UL (ref 0–0.12)
BILIRUB SERPL-MCNC: 0.2 MG/DL (ref 0.1–1.5)
BILIRUB UR QL STRIP.AUTO: NEGATIVE
BUN SERPL-MCNC: 18 MG/DL (ref 8–22)
CALCIUM SERPL-MCNC: 9 MG/DL (ref 8.4–10.2)
CHLORIDE SERPL-SCNC: 109 MMOL/L (ref 96–112)
CO2 SERPL-SCNC: 24 MMOL/L (ref 20–33)
COLOR UR: YELLOW
CREAT SERPL-MCNC: 0.79 MG/DL (ref 0.5–1.4)
EOSINOPHIL # BLD AUTO: 0.12 K/UL (ref 0–0.51)
EOSINOPHIL NFR BLD: 1.7 % (ref 0–6.9)
ERYTHROCYTE [DISTWIDTH] IN BLOOD BY AUTOMATED COUNT: 42 FL (ref 35.9–50)
GLOBULIN SER CALC-MCNC: 2.6 G/DL (ref 1.9–3.5)
GLUCOSE SERPL-MCNC: 69 MG/DL (ref 65–99)
GLUCOSE UR STRIP.AUTO-MCNC: NEGATIVE MG/DL
HCG SERPL QL: NEGATIVE
HCT VFR BLD AUTO: 40.9 % (ref 37–47)
HGB BLD-MCNC: 13.6 G/DL (ref 12–16)
IMM GRANULOCYTES # BLD AUTO: 0.02 K/UL (ref 0–0.11)
IMM GRANULOCYTES NFR BLD AUTO: 0.3 % (ref 0–0.9)
KETONES UR STRIP.AUTO-MCNC: NEGATIVE MG/DL
LEUKOCYTE ESTERASE UR QL STRIP.AUTO: NEGATIVE
LIPASE SERPL-CCNC: 27 U/L (ref 7–58)
LYMPHOCYTES # BLD AUTO: 2.23 K/UL (ref 1–4.8)
LYMPHOCYTES NFR BLD: 32.3 % (ref 22–41)
MCH RBC QN AUTO: 30.5 PG (ref 27–33)
MCHC RBC AUTO-ENTMCNC: 33.3 G/DL (ref 33.6–35)
MCV RBC AUTO: 91.7 FL (ref 81.4–97.8)
MICRO URNS: NORMAL
MONOCYTES # BLD AUTO: 0.54 K/UL (ref 0–0.85)
MONOCYTES NFR BLD AUTO: 7.8 % (ref 0–13.4)
NEUTROPHILS # BLD AUTO: 3.96 K/UL (ref 2–7.15)
NEUTROPHILS NFR BLD: 57.3 % (ref 44–72)
NITRITE UR QL STRIP.AUTO: NEGATIVE
NRBC # BLD AUTO: 0 K/UL
NRBC BLD-RTO: 0 /100 WBC
PH UR STRIP.AUTO: 6 [PH] (ref 5–8)
PLATELET # BLD AUTO: 219 K/UL (ref 164–446)
PMV BLD AUTO: 10.2 FL (ref 9–12.9)
POTASSIUM SERPL-SCNC: 3.9 MMOL/L (ref 3.6–5.5)
PROT SERPL-MCNC: 6.9 G/DL (ref 6–8.2)
PROT UR QL STRIP: NEGATIVE MG/DL
RBC # BLD AUTO: 4.46 M/UL (ref 4.2–5.4)
RBC UR QL AUTO: NEGATIVE
SODIUM SERPL-SCNC: 141 MMOL/L (ref 135–145)
SP GR UR STRIP.AUTO: 1.02
WBC # BLD AUTO: 6.9 K/UL (ref 4.8–10.8)

## 2021-01-20 PROCEDURE — 96375 TX/PRO/DX INJ NEW DRUG ADDON: CPT

## 2021-01-20 PROCEDURE — 84703 CHORIONIC GONADOTROPIN ASSAY: CPT

## 2021-01-20 PROCEDURE — A9270 NON-COVERED ITEM OR SERVICE: HCPCS | Performed by: EMERGENCY MEDICINE

## 2021-01-20 PROCEDURE — 36415 COLL VENOUS BLD VENIPUNCTURE: CPT

## 2021-01-20 PROCEDURE — 85025 COMPLETE CBC W/AUTO DIFF WBC: CPT

## 2021-01-20 PROCEDURE — 700102 HCHG RX REV CODE 250 W/ 637 OVERRIDE(OP): Performed by: EMERGENCY MEDICINE

## 2021-01-20 PROCEDURE — 80053 COMPREHEN METABOLIC PANEL: CPT

## 2021-01-20 PROCEDURE — 81003 URINALYSIS AUTO W/O SCOPE: CPT

## 2021-01-20 PROCEDURE — 700111 HCHG RX REV CODE 636 W/ 250 OVERRIDE (IP): Performed by: EMERGENCY MEDICINE

## 2021-01-20 PROCEDURE — 83690 ASSAY OF LIPASE: CPT

## 2021-01-20 PROCEDURE — 99284 EMERGENCY DEPT VISIT MOD MDM: CPT

## 2021-01-20 PROCEDURE — 74176 CT ABD & PELVIS W/O CONTRAST: CPT

## 2021-01-20 PROCEDURE — 96374 THER/PROPH/DIAG INJ IV PUSH: CPT

## 2021-01-20 RX ORDER — ONDANSETRON 2 MG/ML
4 INJECTION INTRAMUSCULAR; INTRAVENOUS ONCE
Status: COMPLETED | OUTPATIENT
Start: 2021-01-20 | End: 2021-01-20

## 2021-01-20 RX ORDER — KETOROLAC TROMETHAMINE 30 MG/ML
60 INJECTION, SOLUTION INTRAMUSCULAR; INTRAVENOUS ONCE
Status: DISCONTINUED | OUTPATIENT
Start: 2021-01-20 | End: 2021-01-20

## 2021-01-20 RX ORDER — KETOROLAC TROMETHAMINE 30 MG/ML
30 INJECTION, SOLUTION INTRAMUSCULAR; INTRAVENOUS ONCE
Status: COMPLETED | OUTPATIENT
Start: 2021-01-20 | End: 2021-01-20

## 2021-01-20 RX ADMIN — MAGNESIUM HYDROXIDE 30 ML: 400 SUSPENSION ORAL at 20:13

## 2021-01-20 RX ADMIN — KETOROLAC TROMETHAMINE 30 MG: 30 INJECTION, SOLUTION INTRAMUSCULAR at 19:18

## 2021-01-20 RX ADMIN — ONDANSETRON 4 MG: 2 INJECTION INTRAMUSCULAR; INTRAVENOUS at 19:14

## 2021-01-20 ASSESSMENT — FIBROSIS 4 INDEX: FIB4 SCORE: 0.6

## 2021-01-20 NOTE — PROGRESS NOTES
Subjective:      Mckenzie Cash is a 35 y.o. female who presents with Back Pain (middle of back pain, sharp and dull pain,)    Medications:    • celecoxib Caps  • gabapentin Caps  • ibuprofen Tabs  • Levocetirizine Dihydrochloride Tabs  • levonorgestrel  • methocarbamol Tabs  • montelukast Tabs  • ondansetron Tbdp    Allergies: Nkda [no known drug allergy] and Other misc    Problem List: Mckenzie Cash has Not immune to rubella and Complex cyst of right ovary on their problem list.    Surgical History:  Past Surgical History:   Procedure Laterality Date   • PELVISCOPY N/A 10/4/2017    Procedure: PELVISCOPY;  Surgeon: Eliza Carrizales M.D.;  Location: SURGERY [facility];  Service: Gynecology   • CARPAL TUNNEL RELEASE Right 01/2017   • LAPAROSCOPY  7/22/2011    Performed by ROSS VELAZQUEZ at SURGERY [facility]       Past Social Hx: Mckenzie Cash  reports that she has never smoked. She has never used smokeless tobacco. She reports previous alcohol use. She reports that she does not use drugs.     Past Family Hx:  Mckenzie Cash family history is not on file. She was adopted.     Problem list, medications, and allergies reviewed by myself today in Epic.             Patient presents with:  Back Pain: middle of back pain, sharp and dull pain.  Pt has history of pyelonephritis without dysuria, only back pain, so would like to have a urine test.  Pt denies fever, chills, n/v/d.          Back Pain  This is a new problem. The current episode started in the past 7 days. The problem occurs intermittently. The problem has been gradually worsening since onset. The pain is present in the lumbar spine. The quality of the pain is described as aching, shooting and cramping. The pain does not radiate. The pain is at a severity of 5/10. The pain is moderate. The symptoms are aggravated by position. Pertinent negatives include no abdominal pain, bladder incontinence, bowel  "incontinence, dysuria, fever, paresthesias or pelvic pain. She has tried NSAIDs for the symptoms. The treatment provided no relief.       Review of Systems   Constitutional: Negative for chills and fever.   Gastrointestinal: Negative for abdominal pain, blood in stool, bowel incontinence, constipation, diarrhea, nausea and vomiting.   Genitourinary: Positive for flank pain. Negative for bladder incontinence, dysuria, frequency, hematuria, pelvic pain and urgency.   Musculoskeletal: Positive for back pain.   Neurological: Negative for paresthesias.   All other systems reviewed and are negative.         Objective:     /70   Pulse 94   Temp 36.7 °C (98 °F)   Resp 12   Ht 1.702 m (5' 7\")   Wt 78 kg (172 lb)   SpO2 99%   BMI 26.94 kg/m²      Physical Exam  Vitals signs and nursing note reviewed.   Constitutional:       General: She is not in acute distress.     Appearance: Normal appearance. She is well-developed and normal weight. She is not ill-appearing or toxic-appearing.   HENT:      Head: Normocephalic and atraumatic.      Nose: Nose normal.      Mouth/Throat:      Mouth: Mucous membranes are moist.   Eyes:      Extraocular Movements: Extraocular movements intact.      Conjunctiva/sclera: Conjunctivae normal.      Pupils: Pupils are equal, round, and reactive to light.   Neck:      Musculoskeletal: Normal range of motion and neck supple.   Cardiovascular:      Rate and Rhythm: Normal rate and regular rhythm.      Pulses: Normal pulses.      Heart sounds: Normal heart sounds.   Pulmonary:      Effort: Pulmonary effort is normal.      Breath sounds: Normal breath sounds.   Abdominal:      General: Bowel sounds are normal.      Palpations: Abdomen is soft.      Tenderness: There is no abdominal tenderness. There is left CVA tenderness. There is no guarding or rebound.   Musculoskeletal: Normal range of motion.   Skin:     General: Skin is warm and dry.      Capillary Refill: Capillary refill takes less " than 2 seconds.   Neurological:      General: No focal deficit present.      Mental Status: She is alert and oriented to person, place, and time.      Gait: Gait normal.   Psychiatric:         Mood and Affect: Mood normal.         Behavior: Behavior is cooperative.                 Lab Results   Component Value Date/Time    POCCOLOR Sarahi 01/19/2021 06:19 PM    POCAPPEAR Cloudy 01/19/2021 06:19 PM    POCLEUKEST Negative 01/19/2021 06:19 PM    POCNITRITE Negative 01/19/2021 06:19 PM    POCUROBILIGE 0.2 01/19/2021 06:19 PM    POCPROTEIN Negative 01/19/2021 06:19 PM    POCURPH 5.5 01/19/2021 06:19 PM    POCBLOOD Negative 01/19/2021 06:19 PM    POCSPGRV 1.030 01/19/2021 06:19 PM    POCKETONES Negative 01/19/2021 06:19 PM    POCBILIRUBIN Negative 01/19/2021 06:19 PM    POCGLUCUA Negative 01/19/2021 06:19 PM      Declined Preg test  Assessment/Plan:         1. Acute left flank pain  POCT Urinalysis    URINE CULTURE(NEW)   2. History of pyelonephritis  POCT Urinalysis    URINE CULTURE(NEW)     Patient was evaluated in clinic today while wearing appropriate personal protective equipment.      Culture sent to lab, will call with any necessary treatment or treatment changes.     OTC: motrin 600mg TID for pain.     PT should follow up with PCP in 1-2 days for re-evaluation if symptoms have not improved.      Discussed red flags and reasons to return to UC or ED.      Pt and/or family verbalized understanding of diagnosis and follow up instructions and was offered informational handout on diagnosis.  PT discharged.

## 2021-01-21 NOTE — ED NOTES
Pt given PO med per order, taken well   Was re-evaluated by MD and she is now cleared for d/c  dischg instructions given to pt  Verbally understands  D/c'ed to home in NAD   Aware to f/u w/ PCP as needed

## 2021-01-21 NOTE — DISCHARGE INSTRUCTIONS
I find no specific cause for your abdominal pain.  There is no signs of infection,, there is no signs of obstruction.  You are stable for discharge home.  Use Motrin Tylenol for discomfort and follow-up with your primary physician.

## 2021-01-21 NOTE — ED PROVIDER NOTES
ED Provider Note    ED Provider    Means of arrival: Hospitalist  History obtained from: Patient  History limited by: None    CHIEF COMPLAINT  Chief Complaint   Patient presents with   • Flank Pain     Started on Left side and now bilateral flank pain and wrapping around to R abdomen   • Nausea       HPI  Mckenzie Cash is a 35 y.o. female who presents with complaints of a left flank and that started yesterday.  The pain was a constant, sharp, was severe when it started, and is waxing and waning since that time.  She went to the urgent care and a urine dip was done was negative.  She had no hematuria no dysuria.  She never had kidney stones but she has had kidney infection before.  Then today she developed a right upper quadrant pain.  Again the pain is sharp, nonradiating, it has been waxing and waning.  She has had nausea through the day, no vomiting.  No diarrhea.    She has history of endometriosis and had surgery for that but usually her pain is in the lower pelvic area.    No recent fevers chills sweats cough congestion or other flulike symptoms    REVIEW OF SYSTEMS  See HPI for further details. All other systems are negative.     PAST MEDICAL HISTORY   has a past medical history of Generalized headaches, Other ovarian cyst, right side, Pelvic pain in female (10/02/2017), Plantar fasciitis, and Tendonitis.    SOCIAL HISTORY  Social History     Tobacco Use   • Smoking status: Never Smoker   • Smokeless tobacco: Never Used   Substance and Sexual Activity   • Alcohol use: Not Currently     Comment: Rarely    • Drug use: No   • Sexual activity: Not on file       SURGICAL HISTORY   has a past surgical history that includes laparoscopy (7/22/2011); carpal tunnel release (Right, 01/2017); and pelviscopy (N/A, 10/4/2017).    CURRENT MEDICATIONS  Home Medications    **Home medications have not yet been reviewed for this encounter**         ALLERGIES  Allergies   Allergen Reactions   • Nkda [No Known Drug  "Allergy]    • Other Misc      \"Monocryl sutures\" part of incision came out and got drainage. Doctor said could be reason       PHYSICAL EXAM  VITAL SIGNS: /69   Pulse 67   Temp 36.4 °C (97.6 °F) (Temporal)   Resp 18   Ht 1.702 m (5' 7\")   Wt 78.7 kg (173 lb 8 oz)   SpO2 98%   BMI 27.17 kg/m²   Constitutional: Alert in no apparent distress.  HENT: No signs of trauma, Mucous membranes are moist   Eyes:  Conjunctiva normal, Non-icteric.   Neck: Normal range of motion, No tenderness, Supple,  Lymphatic: No lymphadenopathy noted.   Cardiovascular: Regular rate and rhythm, no murmurs.   Thorax & Lungs: Normal breath sounds, No respiratory distress, No wheezing, No chest tenderness.   Abdomen: Bowel sounds normal, Soft, mild diffuse tenderness, no guarding rigidity or rebound.  There is minimal left flank tenderness, No masses, No pulsatile masses. No peritoneal signs.  Skin: Warm, Dry,Normal color  Back: No bony tenderness, No CVA tenderness.   Extremities:No edema, No tenderness, No cyanosis,    Musculoskeletal: Good range of motion in all major joints. No tenderness to palpation or major deformities noted.   Neurologic: Alert ,Oriented x4, Normal motor function, Normal sensory function, No focal deficits noted.   Psychiatric: Affect normal, Judgment normal, Mood normal.       MEDICAL DECISION MAKING  This is a 35 y.o. female who presents with symptoms as described above.  I cannot find a cause to cause pain in both areas however there is concerns for kidney stone kidney infection.  Urinalysis will be done and CT be done for evaluation of this.  With right upper quadrant pain there is concerns for gallbladder disease, cholecystitis cholelithiasis and a lipase to be done to evaluate for pancreatitis labs were done to evaluate for cholestasis and CT will be done for the flank pain and this pain also determine any concerns for biliary obstruction    DIAGNOSTIC STUDIES / PROCEDURES    EKG      LABS  Results for " orders placed or performed during the hospital encounter of 01/20/21   CBC WITH DIFFERENTIAL   Result Value Ref Range    WBC 6.9 4.8 - 10.8 K/uL    RBC 4.46 4.20 - 5.40 M/uL    Hemoglobin 13.6 12.0 - 16.0 g/dL    Hematocrit 40.9 37.0 - 47.0 %    MCV 91.7 81.4 - 97.8 fL    MCH 30.5 27.0 - 33.0 pg    MCHC 33.3 (L) 33.6 - 35.0 g/dL    RDW 42.0 35.9 - 50.0 fL    Platelet Count 219 164 - 446 K/uL    MPV 10.2 9.0 - 12.9 fL    Neutrophils-Polys 57.30 44.00 - 72.00 %    Lymphocytes 32.30 22.00 - 41.00 %    Monocytes 7.80 0.00 - 13.40 %    Eosinophils 1.70 0.00 - 6.90 %    Basophils 0.60 0.00 - 1.80 %    Immature Granulocytes 0.30 0.00 - 0.90 %    Nucleated RBC 0.00 /100 WBC    Neutrophils (Absolute) 3.96 2.00 - 7.15 K/uL    Lymphs (Absolute) 2.23 1.00 - 4.80 K/uL    Monos (Absolute) 0.54 0.00 - 0.85 K/uL    Eos (Absolute) 0.12 0.00 - 0.51 K/uL    Baso (Absolute) 0.04 0.00 - 0.12 K/uL    Immature Granulocytes (abs) 0.02 0.00 - 0.11 K/uL    NRBC (Absolute) 0.00 K/uL   COMP METABOLIC PANEL   Result Value Ref Range    Sodium 141 135 - 145 mmol/L    Potassium 3.9 3.6 - 5.5 mmol/L    Chloride 109 96 - 112 mmol/L    Co2 24 20 - 33 mmol/L    Anion Gap 8.0 7.0 - 16.0    Glucose 69 65 - 99 mg/dL    Bun 18 8 - 22 mg/dL    Creatinine 0.79 0.50 - 1.40 mg/dL    Calcium 9.0 8.4 - 10.2 mg/dL    AST(SGOT) 15 12 - 45 U/L    ALT(SGPT) 12 2 - 50 U/L    Alkaline Phosphatase 106 (H) 30 - 99 U/L    Total Bilirubin 0.2 0.1 - 1.5 mg/dL    Albumin 4.3 3.2 - 4.9 g/dL    Total Protein 6.9 6.0 - 8.2 g/dL    Globulin 2.6 1.9 - 3.5 g/dL    A-G Ratio 1.7 g/dL   LIPASE   Result Value Ref Range    Lipase 27 7 - 58 U/L   HCG QUAL SERUM   Result Value Ref Range    Beta-Hcg Qualitative Serum Negative Negative   URINALYSIS,CULTURE IF INDICATED    Specimen: Urine, Clean Catch   Result Value Ref Range    Color Yellow     Character Clear     Specific Gravity 1.025 <1.035    Ph 6.0 5.0 - 8.0    Glucose Negative Negative mg/dL    Ketones Negative Negative mg/dL     Protein Negative Negative mg/dL    Bilirubin Negative Negative    Nitrite Negative Negative    Leukocyte Esterase Negative Negative    Occult Blood Negative Negative    Micro Urine Req see below    ESTIMATED GFR   Result Value Ref Range    GFR If African American >60 >60 mL/min/1.73 m 2    GFR If Non African American >60 >60 mL/min/1.73 m 2         RADIOLOGY  CT-RENAL COLIC EVALUATION(A/P W/O)   Final Result         1. No urinary tract calculus identified. No renal collecting system in dilatation.      2. No evidence of inflammatory change in the abdomen or pelvis.  The study is however limited due to nonuse of intravenous contrast      CT scan was independently visualized by me    COURSE  Pertinent Labs & Imaging studies reviewed. (See chart for details)    6:37 PM - Patient seen and examined at bedside. Discussed plan of care,    Who presents with a left flank pain and right upper quadrant pain as stated before do not know a cause for each of these as a combination.  On evaluation her urinalysis shows no infection.  Her lab test shows no concern for cholestasis and his CT shows no obstructive or inflammatory processes.    On evaluation there is moderate stool on the right side of the colon, this may be part of the etiology of her discomfort so she is given a laxative.  I did explain to her that this may or may not improve her discomfort.    At this time there is no emergent cause for her abdominal pain being found.  She will be treated with Motrin Tylenol at home and return if her symptoms change or worsen.      Discharged home in stable condition    FINAL IMPRESSION  1. Left flank pain    2. Right upper quadrant pain        The note accurately reflects work and decisions made by me.  Pardeep Chapa D.O.  1/20/2021  9:07 PM

## 2021-01-22 LAB
BACTERIA UR CULT: NORMAL
SIGNIFICANT IND 70042: NORMAL
SITE SITE: NORMAL
SOURCE SOURCE: NORMAL

## 2021-01-25 ENCOUNTER — OFFICE VISIT (OUTPATIENT)
Dept: URGENT CARE | Facility: PHYSICIAN GROUP | Age: 36
End: 2021-01-25
Payer: COMMERCIAL

## 2021-01-25 VITALS
DIASTOLIC BLOOD PRESSURE: 72 MMHG | SYSTOLIC BLOOD PRESSURE: 114 MMHG | TEMPERATURE: 99.3 F | WEIGHT: 172 LBS | OXYGEN SATURATION: 98 % | HEART RATE: 102 BPM | RESPIRATION RATE: 14 BRPM | HEIGHT: 67 IN | BODY MASS INDEX: 27 KG/M2

## 2021-01-25 DIAGNOSIS — J02.0 STREP THROAT: ICD-10-CM

## 2021-01-25 DIAGNOSIS — J02.9 PHARYNGITIS, UNSPECIFIED ETIOLOGY: ICD-10-CM

## 2021-01-25 PROCEDURE — 99213 OFFICE O/P EST LOW 20 MIN: CPT | Performed by: NURSE PRACTITIONER

## 2021-01-25 RX ORDER — AMOXICILLIN 500 MG/1
500 CAPSULE ORAL 2 TIMES DAILY
Qty: 20 CAP | Refills: 0 | Status: SHIPPED | OUTPATIENT
Start: 2021-01-25 | End: 2021-01-27

## 2021-01-25 ASSESSMENT — ENCOUNTER SYMPTOMS
BLOOD IN STOOL: 0
DIARRHEA: 0
NAUSEA: 0
VOMITING: 0
PARESTHESIAS: 0
FEVER: 0
CONSTIPATION: 0
ABDOMINAL PAIN: 0
BOWEL INCONTINENCE: 0
CHILLS: 0
FLANK PAIN: 1

## 2021-01-25 ASSESSMENT — FIBROSIS 4 INDEX: FIB4 SCORE: 0.65

## 2021-01-25 NOTE — LETTER
January 25, 2021         Patient: Mckenzie Cash   YOB: 1985   Date of Visit: 1/25/2021           To Whom it May Concern:    Mckenzie Cash was seen in my clinic on 1/25/2021. She may return to work on 01/27/2021  If you have any questions or concerns, please don't hesitate to call.        Sincerely,           COOPER Scales.  Electronically Signed

## 2021-01-26 ASSESSMENT — ENCOUNTER SYMPTOMS
HEADACHES: 1
NAUSEA: 0
FEVER: 0
SORE THROAT: 1
CHILLS: 0
ABDOMINAL PAIN: 0
VOMITING: 0
MYALGIAS: 0

## 2021-01-26 NOTE — PROGRESS NOTES
"Subjective:   Mckenzie Cash is a 35 y.o. female who presents for Pharyngitis (Headache and sore throat since this morning.)      HPI  35-year-old female patient in urgent care for new onset symptoms x1 day.  Patient states she woke up this morning with headache and sore throat. Has history of strep throat and states this feels very similar.  Denies fever, chills, nausea, vomiting admits to headache denies rash or body aches.  Has not taken anything over-the-counter for symptoms.      Review of Systems   Constitutional: Negative for chills and fever.   HENT: Positive for sore throat.    Gastrointestinal: Negative for abdominal pain, nausea and vomiting.   Musculoskeletal: Negative for myalgias.   Skin: Negative for rash.   Neurological: Positive for headaches.       Patient Active Problem List   Diagnosis   • Not immune to rubella   • Complex cyst of right ovary     Past Surgical History:   Procedure Laterality Date   • PELVISCOPY N/A 10/4/2017    Procedure: PELVISCOPY;  Surgeon: Eliza Carrizales M.D.;  Location: SURGERY [facility];  Service: Gynecology   • CARPAL TUNNEL RELEASE Right 01/2017   • LAPAROSCOPY  7/22/2011    Performed by ROSS VELAZQUEZ at SURGERY [facility]     Social History     Tobacco Use   • Smoking status: Never Smoker   • Smokeless tobacco: Never Used   Substance Use Topics   • Alcohol use: Not Currently     Comment: Rarely    • Drug use: No      Family History   Adopted: Yes      (Allergies, Medications, & Tobacco/Substance Use were reconciled by the Medical Assistant and reviewed by myself. The family history is prepopulated)     Objective:     /72   Pulse (!) 102   Temp 37.4 °C (99.3 °F)   Resp 14   Ht 1.702 m (5' 7\")   Wt 78 kg (172 lb)   SpO2 98%   BMI 26.94 kg/m²     Physical Exam  Vitals signs reviewed.   Constitutional:       Appearance: Normal appearance.   HENT:      Right Ear: Tympanic membrane, ear canal and external ear normal.      Left " Ear: Tympanic membrane, ear canal and external ear normal.      Nose: Nose normal.      Mouth/Throat:      Lips: Pink.      Mouth: Mucous membranes are moist.      Pharynx: Uvula midline. Posterior oropharyngeal erythema present.      Tonsils: 2+ on the right. 2+ on the left.   Cardiovascular:      Rate and Rhythm: Normal rate and regular rhythm.      Heart sounds: Normal heart sounds.   Pulmonary:      Effort: Pulmonary effort is normal.      Breath sounds: Normal breath sounds.   Lymphadenopathy:      Cervical: Cervical adenopathy present.   Skin:     General: Skin is warm.   Neurological:      Mental Status: She is alert and oriented to person, place, and time.   Psychiatric:         Mood and Affect: Mood normal.         Behavior: Behavior normal.         Thought Content: Thought content normal.         Judgment: Judgment normal.         Assessment/Plan:     1. Strep throat  amoxicillin (AMOXIL) 500 MG Cap    POCT Rapid Strep A   2. Pharyngitis, unspecified etiology  POCT Rapid Strep A     Discussed physical examination findings with patient presentation, length patient symptoms and positive strep and clinical be treated with antibiotics.  Advised to finish all antibiotics as prescribed.  Discussed supportive care including increase fluids using electrolyte enriched beverages over-the-counter NSAIDs and Tylenol per 's instructions, salt water gargles, throat lozenges and throat sprays.  Advised patient to switch out toothbrush in 48 hours.    Work note provided    Differential diagnosis, natural history, supportive care, and indications for immediate follow-up discussed.    Advised the patient to follow-up with the primary care physician for recheck, reevaluation, and consideration of further management.    Please note that this dictation was created using voice recognition software. I have made a reasonable attempt to correct obvious errors, but I expect that there are errors of grammar and possibly  content that I did not discover before finalizing the note.    This note was electronically signed BAILEY Ochoa

## 2021-01-27 ENCOUNTER — OFFICE VISIT (OUTPATIENT)
Dept: URGENT CARE | Facility: PHYSICIAN GROUP | Age: 36
End: 2021-01-27
Payer: COMMERCIAL

## 2021-01-27 VITALS
HEIGHT: 67 IN | OXYGEN SATURATION: 96 % | BODY MASS INDEX: 26.68 KG/M2 | SYSTOLIC BLOOD PRESSURE: 98 MMHG | WEIGHT: 170 LBS | HEART RATE: 100 BPM | TEMPERATURE: 98.2 F | RESPIRATION RATE: 14 BRPM | DIASTOLIC BLOOD PRESSURE: 68 MMHG

## 2021-01-27 DIAGNOSIS — J02.9 PHARYNGITIS, UNSPECIFIED ETIOLOGY: Primary | ICD-10-CM

## 2021-01-27 PROCEDURE — 99213 OFFICE O/P EST LOW 20 MIN: CPT | Performed by: PHYSICIAN ASSISTANT

## 2021-01-27 RX ORDER — AMOXICILLIN AND CLAVULANATE POTASSIUM 875; 125 MG/1; MG/1
1 TABLET, FILM COATED ORAL 2 TIMES DAILY
Qty: 14 TAB | Refills: 0 | Status: SHIPPED | OUTPATIENT
Start: 2021-01-27 | End: 2021-02-03

## 2021-01-27 ASSESSMENT — ENCOUNTER SYMPTOMS
VOMITING: 0
COUGH: 0
SORE THROAT: 1
CHILLS: 0
ABDOMINAL PAIN: 0
FEVER: 0
SHORTNESS OF BREATH: 0
CONSTIPATION: 0
NAUSEA: 0
DIARRHEA: 0

## 2021-01-27 ASSESSMENT — FIBROSIS 4 INDEX: FIB4 SCORE: 0.65

## 2021-01-27 NOTE — LETTER
January 27, 2021         Patient: Mckenzie Cash   YOB: 1985   Date of Visit: 1/27/2021           To Whom it May Concern:    Mckenzie Cash was seen in my clinic on 1/27/2021. She may return to work on 1/30/21.    If you have any questions or concerns, please don't hesitate to call.        Sincerely,           Sophie Aguilera P.A.-C.  Electronically Signed

## 2021-01-27 NOTE — PROGRESS NOTES
Subjective:   Mckenzie Cash is a 35 y.o. female who presents for Pharyngitis (sx started with the body aches, chills, lack of sleep, pt states she is still having a sore throat. Pt states she gets strep frequently and she thinks the abx aren't strong enough. )        HPI   The patient was seen on 1/25/2021 and treated for strep pharyngitis.  She has been taking amoxicillin twice a day without relief.  She states her symptoms have worsened.  She is now experiencing body aches, insomnia, chills.  She has had a similar episode in the past requiring Augmentin.  She does note extensive use of amoxicillin in the past for frequent pharyngitis infections.      Review of Systems   Constitutional: Negative for chills, fever and malaise/fatigue.   HENT: Positive for sore throat. Negative for congestion and ear pain.    Respiratory: Negative for cough and shortness of breath.    Gastrointestinal: Negative for abdominal pain, constipation, diarrhea, nausea and vomiting.   All other systems reviewed and are negative.      PMH:  has a past medical history of Generalized headaches, Other ovarian cyst, right side, Pelvic pain in female (10/02/2017), Plantar fasciitis, and Tendonitis.  MEDS:   Current Outpatient Medications:   •  amoxicillin-clavulanate (AUGMENTIN) 875-125 MG Tab, Take 1 Tab by mouth 2 times a day for 7 days., Disp: 14 Tab, Rfl: 0  •  Levocetirizine Dihydrochloride (XYZAL) 5 MG Tab, Take 5 mg by mouth 2 times a day as needed., Disp: , Rfl:   •  ibuprofen (MOTRIN) 200 MG Tab, Take 600 mg by mouth every 8 hours as needed., Disp: , Rfl:   •  levonorgestrel (MIRENA, 52 MG,) 20 MCG/24HR IUD, 1 Each by Intrauterine route Once., Disp: , Rfl:   •  gabapentin (NEURONTIN) 100 MG Cap, TAKE 1 CAPSULE BY MOUTH THREE TIMES DAILY FOR 3 DAYS THEN 2 THREE TIMES DAILY FOR 3 DAYS THEN 3 THREE TIMES DAILY, Disp: , Rfl:   •  celecoxib (CELEBREX) 200 MG Cap, Take 200 mg by mouth., Disp: , Rfl:   ALLERGIES:   Allergies  "  Allergen Reactions   • Nkda [No Known Drug Allergy]    • Other Misc      \"Monocryl sutures\" part of incision came out and got drainage. Doctor said could be reason     SURGHX:   Past Surgical History:   Procedure Laterality Date   • PELVISCOPY N/A 10/4/2017    Procedure: PELVISCOPY;  Surgeon: Eliza Carrizales M.D.;  Location: SURGERY [facility];  Service: Gynecology   • CARPAL TUNNEL RELEASE Right 01/2017   • LAPAROSCOPY  7/22/2011    Performed by ROSS VELAZQUEZ at SURGERY [facility]     SOCHX:  reports that she has never smoked. She has never used smokeless tobacco. She reports previous alcohol use. She reports that she does not use drugs.  Family History   Adopted: Yes        Objective:   BP (!) 98/68 (BP Location: Right arm, Patient Position: Sitting, BP Cuff Size: Adult)   Pulse 100   Temp 36.8 °C (98.2 °F) (Temporal)   Resp 14   Ht 1.702 m (5' 7\")   Wt 77.1 kg (170 lb)   SpO2 96%   BMI 26.63 kg/m²     Physical Exam  Vitals signs reviewed.   Constitutional:       General: She is not in acute distress.     Appearance: Normal appearance. She is well-developed. She is not ill-appearing.   HENT:      Head: Normocephalic and atraumatic.      Right Ear: Tympanic membrane and external ear normal.      Left Ear: Tympanic membrane and external ear normal.      Nose: Nose normal.      Mouth/Throat:      Mouth: Mucous membranes are moist.   Eyes:      Conjunctiva/sclera: Conjunctivae normal.      Pupils: Pupils are equal, round, and reactive to light.   Neck:      Musculoskeletal: Normal range of motion and neck supple.      Trachea: No tracheal deviation.   Cardiovascular:      Rate and Rhythm: Normal rate and regular rhythm.   Pulmonary:      Effort: Pulmonary effort is normal. No respiratory distress.      Breath sounds: Normal breath sounds. No stridor. No wheezing, rhonchi or rales.   Skin:     General: Skin is warm and dry.      Capillary Refill: Capillary refill takes less than 2 " seconds.   Neurological:      General: No focal deficit present.      Mental Status: She is alert and oriented to person, place, and time.   Psychiatric:         Mood and Affect: Mood normal.         Behavior: Behavior normal.           Assessment/Plan:     1. Pharyngitis, unspecified etiology  amoxicillin-clavulanate (AUGMENTIN) 875-125 MG Tab     Supportive care reviewed.  Continue salt water gargles, discontinue amoxicillin and start Augmentin.  Work note provided.    If symptoms worsen or persist patient can return to clinic for reevaluation. All side effects of medication discussed including allergic response, GI upset, tendon injury, etc. Patient confirmed understanding of information.    Please note that this dictation was created using voice recognition software. I have made every reasonable attempt to correct obvious errors, but I expect that there are errors of grammar and possibly content that I did not discover before finalizing the note.

## 2021-02-04 ENCOUNTER — HOSPITAL ENCOUNTER (OUTPATIENT)
Dept: LAB | Facility: MEDICAL CENTER | Age: 36
End: 2021-02-04
Attending: FAMILY MEDICINE
Payer: COMMERCIAL

## 2021-02-04 LAB
COVID ORDER STATUS COVID19: NORMAL
SARS-COV-2 RNA RESP QL NAA+PROBE: DETECTED
SPECIMEN SOURCE: ABNORMAL

## 2021-02-04 PROCEDURE — C9803 HOPD COVID-19 SPEC COLLECT: HCPCS

## 2021-02-04 PROCEDURE — U0003 INFECTIOUS AGENT DETECTION BY NUCLEIC ACID (DNA OR RNA); SEVERE ACUTE RESPIRATORY SYNDROME CORONAVIRUS 2 (SARS-COV-2) (CORONAVIRUS DISEASE [COVID-19]), AMPLIFIED PROBE TECHNIQUE, MAKING USE OF HIGH THROUGHPUT TECHNOLOGIES AS DESCRIBED BY CMS-2020-01-R: HCPCS

## 2021-02-04 PROCEDURE — U0005 INFEC AGEN DETEC AMPLI PROBE: HCPCS

## 2021-02-11 ENCOUNTER — HOSPITAL ENCOUNTER (OUTPATIENT)
Dept: RADIOLOGY | Facility: MEDICAL CENTER | Age: 36
End: 2021-02-11
Attending: FAMILY MEDICINE
Payer: COMMERCIAL

## 2021-02-11 DIAGNOSIS — R00.0 TACHYCARDIA: ICD-10-CM

## 2021-02-11 DIAGNOSIS — R06.02 SHORTNESS OF BREATH: ICD-10-CM

## 2021-02-11 PROCEDURE — 71275 CT ANGIOGRAPHY CHEST: CPT

## 2021-02-11 PROCEDURE — 700117 HCHG RX CONTRAST REV CODE 255: Performed by: FAMILY MEDICINE

## 2021-02-11 RX ADMIN — IOHEXOL 75 ML: 350 INJECTION, SOLUTION INTRAVENOUS at 11:39

## 2021-03-13 ENCOUNTER — HOSPITAL ENCOUNTER (EMERGENCY)
Facility: MEDICAL CENTER | Age: 36
End: 2021-03-13
Attending: EMERGENCY MEDICINE
Payer: COMMERCIAL

## 2021-03-13 ENCOUNTER — OFFICE VISIT (OUTPATIENT)
Dept: URGENT CARE | Facility: PHYSICIAN GROUP | Age: 36
End: 2021-03-13
Payer: COMMERCIAL

## 2021-03-13 ENCOUNTER — APPOINTMENT (OUTPATIENT)
Dept: RADIOLOGY | Facility: MEDICAL CENTER | Age: 36
End: 2021-03-13
Attending: EMERGENCY MEDICINE
Payer: COMMERCIAL

## 2021-03-13 VITALS
BODY MASS INDEX: 27.3 KG/M2 | HEIGHT: 67 IN | HEART RATE: 85 BPM | TEMPERATURE: 99 F | WEIGHT: 173.94 LBS | OXYGEN SATURATION: 99 % | RESPIRATION RATE: 18 BRPM | SYSTOLIC BLOOD PRESSURE: 124 MMHG | DIASTOLIC BLOOD PRESSURE: 77 MMHG

## 2021-03-13 VITALS
RESPIRATION RATE: 16 BRPM | SYSTOLIC BLOOD PRESSURE: 120 MMHG | OXYGEN SATURATION: 98 % | TEMPERATURE: 98.3 F | HEIGHT: 67 IN | HEART RATE: 105 BPM | WEIGHT: 170 LBS | BODY MASS INDEX: 26.68 KG/M2 | DIASTOLIC BLOOD PRESSURE: 80 MMHG

## 2021-03-13 DIAGNOSIS — R07.89 OTHER CHEST PAIN: ICD-10-CM

## 2021-03-13 DIAGNOSIS — R00.2 PALPITATIONS: ICD-10-CM

## 2021-03-13 DIAGNOSIS — R06.02 SOB (SHORTNESS OF BREATH): ICD-10-CM

## 2021-03-13 LAB
ALBUMIN SERPL BCP-MCNC: 4.1 G/DL (ref 3.2–4.9)
ALBUMIN/GLOB SERPL: 1.4 G/DL
ALP SERPL-CCNC: 118 U/L (ref 30–99)
ALT SERPL-CCNC: 13 U/L (ref 2–50)
ANION GAP SERPL CALC-SCNC: 9 MMOL/L (ref 7–16)
AST SERPL-CCNC: 13 U/L (ref 12–45)
BASOPHILS # BLD AUTO: 0.3 % (ref 0–1.8)
BASOPHILS # BLD: 0.03 K/UL (ref 0–0.12)
BILIRUB SERPL-MCNC: 0.4 MG/DL (ref 0.1–1.5)
BUN SERPL-MCNC: 9 MG/DL (ref 8–22)
CALCIUM SERPL-MCNC: 9.5 MG/DL (ref 8.4–10.2)
CHLORIDE SERPL-SCNC: 108 MMOL/L (ref 96–112)
CO2 SERPL-SCNC: 21 MMOL/L (ref 20–33)
CREAT SERPL-MCNC: 0.8 MG/DL (ref 0.5–1.4)
D DIMER PPP IA.FEU-MCNC: 0.35 UG/ML (FEU) (ref 0–0.5)
EKG IMPRESSION: NORMAL
EOSINOPHIL # BLD AUTO: 0.06 K/UL (ref 0–0.51)
EOSINOPHIL NFR BLD: 0.7 % (ref 0–6.9)
ERYTHROCYTE [DISTWIDTH] IN BLOOD BY AUTOMATED COUNT: 41.6 FL (ref 35.9–50)
GLOBULIN SER CALC-MCNC: 2.9 G/DL (ref 1.9–3.5)
GLUCOSE SERPL-MCNC: 82 MG/DL (ref 65–99)
HCG SERPL QL: NEGATIVE
HCT VFR BLD AUTO: 43.3 % (ref 37–47)
HGB BLD-MCNC: 14.6 G/DL (ref 12–16)
IMM GRANULOCYTES # BLD AUTO: 0.04 K/UL (ref 0–0.11)
IMM GRANULOCYTES NFR BLD AUTO: 0.4 % (ref 0–0.9)
LYMPHOCYTES # BLD AUTO: 1.59 K/UL (ref 1–4.8)
LYMPHOCYTES NFR BLD: 17.7 % (ref 22–41)
MCH RBC QN AUTO: 30.7 PG (ref 27–33)
MCHC RBC AUTO-ENTMCNC: 33.7 G/DL (ref 33.6–35)
MCV RBC AUTO: 91 FL (ref 81.4–97.8)
MONOCYTES # BLD AUTO: 0.75 K/UL (ref 0–0.85)
MONOCYTES NFR BLD AUTO: 8.4 % (ref 0–13.4)
NEUTROPHILS # BLD AUTO: 6.5 K/UL (ref 2–7.15)
NEUTROPHILS NFR BLD: 72.5 % (ref 44–72)
NRBC # BLD AUTO: 0 K/UL
NRBC BLD-RTO: 0 /100 WBC
PLATELET # BLD AUTO: 236 K/UL (ref 164–446)
PMV BLD AUTO: 10.5 FL (ref 9–12.9)
POTASSIUM SERPL-SCNC: 4.2 MMOL/L (ref 3.6–5.5)
PROT SERPL-MCNC: 7 G/DL (ref 6–8.2)
RBC # BLD AUTO: 4.76 M/UL (ref 4.2–5.4)
SODIUM SERPL-SCNC: 138 MMOL/L (ref 135–145)
TROPONIN T SERPL-MCNC: <6 NG/L (ref 6–19)
WBC # BLD AUTO: 9 K/UL (ref 4.8–10.8)

## 2021-03-13 PROCEDURE — 84484 ASSAY OF TROPONIN QUANT: CPT

## 2021-03-13 PROCEDURE — 36415 COLL VENOUS BLD VENIPUNCTURE: CPT

## 2021-03-13 PROCEDURE — 93000 ELECTROCARDIOGRAM COMPLETE: CPT | Performed by: STUDENT IN AN ORGANIZED HEALTH CARE EDUCATION/TRAINING PROGRAM

## 2021-03-13 PROCEDURE — 80053 COMPREHEN METABOLIC PANEL: CPT

## 2021-03-13 PROCEDURE — 99214 OFFICE O/P EST MOD 30 MIN: CPT | Performed by: STUDENT IN AN ORGANIZED HEALTH CARE EDUCATION/TRAINING PROGRAM

## 2021-03-13 PROCEDURE — 84703 CHORIONIC GONADOTROPIN ASSAY: CPT

## 2021-03-13 PROCEDURE — 71045 X-RAY EXAM CHEST 1 VIEW: CPT

## 2021-03-13 PROCEDURE — 99283 EMERGENCY DEPT VISIT LOW MDM: CPT

## 2021-03-13 PROCEDURE — 85025 COMPLETE CBC W/AUTO DIFF WBC: CPT

## 2021-03-13 PROCEDURE — 85379 FIBRIN DEGRADATION QUANT: CPT

## 2021-03-13 PROCEDURE — 93005 ELECTROCARDIOGRAM TRACING: CPT | Performed by: EMERGENCY MEDICINE

## 2021-03-13 PROCEDURE — 93005 ELECTROCARDIOGRAM TRACING: CPT

## 2021-03-13 ASSESSMENT — FIBROSIS 4 INDEX
FIB4 SCORE: 0.65
FIB4 SCORE: 0.65

## 2021-03-13 NOTE — PROGRESS NOTES
Subjective:   CHIEF COMPLAINT  Chief Complaint   Patient presents with   • Chest Pain     chest pain and pulpatation. since she had covid in feb. feels more when she is lying down.        HPI  Mckenzie Cash is a 35 y.o. female who presents with a chief complaint of left-sided chest pain and a racing heart for approximately 2 days.  She the discomfort as pressure and feels like someone is pushing on her chest.  Symptoms have been persistent since onset.  Says at times the discomfort is aggravated with physical activity.  Palpitations are most notable while lying down at night.  No specific triggers for either the chest pain or palpitations.  No alleviating factors.  She has tried using albuterol and budesonide/formoterol; she has not used albuterol in the last 48 hours but did use budesonide/formoterol yesterday.  At times she does feel left arm pain but no jaw pain.  She admits associated symptoms of shortness of breath but no diaphoresis or cough.  She has no previous history of hypertension, hyperlipidemia or diabetes.  She is on birth control (IUD).  Not currently taking any additional medications.  Patient is adopted does not know her family history.    Interval history: Patient was diagnosed with Covid last month.  Since that time she has been experiencing shortness of breath, chest pain and intermittent palpitations.  Symptoms have worsened within the last 48 hours.  A chest CTA was performed last month which was unremarkable.  The patient has a scheduled follow-up appointment with cardiology in 3 days.        REVIEW OF SYSTEMS  General: no fever or chills  GI: no nausea or vomiting  See HPI for further details.    PAST MEDICAL HISTORY  Patient Active Problem List    Diagnosis Date Noted   • Complex cyst of right ovary 10/04/2017   • Not immune to rubella 07/16/2012       SURGICAL HISTORY   has a past surgical history that includes laparoscopy (7/22/2011); carpal tunnel release (Right, 01/2017); and  "pelviscopy (N/A, 10/4/2017).    ALLERGIES  Allergies   Allergen Reactions   • Nkda [No Known Drug Allergy]    • Other Misc      \"Monocryl sutures\" part of incision came out and got drainage. Doctor said could be reason       CURRENT MEDICATIONS  Home Medications     Reviewed by Foreign De Anda D.O. (Physician) on 03/13/21 at 1212  Med List Status: <None>   Medication Last Dose Status   Budeson-Glycopyrrol-Formoterol (BREZTRI AEROSPHERE INH) Taking Active   celecoxib (CELEBREX) 200 MG Cap Not Taking Active   gabapentin (NEURONTIN) 100 MG Cap Not Taking Active   ibuprofen (MOTRIN) 200 MG Tab Not Taking Active   Levocetirizine Dihydrochloride (XYZAL) 5 MG Tab Not Taking Active   levonorgestrel (MIRENA, 52 MG,) 20 MCG/24HR IUD Not Taking Active                SOCIAL HISTORY  Social History     Tobacco Use   • Smoking status: Never Smoker   • Smokeless tobacco: Never Used   Substance and Sexual Activity   • Alcohol use: Not Currently     Comment: Rarely    • Drug use: No   • Sexual activity: Not on file       FAMILY HISTORY  Family History   Adopted: Yes          Objective:   PHYSICAL EXAM  VITAL SIGNS: /80   Pulse (!) 105   Temp 36.8 °C (98.3 °F) (Temporal)   Resp 16   Ht 1.702 m (5' 7\")   Wt 77.1 kg (170 lb)   SpO2 98%   BMI 26.63 kg/m²     Gen: no acute distress, normal voice  Skin: dry, intact, moist mucosal membranes  Lungs: CTAB w/ symmetric expansion  CV: Sinus tachycardia w/o murmurs or clicks  Psych: normal affect, normal judgement, alert, awake    EKG: NSR.  88 bpm.  Normal axis.  No P wave abnormalities.  No ST segment elevation or depression.  No T wave abnormalities.     Assessment/Plan:     1. Palpitations  EKG - Clinic Performed   2. Other chest pain     Differentials are broad.  EKG was unremarkable however the patient has subjective symptoms that need further evaluation than what can be provided in urgent care setting.  I discussed my concerns with the patient and she was instructed to go " the emergency room.  She agreed and said she would do so.  All questions were answered.     Differential diagnosis, natural history, supportive care, and indications for immediate follow-up discussed. All questions answered. Patient agrees with the plan of care.    Follow-up as needed if symptoms worsen or fail to improve to PCP, Urgent care or Emergency Room.    Please note that this dictation was created using voice recognition software. I have made a reasonable attempt to correct obvious errors, but I expect that there are errors of grammar and possibly content that I did not discover before finalizing the note.

## 2021-03-13 NOTE — ED PROVIDER NOTES
ED Provider Note    CHIEF COMPLAINT  Chief Complaint   Patient presents with   • Shortness of Breath     Had covid starting Jan 25th but not completely over it. Has appt with cardiologist next week    • Palpitations       HPI  Mckenzie Cash is a 35 y.o. female who presents with palpitations and shortness of breath.  The patient reports having Covid at the end of January however has had persistent symptoms similar to this since that time.  She reports at times her heart is racing and beating hard.  She reports some mild discomfort with this however no significant chest pain.  She does feel short of breath with these episodes.  She states that they happen more when she is exerting herself.  She denies any recent infectious symptoms such as fevers or cough.  No vomiting or diarrhea.  No lower extremity swelling.  The patient has a Mirena in place and denies chance of pregnancy however no oral contraception.  She has been seen by her primary care for this and had a CTA of her chest in the middle of February which was unremarkable.  She is a plan to follow-up with cardiology in 2 days however her symptoms have increased in frequency over the last few days therefore she presented here for evaluation.    REVIEW OF SYSTEMS  See HPI for further details.   Review of Systems   Constitutional: Negative for chills and fever.   HENT: Negative for sore throat.    Eyes: Negative for blurred vision and redness.   Respiratory: Positive for shortness of breath. Negative for cough.    Cardiovascular: Positive for chest pain and palpitations. Negative for leg swelling.   Gastrointestinal: Negative for abdominal pain and vomiting.   Genitourinary: Negative for dysuria and urgency.   Musculoskeletal: Negative for back pain and neck pain.   Skin: Negative for rash.   Neurological: Negative for focal weakness, seizures and headaches.   Psychiatric/Behavioral: Negative for suicidal ideas.         PAST MEDICAL HISTORY   has a past  "medical history of Generalized headaches, Other ovarian cyst, right side, Pelvic pain in female (10/02/2017), Plantar fasciitis, and Tendonitis.    SOCIAL HISTORY  Social History     Tobacco Use   • Smoking status: Never Smoker   • Smokeless tobacco: Never Used   Substance and Sexual Activity   • Alcohol use: Not Currently     Comment: Rarely    • Drug use: No   • Sexual activity: Not on file       SURGICAL HISTORY   has a past surgical history that includes laparoscopy (7/22/2011); carpal tunnel release (Right, 01/2017); and pelviscopy (N/A, 10/4/2017).    CURRENT MEDICATIONS  Home Medications     Reviewed by Natalie Cifuentes (Pharmacy Tech) on 03/13/21 at 1340  Med List Status: Complete   Medication Last Dose Status   Budeson-Glycopyrrol-Formoterol (BREZTRI AEROSPHERE) 160-9-4.8 MCG/ACT Aerosol 3/12/2021 Active   Levocetirizine Dihydrochloride (XYZAL) 5 MG Tab 3/12/2021 Active   levonorgestrel (MIRENA, 52 MG,) 20 MCG/24HR IUD PLACED 2020 Active                ALLERGIES  Allergies   Allergen Reactions   • Nkda [No Known Drug Allergy]    • Other Misc      \"Monocryl sutures\" part of incision came out and got drainage. Doctor said could be reason       PHYSICAL EXAM   VITAL SIGNS: /77   Pulse 85   Temp 37.2 °C (99 °F) (Temporal)   Resp 18   Ht 1.702 m (5' 7\")   Wt 78.9 kg (173 lb 15.1 oz)   SpO2 99%   BMI 27.24 kg/m²      Physical Exam   Constitutional: She is oriented to person, place, and time and well-developed, well-nourished, and in no distress. No distress.   Nontoxic-appearing young female   HENT:   Head: Normocephalic and atraumatic.   Eyes: Pupils are equal, round, and reactive to light. Conjunctivae are normal.   Cardiovascular: Normal rate, regular rhythm and normal heart sounds.   Pulmonary/Chest: Effort normal and breath sounds normal. No respiratory distress.   Abdominal: Soft. She exhibits no distension. There is no abdominal tenderness.   Musculoskeletal:         General: No tenderness " or edema. Normal range of motion.      Cervical back: Normal range of motion and neck supple.   Neurological: She is alert and oriented to person, place, and time.   Moving all extremities spontaneously   Skin: Skin is warm and dry.   Psychiatric: Affect normal.         DIAGNOSTIC STUDIES    EKG  Results for orders placed or performed during the hospital encounter of 21   EKG   Result Value Ref Range    Report       [facility] Emergency Dept.    Test Date:  2021  Pt Name:    TOMMY BLANTON              Department[facility]  MRN:        [id number]                      [facility]  Gender:     Female                       Technician: RK  :        1985                   Requested By:ER TRIAGE PROTOCOL  Order #:    [id number]                    Reading MD: Norma Hammond MD    Measurements  Intervals                                Axis  Rate:       82                           P:          64  NH:         131                          QRS:        46  QRSD:       81                           T:          16  QT:         352  QTc:        411    Interpretive Statements  Sinus rhythm  Normal axis and intervals  No ectopy or hypertrophy  No ST or T wave change  No previous ECG available for comparison  Electronically Signed On 3- 14:31:18 PST by Norma Hammond MD             LABS  Personally reviewed by me  Labs Reviewed   CBC WITH DIFFERENTIAL - Abnormal; Notable for the following components:       Result Value    Neutrophils-Polys 72.50 (*)     Lymphocytes 17.70 (*)     All other components within normal limits   COMP METABOLIC PANEL - Abnormal; Notable for the following components:    Alkaline Phosphatase 118 (*)     All other components within normal limits   TROPONIN   HCG QUAL SERUM   ESTIMATED GFR   D-DIMER           RADIOLOGY  Personally reviewed by me  DX-CHEST-PORTABLE (1 VIEW)   Final Result      1.  There is no acute cardiopulmonary process.            ED  COURSE  Vitals:    03/13/21 1345 03/13/21 1433 03/13/21 1434 03/13/21 1528   BP:  121/82  124/77   Pulse: 79 81 96 85   Resp: 15   18   Temp:       TempSrc:       SpO2: 92% 98% 99% 99%   Weight:       Height:             Medications administered:  Medications - No data to display      Old records personally reviewed:  Reviewed CTA from February 11 which showed no evidence of pulmonary embolism        MEDICAL DECISION MAKING  Young relatively healthy patient who presents with ongoing palpitations and shortness of breath since Covid infection over 1 month prior.  She is nontoxic-appearing with normal vitals on arrival.  She has no signs of hypoxia or respiratory distress.  Her EKG is normal without ischemia or arrhythmia.  Labs are reassuring with normal troponin.  Clinically doubt ACS.  The patient's D-dimer is negative making her risk for pulmonary embolism very low and she has had recent imaging as well that ruled this out.  Chest x-ray does not show pneumonia, pneumothorax, pulmonary edema.  Labs are otherwise reassuring without anemia or electrolyte abnormality.  She was monitored here in cardiac monitoring without apparent event.  I feel that her symptoms may be continuing from her recent Covid infection.  She is appropriate follow-up in 2 days with cardiology and may benefit from Holter monitor.    Upon reassessment, patient is resting comfortably with normal vital signs.  No new complaints at this time.  Discussed results with patient and/or family as well as importance of primary care and cardiology follow up.  Patient understands plan of care and strict return precautions for new or changing symptoms.         IMPRESSION  (R00.2) Palpitations  (R06.02) SOB (shortness of breath)    Disposition: Discharge home, stable condition  Results, diagnoses, and treatment options were discussed with the patient and/or family. Patient verbalized understanding of plan of care.    Patient referred to primary care provider  for monitoring and treatment of blood pressure.      Discharge Medication List as of 3/13/2021  2:51 PM              Electronically signed by: Norma Hammond M.D., 3/13/2021 2:49 PM

## 2021-03-13 NOTE — DISCHARGE INSTRUCTIONS
You were seen in the Emergency Department for palpitations and shortness of breath.    EKG, labs, chest x-ray were completed without significant acute abnormalities.    Please use 1,000mg of tylenol or 600mg of ibuprofen every 6 hours as needed for pain.    Please follow up with your primary care physician and cardiology as scheduled.    Return to the Emergency Department with worsening chest pain, trouble breathing, lightheadedness or fainting, or other concerns.

## 2021-03-14 ASSESSMENT — ENCOUNTER SYMPTOMS
NECK PAIN: 0
VOMITING: 0
EYE REDNESS: 0
BLURRED VISION: 0
FOCAL WEAKNESS: 0
CHILLS: 0
FEVER: 0
SORE THROAT: 0
ABDOMINAL PAIN: 0
BACK PAIN: 0
COUGH: 0
SEIZURES: 0
PALPITATIONS: 1
SHORTNESS OF BREATH: 1
HEADACHES: 0

## 2021-03-16 ENCOUNTER — OFFICE VISIT (OUTPATIENT)
Dept: CARDIOLOGY | Facility: MEDICAL CENTER | Age: 36
End: 2021-03-16
Payer: COMMERCIAL

## 2021-03-16 VITALS
BODY MASS INDEX: 27.4 KG/M2 | WEIGHT: 174.6 LBS | OXYGEN SATURATION: 99 % | HEIGHT: 67 IN | HEART RATE: 107 BPM | RESPIRATION RATE: 14 BRPM | SYSTOLIC BLOOD PRESSURE: 116 MMHG | DIASTOLIC BLOOD PRESSURE: 80 MMHG

## 2021-03-16 DIAGNOSIS — R00.2 PALPITATIONS: ICD-10-CM

## 2021-03-16 DIAGNOSIS — U07.1 COVID-19 VIRUS INFECTION: ICD-10-CM

## 2021-03-16 DIAGNOSIS — R07.89 OTHER CHEST PAIN: ICD-10-CM

## 2021-03-16 DIAGNOSIS — R06.09 DOE (DYSPNEA ON EXERTION): ICD-10-CM

## 2021-03-16 DIAGNOSIS — I49.3 PVC (PREMATURE VENTRICULAR CONTRACTION): ICD-10-CM

## 2021-03-16 PROCEDURE — 99244 OFF/OP CNSLTJ NEW/EST MOD 40: CPT | Performed by: INTERNAL MEDICINE

## 2021-03-16 RX ORDER — AMOXICILLIN AND CLAVULANATE POTASSIUM 500; 125 MG/1; MG/1
TABLET, FILM COATED ORAL
COMMUNITY
Start: 2021-02-03 | End: 2021-03-16

## 2021-03-16 RX ORDER — ALBUTEROL SULFATE 90 UG/1
AEROSOL, METERED RESPIRATORY (INHALATION)
COMMUNITY
Start: 2021-02-10 | End: 2021-03-16

## 2021-03-16 RX ORDER — DEXAMETHASONE 2 MG/1
2 TABLET ORAL
COMMUNITY
Start: 2021-02-10 | End: 2021-03-16

## 2021-03-16 ASSESSMENT — FIBROSIS 4 INDEX: FIB4 SCORE: 0.5

## 2021-03-16 NOTE — PROGRESS NOTES
Subjective:   Chief Complaint:   Chief Complaint   Patient presents with   • Chest Pain     NP   • Palpitations     NP       Mckenzie Cash is a 35 y.o. female who is referred by Dr. Rogerio Monaco for chest pain and palpitations after COVID-19 infection.    Patient had Covid January 2020.  Originally treated for strep which happens a few times per year.  Only feels 70% herself.    Very slow to get better.  Noted shortness of breath at rest, chest pain and palpitations.  Sometimes lightheaded, sometimes heart is racing 130-140 bpm.  Chest tightness can happen at rest or activity.  Gets very fatigued also.  No syncope.  No symptoms of leg claudication.   No stroke/TIA like symptoms.    Reviewed emergency room record 3/13/2021, admitted for shortness of breath and palpitations.  Had a normal CT PE study 2/11/2021.  Also in the ED 1/20/2021.  HS trop normal.  No echo yet.    No prior hypertension.  No prior hyperlipidemia.  No prior smoking history.  No history of diabetes.  No history of autoimmune disease such as lupus or rheumatoid arthritis.  No chronic kidney disease.  No ETOH overuse.  No caffeine overuse. 0-3.  No recreation substance use.  No hx asthma.    Adopted, FH not know.    Lives in [place].  Licensed , echos, CT, ECG on dogs.  Born at [facility].  Has horses, not able to ride.  Has 8 and 6 year old.    DATA REVIEWED by me:  ECG (my personal interpretation) 3/13/2021  Sinus, 82, normal    Echo pending    CTA 2/11/2021  Normal    Most recent labs:       Lab Results   Component Value Date/Time    WBC 9.0 03/13/2021 01:30 PM    HEMOGLOBIN 14.6 03/13/2021 01:30 PM    HEMATOCRIT 43.3 03/13/2021 01:30 PM    MCV 91.0 03/13/2021 01:30 PM    INR 1.15 (H) 03/06/2009 08:45 PM      Lab Results   Component Value Date/Time    SODIUM 138 03/13/2021 01:30 PM    POTASSIUM 4.2 03/13/2021 01:30 PM    CHLORIDE 108 03/13/2021 01:30 PM    CO2 21 03/13/2021 01:30 PM    GLUCOSE 82 03/13/2021 01:30 PM    BUN 9  03/13/2021 01:30 PM    CREATININE 0.80 03/13/2021 01:30 PM    CREATININE 0.9 03/13/2009 01:55 AM      Lab Results   Component Value Date/Time    ASTSGOT 13 03/13/2021 01:30 PM    ALTSGPT 13 03/13/2021 01:30 PM    ALBUMIN 4.1 03/13/2021 01:30 PM      No results found for: CHOLSTRLTOT, LDL, HDL, TRIGLYCERIDE  No results for input(s): NTPROBNP, TROPONINT in the last 72 hours.      Past Medical History:   Diagnosis Date   • Generalized headaches    • Other ovarian cyst, right side    • Pelvic pain in female 10/02/2017    2/10   • Plantar fasciitis    • Tendonitis      Past Surgical History:   Procedure Laterality Date   • PELVISCOPY N/A 10/4/2017    Procedure: PELVISCOPY;  Surgeon: Eliza Carrizales M.D.;  Location: SURGERY [facility];  Service: Gynecology   • CARPAL TUNNEL RELEASE Right 01/2017   • LAPAROSCOPY  7/22/2011    Performed by ROSS VELAZQUEZ at SURGERY [facility]     Family History   Adopted: Yes     Social History     Socioeconomic History   • Marital status: Single     Spouse name: Not on file   • Number of children: Not on file   • Years of education: Not on file   • Highest education level: Not on file   Occupational History   • Not on file   Tobacco Use   • Smoking status: Never Smoker   • Smokeless tobacco: Never Used   Substance and Sexual Activity   • Alcohol use: Not Currently     Comment: Rarely    • Drug use: No   • Sexual activity: Not on file   Other Topics Concern   • Not on file   Social History Narrative    ** Merged History Encounter **          Social Determinants of Health     Financial Resource Strain:    • Difficulty of Paying Living Expenses:    Food Insecurity:    • Worried About Running Out of Food in the Last Year:    • Ran Out of Food in the Last Year:    Transportation Needs:    • Lack of Transportation (Medical):    • Lack of Transportation (Non-Medical):    Physical Activity:    • Days of Exercise per Week:    • Minutes of Exercise per Session:    Stress:    •  "Feeling of Stress :    Social Connections:    • Frequency of Communication with Friends and Family:    • Frequency of Social Gatherings with Friends and Family:    • Attends Manda[employer] Services:    • Active Member of Clubs or Organizations:    • Attends Club or Organization Meetings:    • Marital Status:    Intimate Partner Violence:    • Fear of Current or Ex-Partner:    • Emotionally Abused:    • Physically Abused:    • Sexually Abused:      Allergies   Allergen Reactions   • Nkda [No Known Drug Allergy]    • Other Misc      \"Monocryl sutures\" part of incision came out and got drainage. Doctor said could be reason       Current Outpatient Medications   Medication Sig Dispense Refill   • metoprolol tartrate (LOPRESSOR) 25 MG Tab Take 0.5 Tablets by mouth 2 times a day. 180 tablet 3   • Budeson-Glycopyrrol-Formoterol (BREZTRI AEROSPHERE) 160-9-4.8 MCG/ACT Aerosol Inhale 1-2 Puffs 1 time a day as needed.     • Levocetirizine Dihydrochloride (XYZAL) 5 MG Tab Take 5 mg by mouth 2 times a day as needed.     • levonorgestrel (MIRENA, 52 MG,) 20 MCG/24HR IUD 1 Each by Intrauterine route Once.       No current facility-administered medications for this visit.       ROS  All others systems reviewed and negative.     Objective:     /80 (BP Location: Left arm, Patient Position: Sitting, BP Cuff Size: Adult)   Pulse (!) 107   Resp 14   Ht 1.702 m (5' 7\")   Wt 79.2 kg (174 lb 9.6 oz)   SpO2 99%  Body mass index is 27.35 kg/m².    General: No acute distress. Well nourished.  HEENT: EOM grossly intact, no scleral icterus, no pharyngeal erythema.   Neck:  No JVD, no bruits, trachea midline  CVS: RRR. Normal S1, S2. No M/R/G. No LE edema.  2+ radial pulses, 2+ PT pulses  Resp: CTAB. No wheezing or crackles/rhonchi. Normal respiratory effort.  Abdomen: Soft, NT, no nolan hepatomegaly.  MSK/Ext: No clubbing or cyanosis.  Skin: Warm and dry, no rashes.  Neurological: CN III-XII grossly intact. No focal deficits.   Psych: A&O " x 3, appropriate affect, good judgement        Assessment:     1. Other chest pain  EC-ECHOCARDIOGRAM COMPLETE W/O CONT   2. Palpitations  ZIO PATCH MONITOR    TSH WITH REFLEX TO FT4   3. COVID-19 virus infection  EC-ECHOCARDIOGRAM COMPLETE W/O CONT   4. CEDILLO (dyspnea on exertion)  EC-ECHOCARDIOGRAM COMPLETE W/O CONT   5. PVC (premature ventricular contraction)  ZIO PATCH MONITOR       Medical Decision Making:  Today's Assessment / Status / Plan:     -I have seen her symptoms in post covid pts and she is having a prolonged recovery (long haulers)  -Sounds like PVCs  -Needs echo, zio, TSH  -Try BB or CCB or MG  -Only limited by her own symptoms, no concern for harm  -RTC 4 months with APN after testing      Written instructions given today:    -Echocardiogram- heart pictures to look at the heart structure and pump function.    Zio patch- ok to turn in early or start metoprolol while wearing it, just let me know if you do that, [employer] message.    [email or web address]    -Metoprolol tartrate 12.5 mg AM and/or PM (1/2 tablet), either scheduled or as needed (12 hour medication).  If this lowest dose is not helping, increase to 25 mg on your own (1 tablet). Max dose is 100 mg AM and PM.    -If the metoprolol makes you feel worse, next medication would be cardizem 120 mg once daily (24 hour).    -Sometimes OTC magnesium helps with PVCs, only dose, only brand      Return in about 2 months (around 5/16/2021).    It is my pleasure to participate in the care of Ms. Cash.  Please do not hesitate to contact me with questions or concerns.    Elizabeth Jackson MD, [facility]  Cardiologist [facility]    Please note that this dictation was created using voice recognition software. I have made every reasonable attempt to correct obvious errors, but it is possible there are errors of grammar and possibly content that I did not discover before finalizing the note.

## 2021-03-16 NOTE — LETTER
[facility]   [address], [address]  [place] [address]  Phone: [phone]  Fax: [phone]              Mckenzie Cash  1985    Encounter Date: 3/16/2021    Elizabeth Jackson M.D.          PROGRESS NOTE:  Subjective:   Chief Complaint:   Chief Complaint   Patient presents with   • Chest Pain     NP   • Palpitations     NP       Mckenzie Cash is a 35 y.o. female who is referred by Dr. Rogerio Monaco for chest pain and palpitations after COVID-19 infection.    Patient had Covid January 2020.  Originally treated for strep which happens a few times per year.  Only feels 70% herself.    Very slow to get better.  Noted shortness of breath at rest, chest pain and palpitations.  Sometimes lightheaded, sometimes heart is racing 130-140 bpm.  Chest tightness can happen at rest or activity.  Gets very fatigued also.  No syncope.  No symptoms of leg claudication.   No stroke/TIA like symptoms.    Reviewed emergency room record 3/13/2021, admitted for shortness of breath and palpitations.  Had a normal CT PE study 2/11/2021.  Also in the ED 1/20/2021.  HS trop normal.  No echo yet.    No prior hypertension.  No prior hyperlipidemia.  No prior smoking history.  No history of diabetes.  No history of autoimmune disease such as lupus or rheumatoid arthritis.  No chronic kidney disease.  No ETOH overuse.  No caffeine overuse. 0-3.  No recreation substance use.  No hx asthma.    Adopted, FH not know.    Lives in [place].  Licensed , echos, CT, ECG on dogs.  Born at [employer].  Has horses, not able to ride.  Has 8 and 6 year old.    DATA REVIEWED by me:  ECG (my personal interpretation) 3/13/2021  Sinus, 82, normal    Echo pending    CTA 2/11/2021  Normal    Most recent labs:       Lab Results   Component Value Date/Time    WBC 9.0 03/13/2021 01:30 PM    HEMOGLOBIN 14.6 03/13/2021 01:30 PM    HEMATOCRIT 43.3 03/13/2021 01:30 PM    MCV 91.0  03/13/2021 01:30 PM    INR 1.15 (H) 03/06/2009 08:45 PM      Lab Results   Component Value Date/Time    SODIUM 138 03/13/2021 01:30 PM    POTASSIUM 4.2 03/13/2021 01:30 PM    CHLORIDE 108 03/13/2021 01:30 PM    CO2 21 03/13/2021 01:30 PM    GLUCOSE 82 03/13/2021 01:30 PM    BUN 9 03/13/2021 01:30 PM    CREATININE 0.80 03/13/2021 01:30 PM    CREATININE 0.9 03/13/2009 01:55 AM      Lab Results   Component Value Date/Time    ASTSGOT 13 03/13/2021 01:30 PM    ALTSGPT 13 03/13/2021 01:30 PM    ALBUMIN 4.1 03/13/2021 01:30 PM      No results found for: CHOLSTRLTOT, LDL, HDL, TRIGLYCERIDE  No results for input(s): NTPROBNP, TROPONINT in the last 72 hours.      Past Medical History:   Diagnosis Date   • Generalized headaches    • Other ovarian cyst, right side    • Pelvic pain in female 10/02/2017    2/10   • Plantar fasciitis    • Tendonitis      Past Surgical History:   Procedure Laterality Date   • PELVISCOPY N/A 10/4/2017    Procedure: PELVISCOPY;  Surgeon: Eliza Carrizales M.D.;  Location: SURGERY [facility];  Service: Gynecology   • CARPAL TUNNEL RELEASE Right 01/2017   • LAPAROSCOPY  7/22/2011    Performed by ROSS VELAZQUEZ at SURGERY [facility]     Family History   Adopted: Yes     Social History     Socioeconomic History   • Marital status: Single     Spouse name: Not on file   • Number of children: Not on file   • Years of education: Not on file   • Highest education level: Not on file   Occupational History   • Not on file   Tobacco Use   • Smoking status: Never Smoker   • Smokeless tobacco: Never Used   Substance and Sexual Activity   • Alcohol use: Not Currently     Comment: Rarely    • Drug use: No   • Sexual activity: Not on file   Other Topics Concern   • Not on file   Social History Narrative    ** Merged History Encounter **          Social Determinants of Health     Financial Resource Strain:    • Difficulty of Paying Living Expenses:    Food Insecurity:    • Worried About Running Out  "of Food in the Last Year:    • Ran Out of Food in the Last Year:    Transportation Needs:    • Lack of Transportation (Medical):    • Lack of Transportation (Non-Medical):    Physical Activity:    • Days of Exercise per Week:    • Minutes of Exercise per Session:    Stress:    • Feeling of Stress :    Social Connections:    • Frequency of Communication with Friends and Family:    • Frequency of Social Gatherings with Friends and Family:    • Attends [employer] Services:    • Active Member of Clubs or Organizations:    • Attends Club or Organization Meetings:    • Marital Status:    Intimate Partner Violence:    • Fear of Current or Ex-Partner:    • Emotionally Abused:    • Physically Abused:    • Sexually Abused:      Allergies   Allergen Reactions   • Nkda [No Known Drug Allergy]    • Other Misc      \"Monocryl sutures\" part of incision came out and got drainage. Doctor said could be reason       Current Outpatient Medications   Medication Sig Dispense Refill   • metoprolol tartrate (LOPRESSOR) 25 MG Tab Take 0.5 Tablets by mouth 2 times a day. 180 tablet 3   • Budeson-Glycopyrrol-Formoterol (BREZTRI AEROSPHERE) 160-9-4.8 MCG/ACT Aerosol Inhale 1-2 Puffs 1 time a day as needed.     • Levocetirizine Dihydrochloride (XYZAL) 5 MG Tab Take 5 mg by mouth 2 times a day as needed.     • levonorgestrel (MIRENA, 52 MG,) 20 MCG/24HR IUD 1 Each by Intrauterine route Once.       No current facility-administered medications for this visit.       ROS  All others systems reviewed and negative.     Objective:     /80 (BP Location: Left arm, Patient Position: Sitting, BP Cuff Size: Adult)   Pulse (!) 107   Resp 14   Ht 1.702 m (5' 7\")   Wt 79.2 kg (174 lb 9.6 oz)   SpO2 99%  Body mass index is 27.35 kg/m².    General: No acute distress. Well nourished.  HEENT: EOM grossly intact, no scleral icterus, no pharyngeal erythema.   Neck:  No JVD, no bruits, trachea midline  CVS: RRR. Normal S1, S2. No M/R/G. No LE edema.  2+ " radial pulses, 2+ PT pulses  Resp: CTAB. No wheezing or crackles/rhonchi. Normal respiratory effort.  Abdomen: Soft, NT, no nolan hepatomegaly.  MSK/Ext: No clubbing or cyanosis.  Skin: Warm and dry, no rashes.  Neurological: CN III-XII grossly intact. No focal deficits.   Psych: A&O x 3, appropriate affect, good judgement        Assessment:     1. Other chest pain  EC-ECHOCARDIOGRAM COMPLETE W/O CONT   2. Palpitations  ZIO PATCH MONITOR    TSH WITH REFLEX TO FT4   3. COVID-19 virus infection  EC-ECHOCARDIOGRAM COMPLETE W/O CONT   4. CEDILLO (dyspnea on exertion)  EC-ECHOCARDIOGRAM COMPLETE W/O CONT   5. PVC (premature ventricular contraction)  ZIO PATCH MONITOR       Medical Decision Making:  Today's Assessment / Status / Plan:     -I have seen her symptoms in post covid pts and she is having a prolonged recovery (long haulers)  -Sounds like PVCs  -Needs echo, zio, TSH  -Try BB or CCB or MG  -Only limited by her own symptoms, no concern for harm  -RTC 4 months with APN after testing      Written instructions given today:    -Echocardiogram- heart pictures to look at the heart structure and pump function.    Zio patch- ok to turn in early or start metoprolol while wearing it, just let me know if you do that, [employer] message.    [email or web address]    -Metoprolol tartrate 12.5 mg AM and/or PM (1/2 tablet), either scheduled or as needed (12 hour medication).  If this lowest dose is not helping, increase to 25 mg on your own (1 tablet). Max dose is 100 mg AM and PM.    -If the metoprolol makes you feel worse, next medication would be cardizem 120 mg once daily (24 hour).    -Sometimes OTC magnesium helps with PVCs, only dose, only brand      Return in about 2 months (around 5/16/2021).    It is my pleasure to participate in the care of Ms. Cash.  Please do not hesitate to contact me with questions or concerns.    Elizabeth Jackson MD, [facility]  Cardiologist [facility]    Please note that  this dictation was created using voice recognition software. I have made every reasonable attempt to correct obvious errors, but it is possible there are errors of grammar and possibly content that I did not discover before finalizing the note.        Rogerio Monaco M.D.  [address] [place] [address] [phone]  [facility] [id number]  Via Fax: [phone]

## 2021-03-16 NOTE — PATIENT INSTRUCTIONS
-Echocardiogram- heart pictures to look at the heart structure and pump function.    Zio patch- ok to turn in early or start metoprolol while wearing it, just let me know if you do that, [employer] message.    [email or web address]    -Metoprolol tartrate 12.5 mg AM and/or PM (1/2 tablet), either scheduled or as needed (12 hour medication).  If this lowest dose is not helping, increase to 25 mg on your own (1 tablet). Max dose is 100 mg AM and PM.    -If the metoprolol makes you feel worse, next medication would be cardizem 120 mg once daily (24 hour).    -Sometimes OTC magnesium helps with PVCs, only dose, only brand

## 2021-03-18 ENCOUNTER — PATIENT MESSAGE (OUTPATIENT)
Dept: CARDIOLOGY | Facility: MEDICAL CENTER | Age: 36
End: 2021-03-18

## 2021-03-18 NOTE — PROGRESS NOTES
Plse right letter.    Symptoms due to covid recovery. Pt should be allowed to sit and take breaks as needed during the day.    Thank you,  LS

## 2021-03-22 ENCOUNTER — HOSPITAL ENCOUNTER (OUTPATIENT)
Dept: LAB | Facility: MEDICAL CENTER | Age: 36
End: 2021-03-22
Attending: INTERNAL MEDICINE
Payer: COMMERCIAL

## 2021-03-22 DIAGNOSIS — R00.2 PALPITATIONS: ICD-10-CM

## 2021-03-22 PROCEDURE — 84443 ASSAY THYROID STIM HORMONE: CPT

## 2021-03-22 PROCEDURE — 36415 COLL VENOUS BLD VENIPUNCTURE: CPT

## 2021-03-23 LAB — TSH SERPL DL<=0.005 MIU/L-ACNC: 0.79 UIU/ML (ref 0.38–5.33)

## 2021-04-08 ENCOUNTER — OFFICE VISIT (OUTPATIENT)
Dept: URGENT CARE | Facility: PHYSICIAN GROUP | Age: 36
End: 2021-04-08
Payer: COMMERCIAL

## 2021-04-08 VITALS
HEART RATE: 81 BPM | RESPIRATION RATE: 18 BRPM | DIASTOLIC BLOOD PRESSURE: 68 MMHG | TEMPERATURE: 98.7 F | HEIGHT: 67 IN | SYSTOLIC BLOOD PRESSURE: 124 MMHG | BODY MASS INDEX: 26.68 KG/M2 | OXYGEN SATURATION: 98 % | WEIGHT: 170 LBS

## 2021-04-08 DIAGNOSIS — J02.9 SORE THROAT: ICD-10-CM

## 2021-04-08 DIAGNOSIS — J02.0 STREP PHARYNGITIS: ICD-10-CM

## 2021-04-08 LAB
INT CON NEG: NEGATIVE
INT CON POS: POSITIVE
S PYO AG THROAT QL: POSITIVE

## 2021-04-08 PROCEDURE — 87880 STREP A ASSAY W/OPTIC: CPT | Performed by: PHYSICIAN ASSISTANT

## 2021-04-08 PROCEDURE — 99214 OFFICE O/P EST MOD 30 MIN: CPT | Performed by: PHYSICIAN ASSISTANT

## 2021-04-08 RX ORDER — AMOXICILLIN 500 MG/1
500 CAPSULE ORAL 2 TIMES DAILY
Qty: 14 CAPSULE | Refills: 0 | Status: SHIPPED | OUTPATIENT
Start: 2021-04-08 | End: 2021-04-15

## 2021-04-08 ASSESSMENT — ENCOUNTER SYMPTOMS
SORE THROAT: 1
SWOLLEN GLANDS: 0
SHORTNESS OF BREATH: 0
COUGH: 0
TROUBLE SWALLOWING: 0

## 2021-04-08 ASSESSMENT — FIBROSIS 4 INDEX: FIB4 SCORE: 0.5

## 2021-04-08 ASSESSMENT — PAIN SCALES - GENERAL: PAINLEVEL: 4=SLIGHT-MODERATE PAIN

## 2021-04-08 NOTE — LETTER
April 8, 2021         Patient: Mckenzie Cash   YOB: 1985   Date of Visit: 4/8/2021           To Whom it May Concern:    Mckenzie Cash was seen in my clinic on 4/8/2021.  She can return to work for 1221.  Please excuse her recent absence.    If you have any questions or concerns, please don't hesitate to call.        Sincerely,           Aditya Da Silva P.A.-C.  Electronically Signed

## 2021-04-08 NOTE — LETTER
April 8, 2021         Patient: Mckenzie Cash   YOB: 1985   Date of Visit: 4/8/2021           To Whom it May Concern:    Mckenzie Cash was seen in my clinic on 4/8/2021.  Please excuse her absence from work.    If you have any questions or concerns, please don't hesitate to call.        Sincerely,           Aditya Da Silva P.A.-C.  Electronically Signed

## 2021-04-08 NOTE — LETTER
April 8, 2021         Patient: Mckenzie Cash   YOB: 1985   Date of Visit: 4/8/2021           To Whom it May Concern:    Mckenzie Cash was seen in my clinic on 4/8/2021. She can return to work 4/12/21. Please excuse her recent absence.    If you have any questions or concerns, please don't hesitate to call.        Sincerely,           Aditya Da Silva P.A.-C.  Electronically Signed

## 2021-04-09 NOTE — PROGRESS NOTES
"  Subjective:   Mckenzie Cash is a 35 y.o. female who presents today with   Chief Complaint   Patient presents with   • Pharyngitis     sx started this morning with sore throat, headaches and painfull swallowing.        Pharyngitis   This is a new problem. The current episode started today. The problem has been gradually worsening. There has been no fever. The pain is moderate. Pertinent negatives include no congestion, coughing, shortness of breath, swollen glands or trouble swallowing. She has tried nothing for the symptoms. The treatment provided no relief.   Has history of strep.  Had both strep and Covid in January.  She still has some amoxicillin antibiotics from previous time because she was switched to Augmentin.    PMH:  has a past medical history of Generalized headaches, Other ovarian cyst, right side, Pelvic pain in female (10/02/2017), Plantar fasciitis, and Tendonitis.  MEDS:   Current Outpatient Medications:   •  amoxicillin (AMOXIL) 500 MG Cap, Take 1 capsule by mouth 2 times a day for 7 days., Disp: 14 capsule, Rfl: 0  •  metoprolol tartrate (LOPRESSOR) 25 MG Tab, Take 0.5 Tablets by mouth 2 times a day., Disp: 180 tablet, Rfl: 3  •  Levocetirizine Dihydrochloride (XYZAL) 5 MG Tab, Take 5 mg by mouth 2 times a day as needed., Disp: , Rfl:   •  levonorgestrel (MIRENA, 52 MG,) 20 MCG/24HR IUD, 1 Each by Intrauterine route Once., Disp: , Rfl:   •  Budeson-Glycopyrrol-Formoterol (BREZTRI AEROSPHERE) 160-9-4.8 MCG/ACT Aerosol, Inhale 1-2 Puffs 1 time a day as needed., Disp: , Rfl:   ALLERGIES:   Allergies   Allergen Reactions   • Nkda [No Known Drug Allergy]    • Other Misc      \"Monocryl sutures\" part of incision came out and got drainage. Doctor said could be reason     SURGHX:   Past Surgical History:   Procedure Laterality Date   • PELVISCOPY N/A 10/4/2017    Procedure: PELVISCOPY;  Surgeon: Eliza Carrizales M.D.;  Location: SURGERY [facility];  Service: Gynecology   • CARPAL " "TUNNEL RELEASE Right 01/2017   • LAPAROSCOPY  7/22/2011    Performed by ROSS VELAZQUEZ at SURGERY [facility]     SOCHX:  reports that she has never smoked. She has never used smokeless tobacco. She reports previous alcohol use. She reports that she does not use drugs.  FH: Reviewed with patient, not pertinent to this visit.       Review of Systems   HENT: Positive for sore throat. Negative for congestion and trouble swallowing.    Respiratory: Negative for cough and shortness of breath.       Objective:   /68 (BP Location: Right arm, Patient Position: Sitting, BP Cuff Size: Adult)   Pulse 81   Temp 37.1 °C (98.7 °F) (Temporal)   Resp 18   Ht 1.702 m (5' 7\")   Wt 77.1 kg (170 lb)   SpO2 98%   BMI 26.63 kg/m²   Physical Exam  Vitals and nursing note reviewed.   Constitutional:       General: She is not in acute distress.     Appearance: Normal appearance. She is well-developed. She is not ill-appearing or toxic-appearing.   HENT:      Head: Normocephalic and atraumatic.      Right Ear: Hearing normal.      Left Ear: Hearing normal.      Mouth/Throat:      Pharynx: Posterior oropharyngeal erythema present. No uvula swelling.      Tonsils: No tonsillar exudate or tonsillar abscesses.   Eyes:      Pupils: Pupils are equal, round, and reactive to light.   Cardiovascular:      Rate and Rhythm: Normal rate and regular rhythm.      Heart sounds: Normal heart sounds.   Pulmonary:      Effort: Pulmonary effort is normal.      Breath sounds: Normal breath sounds.   Musculoskeletal:      Comments: Normal movement in all 4 extremities   Lymphadenopathy:      Cervical: No cervical adenopathy.   Skin:     General: Skin is warm and dry.   Neurological:      Mental Status: She is alert.      Coordination: Coordination normal.   Psychiatric:         Mood and Affect: Mood normal.         STREP A +  Assessment/Plan:   Assessment    1. Sore throat  - POCT Rapid Strep A    2. Strep pharyngitis  - amoxicillin " (AMOXIL) 500 MG Cap; Take 1 capsule by mouth 2 times a day for 7 days.  Dispense: 14 capsule; Refill: 0  Most consistent with strep today and will treat accordingly.  Patient has approximately 7 days worth of antibiotics with her today.  We will send in another 7 days and told her to take for only take a total of 10 days.  Differential diagnosis, natural history, supportive care, and indications for immediate follow-up discussed.   Patient given instructions and understanding of medications and treatment.    If not improving in 3-5 days, F/U with PCP or return to UC if symptoms worsen.    Patient agreeable to plan.  Greater than 30 minutes were spent reviewing patient's chart, examining and obtaining history from patient, and discussing plan of care.     Please note that this dictation was created using voice recognition software. I have made every reasonable attempt to correct obvious errors, but I expect that there are errors of grammar and possibly content that I did not discover before finalizing the note.    Aditya Da Silva PA-C

## 2021-04-29 ENCOUNTER — NON-PROVIDER VISIT (OUTPATIENT)
Dept: CARDIOLOGY | Facility: MEDICAL CENTER | Age: 36
End: 2021-04-29
Attending: INTERNAL MEDICINE
Payer: COMMERCIAL

## 2021-04-29 ENCOUNTER — TELEPHONE (OUTPATIENT)
Dept: CARDIOLOGY | Facility: MEDICAL CENTER | Age: 36
End: 2021-04-29

## 2021-04-29 DIAGNOSIS — R00.0 SINUS TACHYCARDIA: ICD-10-CM

## 2021-04-29 DIAGNOSIS — R00.2 PALPITATIONS: ICD-10-CM

## 2021-04-29 DIAGNOSIS — I49.3 PVC (PREMATURE VENTRICULAR CONTRACTION): ICD-10-CM

## 2021-04-29 NOTE — TELEPHONE ENCOUNTER
Patient enrolled in the Zio XT Patch Holter monitoring program per Elizabeth Jackson MD.  In-Clinic hookup.    >Currently pending EOS.

## 2021-05-05 ENCOUNTER — HOSPITAL ENCOUNTER (OUTPATIENT)
Dept: CARDIOLOGY | Facility: MEDICAL CENTER | Age: 36
End: 2021-05-05
Attending: INTERNAL MEDICINE
Payer: COMMERCIAL

## 2021-05-05 DIAGNOSIS — R06.09 DOE (DYSPNEA ON EXERTION): ICD-10-CM

## 2021-05-05 DIAGNOSIS — U07.1 COVID-19 VIRUS INFECTION: ICD-10-CM

## 2021-05-05 DIAGNOSIS — R07.89 OTHER CHEST PAIN: ICD-10-CM

## 2021-05-05 LAB
LV EJECT FRACT  99904: 65
LV EJECT FRACT MOD 2C 99903: 56.1
LV EJECT FRACT MOD 4C 99902: 60.53
LV EJECT FRACT MOD BP 99901: 58.51

## 2021-05-05 PROCEDURE — 93306 TTE W/DOPPLER COMPLETE: CPT

## 2021-05-05 PROCEDURE — 93306 TTE W/DOPPLER COMPLETE: CPT | Mod: 26 | Performed by: INTERNAL MEDICINE

## 2021-05-19 ENCOUNTER — OFFICE VISIT (OUTPATIENT)
Dept: CARDIOLOGY | Facility: MEDICAL CENTER | Age: 36
End: 2021-05-19
Payer: COMMERCIAL

## 2021-05-19 VITALS
HEIGHT: 67 IN | HEART RATE: 78 BPM | BODY MASS INDEX: 27.15 KG/M2 | SYSTOLIC BLOOD PRESSURE: 110 MMHG | WEIGHT: 173 LBS | OXYGEN SATURATION: 97 % | RESPIRATION RATE: 12 BRPM | DIASTOLIC BLOOD PRESSURE: 68 MMHG

## 2021-05-19 DIAGNOSIS — R07.89 OTHER CHEST PAIN: ICD-10-CM

## 2021-05-19 DIAGNOSIS — U07.1 COVID-19 VIRUS INFECTION: ICD-10-CM

## 2021-05-19 DIAGNOSIS — R06.09 DOE (DYSPNEA ON EXERTION): ICD-10-CM

## 2021-05-19 DIAGNOSIS — I49.3 PVC (PREMATURE VENTRICULAR CONTRACTION): ICD-10-CM

## 2021-05-19 DIAGNOSIS — R00.2 PALPITATIONS: ICD-10-CM

## 2021-05-19 PROCEDURE — 99214 OFFICE O/P EST MOD 30 MIN: CPT | Performed by: NURSE PRACTITIONER

## 2021-05-19 RX ORDER — GABAPENTIN 100 MG/1
CAPSULE ORAL
COMMUNITY
Start: 2021-04-27 | End: 2021-06-30

## 2021-05-19 ASSESSMENT — ENCOUNTER SYMPTOMS
PND: 0
DIZZINESS: 0
VOMITING: 0
HEADACHES: 0
FEVER: 0
COUGH: 0
SHORTNESS OF BREATH: 0
SPUTUM PRODUCTION: 0
CLAUDICATION: 0
WHEEZING: 0
PALPITATIONS: 1
HEMOPTYSIS: 0
CHILLS: 0
NAUSEA: 0
ORTHOPNEA: 0

## 2021-05-19 ASSESSMENT — FIBROSIS 4 INDEX: FIB4 SCORE: 0.5

## 2021-05-19 NOTE — PROGRESS NOTES
Chief Complaint   Patient presents with   • Chest Pain     NP Dx: Other chest pain       Subjective:   Mckenzie Cash is a 35 y.o. female who presents today for chest pain and palpitations after COVID-19 infection.  Patient was last seen by Dr. Jackson 3/16/2021.    Since 3/16/2021 the patient has had echocardiogram 5/5/2021 indicating normal EF and no others specific abnormalities.  She is scheduled to have a Zio patch for further evaluation of her palpitations.  A [employer] message will be sent to the patient with results.  Ultimately she was on metoprolol 12.5 mg twice daily with some improvement to her palpitations.  She is now currently on 25 mg twice daily with significant improvement in minimal palpitations now.     Patient had Covid January 2020.  Originally treated for strep which happens a few times per year.  Only feels 70% herself.     Very slow to get better.  Noted shortness of breath at rest, chest pain and palpitations.  Sometimes lightheaded, sometimes heart is racing 130-140 bpm.  Chest tightness can happen at rest or activity.  Gets very fatigued also.  No syncope.  No symptoms of leg claudication.   No stroke/TIA like symptoms.     Reviewed emergency room record 3/13/2021, admitted for shortness of breath and palpitations.  Had a normal CT PE study 2/11/2021.  Also in the ED 1/20/2021.  HS trop normal.  No echo yet.     No prior hypertension.  No prior hyperlipidemia.  No prior smoking history.  No history of diabetes.  No history of autoimmune disease such as lupus or rheumatoid arthritis.  No chronic kidney disease.  No ETOH overuse.  No caffeine overuse. 0-3.  No recreation substance use.  No hx asthma.     Adopted, FH not know.     Lives in [place].  Licensed , echos, CT, ECG on dogs.  Born at [employer].  Has horses, not able to ride.  Has 8 and 6 year old.    Past Medical History:   Diagnosis Date   • Generalized headaches    • Other ovarian cyst, right side    • Pelvic pain  in female 10/02/2017    2/10   • Plantar fasciitis    • Tendonitis      Past Surgical History:   Procedure Laterality Date   • PELVISCOPY N/A 10/4/2017    Procedure: PELVISCOPY;  Surgeon: Eliza Carrizales M.D.;  Location: SURGERY [facility];  Service: Gynecology   • CARPAL TUNNEL RELEASE Right 01/2017   • LAPAROSCOPY  7/22/2011    Performed by ROSS VELAZQUEZ at SURGERY [facility]     Family History   Adopted: Yes     Social History     Socioeconomic History   • Marital status: Single     Spouse name: Not on file   • Number of children: Not on file   • Years of education: Not on file   • Highest education level: Not on file   Occupational History   • Not on file   Tobacco Use   • Smoking status: Never Smoker   • Smokeless tobacco: Never Used   Vaping Use   • Vaping Use: Never used   Substance and Sexual Activity   • Alcohol use: Not Currently     Comment: Rarely    • Drug use: No   • Sexual activity: Not on file   Other Topics Concern   • Not on file   Social History Narrative    ** Merged History Encounter **          Social Determinants of Health     Financial Resource Strain:    • Difficulty of Paying Living Expenses:    Food Insecurity:    • Worried About Running Out of Food in the Last Year:    • Ran Out of Food in the Last Year:    Transportation Needs:    • Lack of Transportation (Medical):    • Lack of Transportation (Non-Medical):    Physical Activity:    • Days of Exercise per Week:    • Minutes of Exercise per Session:    Stress:    • Feeling of Stress :    Social Connections:    • Frequency of Communication with Friends and Family:    • Frequency of Social Gatherings with Friends and Family:    • Attends [employer] Services:    • Active Member of Clubs or Organizations:    • Attends Club or Organization Meetings:    • Marital Status:    Intimate Partner Violence:    • Fear of Current or Ex-Partner:    • Emotionally Abused:    • Physically Abused:    • Sexually Abused:      Allergies  "  Allergen Reactions   • Nkda [No Known Drug Allergy]    • Other Misc      \"Monocryl sutures\" part of incision came out and got drainage. Doctor said could be reason     Outpatient Encounter Medications as of 5/19/2021   Medication Sig Dispense Refill   • metoprolol tartrate (LOPRESSOR) 25 MG Tab Take 0.5 Tablets by mouth 2 times a day. 180 tablet 3   • Levocetirizine Dihydrochloride (XYZAL) 5 MG Tab Take 5 mg by mouth 2 times a day as needed.     • levonorgestrel (MIRENA, 52 MG,) 20 MCG/24HR IUD 1 Each by Intrauterine route Once.       No facility-administered encounter medications on file as of 5/19/2021.     Review of Systems   Constitutional: Negative for chills and fever.   Respiratory: Negative for cough, hemoptysis, sputum production, shortness of breath and wheezing.    Cardiovascular: Positive for palpitations. Negative for chest pain, orthopnea, claudication, leg swelling and PND.   Gastrointestinal: Negative for nausea and vomiting.   Neurological: Negative for dizziness and headaches.   All other systems reviewed and are negative.       Objective:   /68 (BP Location: Left arm, Patient Position: Sitting, BP Cuff Size: Adult)   Pulse 78   Resp 12   Ht 1.702 m (5' 7\")   Wt 78.5 kg (173 lb)   SpO2 97%   BMI 27.10 kg/m²     Physical Exam   Constitutional: She appears well-developed.   Neck: No JVD present.   Cardiovascular: Normal rate, regular rhythm and normal heart sounds.   No murmur heard.  Pulmonary/Chest: Effort normal and breath sounds normal.   Abdominal: Soft.   Musculoskeletal:      Cervical back: Neck supple.   Neurological:   Cranial nerves II-XII WNL   Skin: Skin is warm and dry.   Psychiatric: Her behavior is normal. Judgment and thought content normal.   Nursing note and vitals reviewed.    Echocardiogram 5/5/2021  CONCLUSIONS  Normal echo.  Left ventricular ejection fraction is visually estimated to be 65%.  No prior study is available for comparison.     Assessment:     1. Other " chest pain     2. COVID-19 virus infection     3. CEDILLO (dyspnea on exertion)     4. Palpitations     5. PVC (premature ventricular contraction)         Medical Decision Making:  Today's Assessment / Status / Plan:   Patient will continue metoprolol 25 mg twice daily.  She will follow-up in 3 months.  When scheduled she will stop metoprolol to weeks prior so we can discuss whether or not she still requires metoprolol.      Follow-up visit in 3 months with myself.    Please note that this dictation was created using voice recognition software.  I have made every reasonable attempt to correct obvious errors, but it is possible there are errors of grammar or possibly content that I did not discover before finalizing the note.

## 2021-05-27 PROCEDURE — 93246 EXT ECG>7D<15D RECORDING: CPT | Performed by: INTERNAL MEDICINE

## 2021-05-27 PROCEDURE — 93248 EXT ECG>7D<15D REV&INTERPJ: CPT | Performed by: INTERNAL MEDICINE

## 2021-06-04 ENCOUNTER — OFFICE VISIT (OUTPATIENT)
Dept: URGENT CARE | Facility: PHYSICIAN GROUP | Age: 36
End: 2021-06-04
Payer: COMMERCIAL

## 2021-06-04 ENCOUNTER — HOSPITAL ENCOUNTER (OUTPATIENT)
Facility: MEDICAL CENTER | Age: 36
End: 2021-06-04
Attending: PHYSICIAN ASSISTANT
Payer: COMMERCIAL

## 2021-06-04 VITALS
SYSTOLIC BLOOD PRESSURE: 112 MMHG | RESPIRATION RATE: 18 BRPM | OXYGEN SATURATION: 97 % | DIASTOLIC BLOOD PRESSURE: 72 MMHG | TEMPERATURE: 98.1 F | WEIGHT: 170 LBS | BODY MASS INDEX: 26.68 KG/M2 | HEART RATE: 70 BPM | HEIGHT: 67 IN

## 2021-06-04 DIAGNOSIS — R35.0 URINARY FREQUENCY: ICD-10-CM

## 2021-06-04 DIAGNOSIS — R10.30 LOWER ABDOMINAL PAIN: ICD-10-CM

## 2021-06-04 LAB
APPEARANCE UR: NORMAL
BILIRUB UR STRIP-MCNC: NORMAL MG/DL
COLOR UR AUTO: NORMAL
GLUCOSE UR STRIP.AUTO-MCNC: NORMAL MG/DL
KETONES UR STRIP.AUTO-MCNC: NORMAL MG/DL
LEUKOCYTE ESTERASE UR QL STRIP.AUTO: NORMAL
NITRITE UR QL STRIP.AUTO: NORMAL
PH UR STRIP.AUTO: 5.5 [PH] (ref 5–8)
PROT UR QL STRIP: NORMAL MG/DL
RBC UR QL AUTO: NORMAL
SP GR UR STRIP.AUTO: 1.03
UROBILINOGEN UR STRIP-MCNC: 0.2 MG/DL

## 2021-06-04 PROCEDURE — 87510 GARDNER VAG DNA DIR PROBE: CPT

## 2021-06-04 PROCEDURE — 87480 CANDIDA DNA DIR PROBE: CPT

## 2021-06-04 PROCEDURE — 81002 URINALYSIS NONAUTO W/O SCOPE: CPT | Performed by: PHYSICIAN ASSISTANT

## 2021-06-04 PROCEDURE — 87660 TRICHOMONAS VAGIN DIR PROBE: CPT

## 2021-06-04 PROCEDURE — 99213 OFFICE O/P EST LOW 20 MIN: CPT | Performed by: PHYSICIAN ASSISTANT

## 2021-06-04 ASSESSMENT — ENCOUNTER SYMPTOMS
PALPITATIONS: 0
DIZZINESS: 0
CHILLS: 0
BLURRED VISION: 0
FEVER: 0
BACK PAIN: 1
VOMITING: 0
SHORTNESS OF BREATH: 0
FLANK PAIN: 0
NAUSEA: 0
ABDOMINAL PAIN: 1

## 2021-06-04 ASSESSMENT — FIBROSIS 4 INDEX: FIB4 SCORE: 0.5

## 2021-06-05 DIAGNOSIS — R10.30 LOWER ABDOMINAL PAIN: ICD-10-CM

## 2021-06-05 DIAGNOSIS — R35.0 URINARY FREQUENCY: ICD-10-CM

## 2021-06-05 NOTE — PROGRESS NOTES
"Subjective:      Mckenzie Cash is a 35 y.o. female who presents with Urinary Frequency (low back pain as well. started since wed. )      Urinary Frequency  This is a new problem. The current episode started in the past 7 days (started 2-3 days ago). The problem occurs constantly. The problem has been unchanged. Associated symptoms include abdominal pain and urinary symptoms (Urinary urgency/frequency.  Very mild \"bladder discomfort\" but no true burning with urination). Pertinent negatives include no chest pain, chills, fever, nausea or vomiting. Associated symptoms comments: Patient has noted some bilateral lower back pain over the past few days. Nothing aggravates the symptoms. She has tried drinking and NSAIDs for the symptoms. The treatment provided no relief.       Review of Systems   Constitutional: Negative for chills, fever and malaise/fatigue.   Eyes: Negative for blurred vision.   Respiratory: Negative for shortness of breath.    Cardiovascular: Negative for chest pain and palpitations.   Gastrointestinal: Positive for abdominal pain. Negative for nausea and vomiting.   Genitourinary: Positive for frequency and urgency. Negative for dysuria, flank pain and hematuria.   Musculoskeletal: Positive for back pain.   Neurological: Negative for dizziness.       PMH:  has a past medical history of Generalized headaches, Other ovarian cyst, right side, Pelvic pain in female (10/02/2017), Plantar fasciitis, and Tendonitis.  MEDS:   Current Outpatient Medications:   •  gabapentin (NEURONTIN) 100 MG Cap, , Disp: , Rfl:   •  metoprolol tartrate (LOPRESSOR) 25 MG Tab, Take 0.5 Tablets by mouth 2 times a day., Disp: 180 tablet, Rfl: 3  •  Levocetirizine Dihydrochloride (XYZAL) 5 MG Tab, Take 5 mg by mouth 2 times a day as needed., Disp: , Rfl:   •  levonorgestrel (MIRENA, 52 MG,) 20 MCG/24HR IUD, 1 Each by Intrauterine route Once., Disp: , Rfl:   ALLERGIES:   Allergies   Allergen Reactions   • Nkda [No Known " "Drug Allergy]    • Other Misc      \"Monocryl sutures\" part of incision came out and got drainage. Doctor said could be reason     SURGHX:   Past Surgical History:   Procedure Laterality Date   • PELVISCOPY N/A 10/4/2017    Procedure: PELVISCOPY;  Surgeon: Eliza Carrizales M.D.;  Location: SURGERY [facility];  Service: Gynecology   • CARPAL TUNNEL RELEASE Right 01/2017   • LAPAROSCOPY  7/22/2011    Performed by ROSS VELAZQUEZ at SURGERY [facility]     SOCHX:  reports that she has never smoked. She has never used smokeless tobacco. She reports previous alcohol use. She reports that she does not use drugs.  FH: Family history was reviewed, no pertinent findings to report     Objective:     /72   Pulse 70   Temp 36.7 °C (98.1 °F) (Temporal)   Resp 18   Ht 1.702 m (5' 7\")   Wt 77.1 kg (170 lb)   SpO2 97%   BMI 26.63 kg/m²      Physical Exam  Constitutional:       Appearance: Normal appearance. She is well-developed.   HENT:      Head: Normocephalic and atraumatic.      Right Ear: External ear normal.      Left Ear: External ear normal.   Eyes:      Conjunctiva/sclera: Conjunctivae normal.      Pupils: Pupils are equal, round, and reactive to light.   Cardiovascular:      Rate and Rhythm: Normal rate and regular rhythm.      Heart sounds: No murmur heard.     Pulmonary:      Effort: Pulmonary effort is normal.      Breath sounds: Normal breath sounds.   Abdominal:      Palpations: Abdomen is soft.      Tenderness: There is abdominal tenderness in the suprapubic area. There is no right CVA tenderness or left CVA tenderness.   Genitourinary:     Comments:  exam deferred.  Patient did self swab for vaginal pathogens DNA panel.  Musculoskeletal:      Lumbar back: Tenderness present. No bony tenderness.   Skin:     General: Skin is warm and dry.      Capillary Refill: Capillary refill takes less than 2 seconds.   Neurological:      Mental Status: She is alert and oriented to person, place, " and time.   Psychiatric:         Behavior: Behavior normal.         Judgment: Judgment normal.         POCT Urinalysis  Lab Results   Component Value Date/Time    POCCOLOR dark yellow 06/04/2021 06:19 PM    POCAPPEAR cloudy 06/04/2021 06:19 PM    POCLEUKEST neg 06/04/2021 06:19 PM    POCNITRITE neg 06/04/2021 06:19 PM    POCUROBILIGE 0.2 06/04/2021 06:19 PM    POCPROTEIN neg 06/04/2021 06:19 PM    POCURPH 5.5 06/04/2021 06:19 PM    POCBLOOD neg 06/04/2021 06:19 PM    POCSPGRV 1.030 06/04/2021 06:19 PM    POCKETONES neg 06/04/2021 06:19 PM    POCBILIRUBIN neg 06/04/2021 06:19 PM    POCGLUCUA neg 06/04/2021 06:19 PM             Assessment/Plan:     1. Urinary frequency  - VAGINAL PATHOGENS DNA PANEL; Future  - POCT Urinalysis    2. Lower abdominal pain  - VAGINAL PATHOGENS DNA PANEL; Future  - POCT Urinalysis      *Urinalysis is negative for infection.  Not even a trace amount of white blood cells or blood noted in the urine.  Discussed option of sending it for urine culture regardless just in case there is a false negative result but did discuss that insurance does not always cover it without a clear diagnosis of urinary tract infection.  Patient wished to hold off at this time.  We will obtain a vaginal pathogens DNA panel to rule out yeast or BV as an alternative cause of her symptoms.  If the swab comes back negative and symptoms persist in the next week she will need to return to the urgent care for reevaluation or follow-up with her PCP.            Differential Diagnosis, natural history, and supportive care discussed. Return to the Urgent Care or follow up with your PCP if symptoms fail to resolve, or for any new or worsening symptoms. Emergency room precautions discussed. Patient and/or family appears understanding of information.

## 2021-06-06 LAB
CANDIDA DNA VAG QL PROBE+SIG AMP: NEGATIVE
G VAGINALIS DNA VAG QL PROBE+SIG AMP: NEGATIVE
T VAGINALIS DNA VAG QL PROBE+SIG AMP: NEGATIVE

## 2021-06-30 ENCOUNTER — OFFICE VISIT (OUTPATIENT)
Dept: URGENT CARE | Facility: PHYSICIAN GROUP | Age: 36
End: 2021-06-30
Payer: COMMERCIAL

## 2021-06-30 VITALS
OXYGEN SATURATION: 97 % | TEMPERATURE: 98 F | RESPIRATION RATE: 18 BRPM | BODY MASS INDEX: 26.68 KG/M2 | WEIGHT: 170 LBS | HEART RATE: 78 BPM | SYSTOLIC BLOOD PRESSURE: 120 MMHG | DIASTOLIC BLOOD PRESSURE: 78 MMHG | HEIGHT: 67 IN

## 2021-06-30 DIAGNOSIS — J03.01 RECURRENT STREPTOCOCCAL TONSILLITIS: ICD-10-CM

## 2021-06-30 DIAGNOSIS — J02.0 PHARYNGITIS DUE TO STREPTOCOCCUS SPECIES: ICD-10-CM

## 2021-06-30 LAB
INT CON NEG: NORMAL
INT CON POS: NORMAL
S PYO AG THROAT QL: NORMAL

## 2021-06-30 PROCEDURE — 99213 OFFICE O/P EST LOW 20 MIN: CPT | Performed by: PHYSICIAN ASSISTANT

## 2021-06-30 PROCEDURE — 87880 STREP A ASSAY W/OPTIC: CPT | Performed by: PHYSICIAN ASSISTANT

## 2021-06-30 RX ORDER — AMOXICILLIN 500 MG/1
500 CAPSULE ORAL 2 TIMES DAILY
Qty: 20 CAPSULE | Refills: 0 | Status: SHIPPED | OUTPATIENT
Start: 2021-06-30 | End: 2021-07-10

## 2021-06-30 ASSESSMENT — ENCOUNTER SYMPTOMS
DIARRHEA: 0
SWOLLEN GLANDS: 1
SORE THROAT: 1
MYALGIAS: 0
PALPITATIONS: 0
CHILLS: 0
DIZZINESS: 1
BLURRED VISION: 0
SINUS PAIN: 0
HEADACHES: 1
ABDOMINAL PAIN: 0
COUGH: 0
VOMITING: 0
NAUSEA: 0
SHORTNESS OF BREATH: 0
EYE PAIN: 0
FEVER: 0
TROUBLE SWALLOWING: 1

## 2021-06-30 ASSESSMENT — FIBROSIS 4 INDEX: FIB4 SCORE: 0.52

## 2021-06-30 NOTE — LETTER
June 30, 2021         Patient: Mckenzie Cash   YOB: 1985   Date of Visit: 6/30/2021           To Whom it May Concern:    Mckenzie Cash was seen in my clinic on 6/30/2021. She may return to work on Friday, July 2, 2021.    If you have any questions or concerns, please don't hesitate to call.        Sincerely,           Sue Salguero P.A.-C.  Electronically Signed

## 2021-07-01 NOTE — PROGRESS NOTES
Subjective:      Mckenzie Cash is a 36 y.o. female who presents with Sore Throat (started earlier today, she started feling sick. she is feeling light headed. )      Pharyngitis   This is a new problem. The current episode started today. The problem has been unchanged. Neither side of throat is experiencing more pain than the other. There has been no fever. The pain is moderate. Associated symptoms include headaches, a plugged ear sensation, swollen glands and trouble swallowing. Pertinent negatives include no abdominal pain, congestion, coughing, diarrhea, drooling, ear pain, shortness of breath or vomiting. She has had no exposure to strep or mono. She has tried nothing for the symptoms.   Patient's third infection with strep pharyngitis this year.  Last year she had approximately 5 recurrences of strep pharyngitis.    Review of Systems   Constitutional: Positive for malaise/fatigue. Negative for chills and fever.   HENT: Positive for sore throat and trouble swallowing. Negative for congestion, drooling, ear pain and sinus pain.    Eyes: Negative for blurred vision and pain.   Respiratory: Negative for cough and shortness of breath.    Cardiovascular: Negative for chest pain and palpitations.   Gastrointestinal: Negative for abdominal pain, diarrhea, nausea and vomiting.   Musculoskeletal: Negative for myalgias.   Skin: Negative for rash.   Neurological: Positive for dizziness and headaches.       PMH:  has a past medical history of Generalized headaches, Other ovarian cyst, right side, Pelvic pain in female (10/02/2017), Plantar fasciitis, and Tendonitis.  MEDS:   Current Outpatient Medications:   •  amoxicillin (AMOXIL) 500 MG Cap, Take 1 capsule by mouth 2 times a day for 10 days., Disp: 20 capsule, Rfl: 0  •  metoprolol tartrate (LOPRESSOR) 25 MG Tab, Take 0.5 Tablets by mouth 2 times a day., Disp: 180 tablet, Rfl: 3  •  Levocetirizine Dihydrochloride (XYZAL) 5 MG Tab, Take 5 mg by mouth 2 times a  "day as needed., Disp: , Rfl:   ALLERGIES:   Allergies   Allergen Reactions   • Nkda [No Known Drug Allergy]    • Other Misc      \"Monocryl sutures\" part of incision came out and got drainage. Doctor said could be reason     SURGHX:   Past Surgical History:   Procedure Laterality Date   • PELVISCOPY N/A 10/4/2017    Procedure: PELVISCOPY;  Surgeon: Eliza Carrizales M.D.;  Location: SURGERY [facility];  Service: Gynecology   • CARPAL TUNNEL RELEASE Right 01/2017   • LAPAROSCOPY  7/22/2011    Performed by ROSS VELAZQUEZ at SURGERY [facility]     SOCHX:  reports that she has never smoked. She has never used smokeless tobacco. She reports previous alcohol use. She reports that she does not use drugs.  FH: Family history was reviewed, no pertinent findings to report     Objective:     /78   Pulse 78   Temp 36.7 °C (98 °F) (Temporal)   Resp 18   Ht 1.702 m (5' 7\")   Wt 77.1 kg (170 lb)   SpO2 97%   BMI 26.63 kg/m²      Physical Exam  Constitutional:       Appearance: She is well-developed.   HENT:      Head: Normocephalic and atraumatic.      Right Ear: Hearing, tympanic membrane, ear canal and external ear normal.      Left Ear: Hearing, tympanic membrane, ear canal and external ear normal.      Nose: Congestion present. No mucosal edema or rhinorrhea.      Mouth/Throat:      Lips: Pink.      Mouth: Mucous membranes are moist.      Pharynx: Uvula midline. Oropharyngeal exudate and posterior oropharyngeal erythema present. No uvula swelling.      Tonsils: 1+ on the right. 1+ on the left.   Eyes:      Conjunctiva/sclera: Conjunctivae normal.      Pupils: Pupils are equal, round, and reactive to light.   Cardiovascular:      Rate and Rhythm: Normal rate and regular rhythm.      Heart sounds: Normal heart sounds. No murmur heard.     Pulmonary:      Effort: Pulmonary effort is normal.      Breath sounds: Normal breath sounds. No wheezing.   Musculoskeletal:      Cervical back: Normal range of " motion.   Lymphadenopathy:      Cervical: Cervical adenopathy present.   Skin:     General: Skin is warm and dry.      Capillary Refill: Capillary refill takes less than 2 seconds.   Neurological:      Mental Status: She is alert and oriented to person, place, and time.   Psychiatric:         Behavior: Behavior normal.         Judgment: Judgment normal.         POCT Rapid Strep A - POSITIVE     Assessment/Plan:     1. Pharyngitis due to Streptococcus species  - amoxicillin (AMOXIL) 500 MG Cap; Take 1 capsule by mouth 2 times a day for 10 days.  Dispense: 20 capsule; Refill: 0  - POCT Rapid Strep A  - REFERRAL TO ENT  -Supportive care discussed to include salt water gargles, throat lozenges, and increased fluid intake    2. Recurrent streptococcal tonsillitis  - REFERRAL TO ENT            Differential Diagnosis, natural history, and supportive care discussed. Return to the Urgent Care or follow up with your PCP if symptoms fail to resolve, or for any new or worsening symptoms. Emergency room precautions discussed. Patient and/or family appears understanding of information.

## 2021-08-02 ENCOUNTER — OFFICE VISIT (OUTPATIENT)
Dept: URGENT CARE | Facility: PHYSICIAN GROUP | Age: 36
End: 2021-08-02
Payer: COMMERCIAL

## 2021-08-02 ENCOUNTER — HOSPITAL ENCOUNTER (OUTPATIENT)
Facility: MEDICAL CENTER | Age: 36
End: 2021-08-02
Attending: NURSE PRACTITIONER
Payer: COMMERCIAL

## 2021-08-02 VITALS
BODY MASS INDEX: 26.34 KG/M2 | DIASTOLIC BLOOD PRESSURE: 76 MMHG | OXYGEN SATURATION: 97 % | TEMPERATURE: 99.1 F | SYSTOLIC BLOOD PRESSURE: 104 MMHG | RESPIRATION RATE: 16 BRPM | HEART RATE: 91 BPM | WEIGHT: 167.8 LBS | HEIGHT: 67 IN

## 2021-08-02 DIAGNOSIS — J02.9 PHARYNGITIS, UNSPECIFIED ETIOLOGY: ICD-10-CM

## 2021-08-02 LAB
INT CON NEG: NORMAL
INT CON POS: NORMAL
S PYO AG THROAT QL: NEGATIVE

## 2021-08-02 PROCEDURE — 99213 OFFICE O/P EST LOW 20 MIN: CPT | Performed by: NURSE PRACTITIONER

## 2021-08-02 PROCEDURE — 87880 STREP A ASSAY W/OPTIC: CPT | Performed by: NURSE PRACTITIONER

## 2021-08-02 PROCEDURE — 87070 CULTURE OTHR SPECIMN AEROBIC: CPT

## 2021-08-02 ASSESSMENT — ENCOUNTER SYMPTOMS
HOARSE VOICE: 1
TROUBLE SWALLOWING: 1
HEADACHES: 1
FEVER: 0
VOMITING: 0
CHILLS: 0
DIARRHEA: 0
MYALGIAS: 0
ABDOMINAL PAIN: 0
COUGH: 0
SORE THROAT: 1
SWOLLEN GLANDS: 1

## 2021-08-02 ASSESSMENT — FIBROSIS 4 INDEX: FIB4 SCORE: 0.52

## 2021-08-03 DIAGNOSIS — J02.9 PHARYNGITIS, UNSPECIFIED ETIOLOGY: ICD-10-CM

## 2021-08-03 NOTE — PROGRESS NOTES
Subjective:     Mckenzie Cash is a 36 y.o. female who presents for Sore Throat (hx with strep 3x this year, light headed, painful swallowing, onset this morning)      Pharyngitis   This is a new problem. The current episode started today. The problem has been unchanged. Neither side of throat is experiencing more pain than the other. The pain is at a severity of 3/10. Associated symptoms include headaches, a hoarse voice, swollen glands and trouble swallowing. Pertinent negatives include no abdominal pain, congestion, coughing, diarrhea, ear pain or vomiting. She has tried nothing for the symptoms.   She notes that she has had multiple strep infections this year and is scheduled to have her tonsils out at the end of the month.  She declines Covid testing.  She states that she was exposed to strep throat by a coworker.      Review of Systems   Constitutional: Positive for malaise/fatigue. Negative for chills and fever.   HENT: Positive for hoarse voice, sore throat and trouble swallowing. Negative for congestion and ear pain.    Respiratory: Negative for cough.    Gastrointestinal: Negative for abdominal pain, diarrhea and vomiting.   Musculoskeletal: Negative for myalgias.   Neurological: Positive for headaches.       PMH:   Past Medical History:   Diagnosis Date   • Generalized headaches    • Other ovarian cyst, right side    • Pelvic pain in female 10/02/2017    2/10   • Plantar fasciitis    • Tendonitis      ALLERGIES: No Known Allergies  SURGHX:   Past Surgical History:   Procedure Laterality Date   • PELVISCOPY N/A 10/4/2017    Procedure: PELVISCOPY;  Surgeon: Eliza Carrizales M.D.;  Location: SURGERY [facility];  Service: Gynecology   • CARPAL TUNNEL RELEASE Right 01/2017   • LAPAROSCOPY  7/22/2011    Performed by ROSS VELAZQUEZ at [facility]     SOCHX:   Social History     Socioeconomic History   • Marital status: Single     Spouse name: Not on file   • Number of  "children: Not on file   • Years of education: Not on file   • Highest education level: Not on file   Occupational History   • Not on file   Tobacco Use   • Smoking status: Never Smoker   • Smokeless tobacco: Never Used   Vaping Use   • Vaping Use: Never used   Substance and Sexual Activity   • Alcohol use: Not Currently     Comment: Rarely    • Drug use: No   • Sexual activity: Not on file   Other Topics Concern   • Not on file   Social History Narrative    ** Merged History Encounter **          Social Determinants of Health     Financial Resource Strain:    • Difficulty of Paying Living Expenses:    Food Insecurity:    • Worried About Running Out of Food in the Last Year:    • Ran Out of Food in the Last Year:    Transportation Needs:    • Lack of Transportation (Medical):    • Lack of Transportation (Non-Medical):    Physical Activity:    • Days of Exercise per Week:    • Minutes of Exercise per Session:    Stress:    • Feeling of Stress :    Social Connections:    • Frequency of Communication with Friends and Family:    • Frequency of Social Gatherings with Friends and Family:    • Attends [employer] Services:    • Active Member of Clubs or Organizations:    • Attends Club or Organization Meetings:    • Marital Status:    Intimate Partner Violence:    • Fear of Current or Ex-Partner:    • Emotionally Abused:    • Physically Abused:    • Sexually Abused:      FH:   Family History   Adopted: Yes         Objective:   /76 (BP Location: Left arm, Patient Position: Sitting, BP Cuff Size: Adult)   Pulse 91   Temp 37.3 °C (99.1 °F) (Temporal)   Resp 16   Ht 1.702 m (5' 7\")   Wt 76.1 kg (167 lb 12.8 oz)   SpO2 97%   BMI 26.28 kg/m²     Physical Exam  Vitals and nursing note reviewed.   Constitutional:       General: She is not in acute distress.     Appearance: Normal appearance. She is not ill-appearing.   HENT:      Head: Normocephalic and atraumatic.      Right Ear: External ear normal. There is no " impacted cerumen.      Left Ear: External ear normal. There is no impacted cerumen.      Nose: No congestion or rhinorrhea.      Mouth/Throat:      Mouth: Mucous membranes are moist.   Eyes:      Extraocular Movements: Extraocular movements intact.      Pupils: Pupils are equal, round, and reactive to light.   Cardiovascular:      Rate and Rhythm: Normal rate and regular rhythm.      Pulses: Normal pulses.      Heart sounds: Normal heart sounds.   Pulmonary:      Effort: Pulmonary effort is normal.      Breath sounds: Normal breath sounds.   Abdominal:      General: Abdomen is flat. Bowel sounds are normal.      Palpations: Abdomen is soft.      Tenderness: There is no abdominal tenderness. There is no right CVA tenderness or left CVA tenderness.   Musculoskeletal:         General: Normal range of motion.      Cervical back: Normal range of motion and neck supple.   Skin:     General: Skin is warm and dry.      Capillary Refill: Capillary refill takes less than 2 seconds.   Neurological:      General: No focal deficit present.      Mental Status: She is alert and oriented to person, place, and time. Mental status is at baseline.   Psychiatric:         Mood and Affect: Mood normal.         Behavior: Behavior normal.         Thought Content: Thought content normal.         Judgment: Judgment normal.       POCT strep: negative  Assessment/Plan:   Assessment    1. Pharyngitis, unspecified etiology  CULTURE THROAT    POCT Rapid Strep A     We discussed supportive measures including humidifier, warm salt water gargles, over-the-counter Cepacol throat lozenges, rest  and increased fluids. Pt was encouraged to seek treatment back in the ER or urgent care for worsening symptoms,  fever greater than 100.5, wheezes or shortness of breath.    AVS handout given and reviewed with patient. Pt educated on red flags and when to seek treatment back in ER or UC.

## 2021-08-05 LAB
BACTERIA SPEC RESP CULT: NORMAL
SIGNIFICANT IND 70042: NORMAL
SITE SITE: NORMAL
SOURCE SOURCE: NORMAL

## 2021-08-20 ENCOUNTER — HOSPITAL ENCOUNTER (OUTPATIENT)
Facility: MEDICAL CENTER | Age: 36
End: 2021-08-20
Attending: ANESTHESIOLOGY
Payer: COMMERCIAL

## 2021-08-20 LAB — COVID ORDER STATUS COVID19: NORMAL

## 2021-08-20 PROCEDURE — U0005 INFEC AGEN DETEC AMPLI PROBE: HCPCS

## 2021-08-20 PROCEDURE — U0003 INFECTIOUS AGENT DETECTION BY NUCLEIC ACID (DNA OR RNA); SEVERE ACUTE RESPIRATORY SYNDROME CORONAVIRUS 2 (SARS-COV-2) (CORONAVIRUS DISEASE [COVID-19]), AMPLIFIED PROBE TECHNIQUE, MAKING USE OF HIGH THROUGHPUT TECHNOLOGIES AS DESCRIBED BY CMS-2020-01-R: HCPCS

## 2021-08-22 LAB
SARS-COV-2 RNA RESP QL NAA+PROBE: NOTDETECTED
SPECIMEN SOURCE: NORMAL

## 2021-09-08 ENCOUNTER — OFFICE VISIT (OUTPATIENT)
Dept: CARDIOLOGY | Facility: MEDICAL CENTER | Age: 36
End: 2021-09-08
Payer: COMMERCIAL

## 2021-09-08 VITALS
OXYGEN SATURATION: 96 % | WEIGHT: 161.2 LBS | HEIGHT: 67 IN | BODY MASS INDEX: 25.3 KG/M2 | DIASTOLIC BLOOD PRESSURE: 68 MMHG | RESPIRATION RATE: 16 BRPM | SYSTOLIC BLOOD PRESSURE: 110 MMHG | HEART RATE: 110 BPM

## 2021-09-08 DIAGNOSIS — R00.2 PALPITATIONS: ICD-10-CM

## 2021-09-08 DIAGNOSIS — R07.89 OTHER CHEST PAIN: ICD-10-CM

## 2021-09-08 DIAGNOSIS — R06.09 DOE (DYSPNEA ON EXERTION): ICD-10-CM

## 2021-09-08 DIAGNOSIS — R00.0 SINUS TACHYCARDIA: ICD-10-CM

## 2021-09-08 DIAGNOSIS — U07.1 COVID-19 VIRUS INFECTION: ICD-10-CM

## 2021-09-08 DIAGNOSIS — I49.3 PVC (PREMATURE VENTRICULAR CONTRACTION): ICD-10-CM

## 2021-09-08 PROCEDURE — 99214 OFFICE O/P EST MOD 30 MIN: CPT | Performed by: NURSE PRACTITIONER

## 2021-09-08 RX ORDER — HYDROCODONE BITARTRATE AND ACETAMINOPHEN 7.5; 325 MG/1; MG/1
TABLET ORAL
COMMUNITY
Start: 2021-09-01 | End: 2022-03-07

## 2021-09-08 RX ORDER — ONDANSETRON 4 MG/1
TABLET, ORALLY DISINTEGRATING ORAL
COMMUNITY
Start: 2021-08-26 | End: 2022-03-07

## 2021-09-08 ASSESSMENT — ENCOUNTER SYMPTOMS
NAUSEA: 0
ORTHOPNEA: 0
CHILLS: 0
SPUTUM PRODUCTION: 0
HEADACHES: 0
COUGH: 0
SHORTNESS OF BREATH: 0
CLAUDICATION: 0
DIZZINESS: 0
FEVER: 0
PND: 0
HEMOPTYSIS: 0
VOMITING: 0
WHEEZING: 0
PALPITATIONS: 0

## 2021-09-08 ASSESSMENT — FIBROSIS 4 INDEX: FIB4 SCORE: 0.52

## 2021-09-08 NOTE — PROGRESS NOTES
Chief Complaint   Patient presents with   • Chest Pain     F/V Dx: Other chest pain        Subjective     Mckenzie Cash is a 36 y.o. female who presents today for chest pain and palpitations after COVID-19 infection.  Patient was last seen by Dr. Jackson 3/16/2021 and myself 5/19/2021.    Since 5/19/2021, the patient has been doing well from a cardiovascular standpoint.  She has forgotten to take her metoprolol on a few occasions and definitely notices an increase in palpitations and associated shortness of breath.  When she is taking metoprolol consistently she notices the PVCs are much more random and have been less often.  Patient recently had tonsillectomy.  She is recovering well but it is quite painful.     Since 3/16/2021 the patient has had echocardiogram 5/5/2021 indicating normal EF and no others specific abnormalities.  She is scheduled to have a Zio patch for further evaluation of her palpitations.  A [employer] message will be sent to the patient with results.  Ultimately she was on metoprolol 12.5 mg twice daily with some improvement to her palpitations.  She is now currently on 25 mg twice daily with significant improvement in minimal palpitations now.     Patient had Covid January 2020.  Originally treated for strep which happens a few times per year.  Only feels 70% herself.     Very slow to get better.  Noted shortness of breath at rest, chest pain and palpitations.  Sometimes lightheaded, sometimes heart is racing 130-140 bpm.  Chest tightness can happen at rest or activity.  Gets very fatigued also.  No syncope.  No symptoms of leg claudication.   No stroke/TIA like symptoms.     Reviewed emergency room record 3/13/2021, admitted for shortness of breath and palpitations.  Had a normal CT PE study 2/11/2021.  Also in the ED 1/20/2021.  HS trop normal.  No echo yet.     No prior hypertension.  No prior hyperlipidemia.  No prior smoking history.  No history of diabetes.  No history of  autoimmune disease such as lupus or rheumatoid arthritis.  No chronic kidney disease.  No ETOH overuse.  No caffeine overuse. 0-3.  No recreation substance use.  No hx asthma.     Adopted, FH not know.     Lives in [place].  Licensed , echos, CT, ECG on dogs.  Born at [facility].  Has horses, not able to ride.  Has 8 and 6 year old.    Past Medical History:   Diagnosis Date   • Generalized headaches    • Other ovarian cyst, right side    • Pelvic pain in female 10/02/2017    2/10   • Plantar fasciitis    • Tendonitis      Past Surgical History:   Procedure Laterality Date   • PELVISCOPY N/A 10/4/2017    Procedure: PELVISCOPY;  Surgeon: Eliza Carrizales M.D.;  Location: SURGERY [facility];  Service: Gynecology   • CARPAL TUNNEL RELEASE Right 01/2017   • LAPAROSCOPY  7/22/2011    Performed by ROSS VELAZQUEZ at SURGERY [facility]     Family History   Adopted: Yes     Social History     Socioeconomic History   • Marital status: Single     Spouse name: Not on file   • Number of children: Not on file   • Years of education: Not on file   • Highest education level: Not on file   Occupational History   • Not on file   Tobacco Use   • Smoking status: Never Smoker   • Smokeless tobacco: Never Used   Vaping Use   • Vaping Use: Never used   Substance and Sexual Activity   • Alcohol use: Not Currently     Comment: Rarely    • Drug use: No   • Sexual activity: Not on file   Other Topics Concern   • Not on file   Social History Narrative    ** Merged History Encounter **          Social Determinants of Health     Financial Resource Strain:    • Difficulty of Paying Living Expenses:    Food Insecurity:    • Worried About Running Out of Food in the Last Year:    • Ran Out of Food in the Last Year:    Transportation Needs:    • Lack of Transportation (Medical):    • Lack of Transportation (Non-Medical):    Physical Activity:    • Days of Exercise per Week:    • Minutes of Exercise per Session:    Stress:    •  "Feeling of Stress :    Social Connections:    • Frequency of Communication with Friends and Family:    • Frequency of Social Gatherings with Friends and Family:    • Attends [employer] Services:    • Active Member of Clubs or Organizations:    • Attends Club or Organization Meetings:    • Marital Status:    Intimate Partner Violence:    • Fear of Current or Ex-Partner:    • Emotionally Abused:    • Physically Abused:    • Sexually Abused:      No Known Allergies  Outpatient Encounter Medications as of 9/8/2021   Medication Sig Dispense Refill   • HYDROcodone-acetaminophen (NORCO) 7.5-325 MG per tablet TAKE 1 TO 2 TABLETS BY MOUTH EVERY 6 HOURS AS NEEDED FOR PAIN FOR 7 DAYS     • ondansetron (ZOFRAN ODT) 4 MG TABLET DISPERSIBLE DISSOLVE 1 TABLET IN MOUTH EVERY 6 HOURS AS NEEDED FOR NAUSEA AND VOMITING     • metoprolol tartrate (LOPRESSOR) 25 MG Tab Take 1 Tablet by mouth 2 times a day. 180 Tablet 3   • Levocetirizine Dihydrochloride (XYZAL) 5 MG Tab Take 5 mg by mouth 2 times a day as needed.     • [DISCONTINUED] metoprolol tartrate (LOPRESSOR) 25 MG Tab Take 0.5 Tablets by mouth 2 times a day. 180 tablet 3     No facility-administered encounter medications on file as of 9/8/2021.     Review of Systems   Constitutional: Negative for chills and fever.   Respiratory: Negative for cough, hemoptysis, sputum production, shortness of breath and wheezing.    Cardiovascular: Negative for chest pain, palpitations, orthopnea, claudication, leg swelling and PND.   Gastrointestinal: Negative for nausea and vomiting.   Neurological: Negative for dizziness and headaches.   All other systems reviewed and are negative.             Objective     /68 (BP Location: Left arm, Patient Position: Sitting, BP Cuff Size: Adult)   Pulse (!) 110   Resp 16   Ht 1.702 m (5' 7\")   Wt 73.1 kg (161 lb 3.2 oz)   SpO2 96%   BMI 25.25 kg/m²     Physical Exam  Vitals and nursing note reviewed.   Constitutional:       Appearance: She is " well-developed.   Neck:      Vascular: No JVD.   Cardiovascular:      Rate and Rhythm: Normal rate and regular rhythm.      Heart sounds: Normal heart sounds. No murmur heard.     Pulmonary:      Effort: Pulmonary effort is normal.      Breath sounds: Normal breath sounds.   Abdominal:      Palpations: Abdomen is soft.   Musculoskeletal:      Cervical back: Neck supple.   Skin:     General: Skin is warm and dry.   Neurological:      Comments: Cranial nerves II-XII WNL   Psychiatric:         Behavior: Behavior normal.         Thought Content: Thought content normal.         Judgment: Judgment normal.       Echocardiogram 5/5/2021  CONCLUSIONS  Normal echo.  Left ventricular ejection fraction is visually estimated to be 65%.  No prior study is available for comparison.          Assessment & Plan     1. COVID-19 virus infection     2. PVC (premature ventricular contraction)     3. Palpitations     4. CEDILLO (dyspnea on exertion)     5. Sinus tachycardia     6. Other chest pain         Medical Decision Making: Today's Assessment/Status/Plan:   Continue metoprolol 25 mg twice daily.  Patient knows that should her palpitations subside she can cut her metoprolol in half and continue twice daily and then eventually half daily and eventually quit altogether.    She will follow-up in approximately 6 months and hopefully at that time be off of metoprolol altogether.  Covid was diagnosed January 2021.

## 2021-12-30 ENCOUNTER — PATIENT MESSAGE (OUTPATIENT)
Dept: CARDIOLOGY | Facility: MEDICAL CENTER | Age: 36
End: 2021-12-30

## 2022-01-03 ENCOUNTER — PATIENT MESSAGE (OUTPATIENT)
Dept: CARDIOLOGY | Facility: MEDICAL CENTER | Age: 37
End: 2022-01-03

## 2022-01-03 NOTE — PATIENT COMMUNICATION
Spoke to [facility] pharmacist who states Metoprolol RX was never received. He advises that pharmacy has only ever filled antibiotics for patient a handful of times.     Cervalishart message sent to patient for clarification.

## 2022-01-14 ENCOUNTER — OFFICE VISIT (OUTPATIENT)
Dept: URGENT CARE | Facility: PHYSICIAN GROUP | Age: 37
End: 2022-01-14
Payer: COMMERCIAL

## 2022-01-14 ENCOUNTER — HOSPITAL ENCOUNTER (OUTPATIENT)
Facility: MEDICAL CENTER | Age: 37
End: 2022-01-14
Attending: NURSE PRACTITIONER
Payer: COMMERCIAL

## 2022-01-14 VITALS
SYSTOLIC BLOOD PRESSURE: 108 MMHG | TEMPERATURE: 98.5 F | BODY MASS INDEX: 24.8 KG/M2 | RESPIRATION RATE: 16 BRPM | OXYGEN SATURATION: 97 % | HEIGHT: 67 IN | WEIGHT: 158 LBS | DIASTOLIC BLOOD PRESSURE: 72 MMHG | HEART RATE: 86 BPM

## 2022-01-14 DIAGNOSIS — R09.81 NASAL CONGESTION WITH RHINORRHEA: ICD-10-CM

## 2022-01-14 DIAGNOSIS — R51.9 ACUTE NONINTRACTABLE HEADACHE, UNSPECIFIED HEADACHE TYPE: ICD-10-CM

## 2022-01-14 DIAGNOSIS — J02.9 SORE THROAT: ICD-10-CM

## 2022-01-14 DIAGNOSIS — J34.89 NASAL CONGESTION WITH RHINORRHEA: ICD-10-CM

## 2022-01-14 LAB — COVID ORDER STATUS COVID19: NORMAL

## 2022-01-14 PROCEDURE — 87070 CULTURE OTHR SPECIMN AEROBIC: CPT

## 2022-01-14 PROCEDURE — 99213 OFFICE O/P EST LOW 20 MIN: CPT | Performed by: NURSE PRACTITIONER

## 2022-01-14 PROCEDURE — U0003 INFECTIOUS AGENT DETECTION BY NUCLEIC ACID (DNA OR RNA); SEVERE ACUTE RESPIRATORY SYNDROME CORONAVIRUS 2 (SARS-COV-2) (CORONAVIRUS DISEASE [COVID-19]), AMPLIFIED PROBE TECHNIQUE, MAKING USE OF HIGH THROUGHPUT TECHNOLOGIES AS DESCRIBED BY CMS-2020-01-R: HCPCS

## 2022-01-14 PROCEDURE — U0005 INFEC AGEN DETEC AMPLI PROBE: HCPCS

## 2022-01-14 ASSESSMENT — FIBROSIS 4 INDEX: FIB4 SCORE: 0.52

## 2022-01-14 ASSESSMENT — ENCOUNTER SYMPTOMS
FEVER: 1
SORE THROAT: 1
CARDIOVASCULAR NEGATIVE: 1
CHILLS: 0
GASTROINTESTINAL NEGATIVE: 1
MYALGIAS: 1
HEADACHES: 1
COUGH: 1

## 2022-01-14 NOTE — PROGRESS NOTES
Subjective     Mckenzie Cahs is a 36 y.o. female who presents with Pharyngitis (Congestion, headache and sore throat x 1 day)            HPI  States nasal congestion, headache, sore throat since yesterday. 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 Taking over the counter Vaishnavi Mattoon flu/cold, Ibuprofen started last night.      PMH:  has a past medical history of Generalized headaches, Other ovarian cyst, right side, Pelvic pain in female (10/02/2017), Plantar fasciitis, and Tendonitis.  MEDS:   Current Outpatient Medications:   •  metoprolol tartrate (LOPRESSOR) 25 MG Tab, Take 1 Tablet by mouth 2 times a day., Disp: 180 Tablet, Rfl: 3  •  HYDROcodone-acetaminophen (NORCO) 7.5-325 MG per tablet, TAKE 1 TO 2 TABLETS BY MOUTH EVERY 6 HOURS AS NEEDED FOR PAIN FOR 7 DAYS (Patient not taking: Reported on 1/14/2022), Disp: , Rfl:   •  ondansetron (ZOFRAN ODT) 4 MG TABLET DISPERSIBLE, DISSOLVE 1 TABLET IN MOUTH EVERY 6 HOURS AS NEEDED FOR NAUSEA AND VOMITING (Patient not taking: Reported on 1/14/2022), Disp: , Rfl:   •  Levocetirizine Dihydrochloride (XYZAL) 5 MG Tab, Take 5 mg by mouth 2 times a day as needed. (Patient not taking: Reported on 1/14/2022), Disp: , Rfl:   ALLERGIES: No Known Allergies  SURGHX:   Past Surgical History:   Procedure Laterality Date   • PELVISCOPY N/A 10/4/2017    Procedure: PELVISCOPY;  Surgeon: Eliza Carrizales M.D.;  Location: SURGERY [facility];  Service: Gynecology   • CARPAL TUNNEL RELEASE Right 01/2017   • LAPAROSCOPY  7/22/2011    Performed by ROSS VELAZQUEZ at SURGERY [facility]     SOCHX:  reports that she has never smoked. She has never used smokeless tobacco. She reports previous alcohol use. She reports that she does not use drugs.  FH: Family history was reviewed, no pertinent findings to report    Review of Systems   Constitutional: Positive for fever and malaise/fatigue. Negative for chills.   HENT: Positive for  "sore throat. Negative for congestion.    Respiratory: Positive for cough.    Cardiovascular: Negative.    Gastrointestinal: Negative.    Musculoskeletal: Positive for myalgias.   Neurological: Positive for headaches.              Objective     /72   Pulse 86   Temp 36.9 °C (98.5 °F) (Temporal)   Resp 16   Ht 1.702 m (5' 7\")   Wt 71.7 kg (158 lb)   SpO2 97%   BMI 24.75 kg/m²      Physical Exam  Vitals reviewed.   Constitutional:       General: She is not in acute distress.     Appearance: Normal appearance. She is well-developed. She is not ill-appearing, toxic-appearing or diaphoretic.   HENT:      Head: Normocephalic.      Right Ear: Ear canal and external ear normal. A middle ear effusion is present.      Left Ear: Ear canal and external ear normal. A middle ear effusion is present.      Nose: Congestion and rhinorrhea present.      Mouth/Throat:      Lips: Pink.      Mouth: Mucous membranes are dry.      Pharynx: Uvula midline. Posterior oropharyngeal erythema present. No pharyngeal swelling, oropharyngeal exudate or uvula swelling.      Tonsils: 0 on the right. 0 on the left.   Eyes:      Conjunctiva/sclera: Conjunctivae normal.      Pupils: Pupils are equal, round, and reactive to light.   Cardiovascular:      Rate and Rhythm: Normal rate.   Pulmonary:      Effort: Pulmonary effort is normal.      Breath sounds: Normal breath sounds and air entry. No stridor, decreased air movement or transmitted upper airway sounds. No decreased breath sounds, wheezing, rhonchi or rales.   Musculoskeletal:         General: Normal range of motion.      Cervical back: Normal range of motion and neck supple.   Skin:     General: Skin is warm and dry.   Neurological:      Mental Status: She is alert and oriented to person, place, and time.   Psychiatric:         Attention and Perception: Attention normal.         Mood and Affect: Mood normal.         Speech: Speech normal.         Behavior: Behavior normal. Behavior " is cooperative.                             Assessment & Plan        1. Nasal congestion with rhinorrhea    - SARS-CoV-2 PCR (24 hour In-House): Collect NP swab in VTM; Future    2. Acute nonintractable headache, unspecified headache type    - SARS-CoV-2 PCR (24 hour In-House): Collect NP swab in VTM; Future    3. Sore throat    - SARS-CoV-2 PCR (24 hour In-House): Collect NP swab in VTM; Future  - CULTURE THROAT; Future     -Stay home isolated from others until COVID test resulted the follow CDC guidelines for positive cases as discussed  -Increase water intake  -May use over the counter Tylenol/Ibuprofen as needed for fever or body aches  -Get rest  -Salt water gargle as needed for any sore throat  -May use over the counter Flonase, saline nasal spray as needed for any nasal congestion  -May use over the counter cough suppressant medications like plain Robitussin/Delsym as needed   -Monitor for fevers, cough, shortness of breath, chest pain, chest tightness, lethargy- need re-evaluation  -MyChart release for COVID/Throat Culture result, may take up to 48-72 hrs for result, patient notified

## 2022-01-15 ENCOUNTER — PATIENT MESSAGE (OUTPATIENT)
Dept: URGENT CARE | Facility: PHYSICIAN GROUP | Age: 37
End: 2022-01-15

## 2022-01-15 LAB
SARS-COV-2 RNA RESP QL NAA+PROBE: NOTDETECTED
SPECIMEN SOURCE: NORMAL

## 2022-01-15 NOTE — PROGRESS NOTES
Called patient regarding whether to retest for PCR COVID due to negative PCR then positive at home after. Vaccinatred with one dose of J & J last month. Symptoms started 3 days ago. Advised to retest on day 5 with PCR. Patient agrees to do this on Monday 1/17/2022.

## 2022-03-07 ENCOUNTER — OFFICE VISIT (OUTPATIENT)
Dept: CARDIOLOGY | Facility: MEDICAL CENTER | Age: 37
End: 2022-03-07
Payer: COMMERCIAL

## 2022-03-07 VITALS
HEART RATE: 65 BPM | OXYGEN SATURATION: 98 % | DIASTOLIC BLOOD PRESSURE: 78 MMHG | HEIGHT: 67 IN | SYSTOLIC BLOOD PRESSURE: 102 MMHG | RESPIRATION RATE: 16 BRPM | BODY MASS INDEX: 24.88 KG/M2 | WEIGHT: 158.5 LBS

## 2022-03-07 DIAGNOSIS — R00.2 PALPITATIONS: ICD-10-CM

## 2022-03-07 DIAGNOSIS — R00.0 SINUS TACHYCARDIA: ICD-10-CM

## 2022-03-07 DIAGNOSIS — U09.9 COVID-19 LONG HAULER: ICD-10-CM

## 2022-03-07 DIAGNOSIS — R06.09 DOE (DYSPNEA ON EXERTION): ICD-10-CM

## 2022-03-07 DIAGNOSIS — U07.1 COVID-19 VIRUS INFECTION: ICD-10-CM

## 2022-03-07 DIAGNOSIS — R07.89 OTHER CHEST PAIN: ICD-10-CM

## 2022-03-07 DIAGNOSIS — I49.3 PVC (PREMATURE VENTRICULAR CONTRACTION): ICD-10-CM

## 2022-03-07 PROCEDURE — 99214 OFFICE O/P EST MOD 30 MIN: CPT | Mod: CS | Performed by: INTERNAL MEDICINE

## 2022-03-07 RX ORDER — DOXYCYCLINE HYCLATE 100 MG
100 TABLET ORAL 2 TIMES DAILY
COMMUNITY
Start: 2022-02-05 | End: 2022-03-07

## 2022-03-07 ASSESSMENT — FIBROSIS 4 INDEX: FIB4 SCORE: 0.52

## 2022-03-07 NOTE — LETTER
[facility]   [address], [address]  [place] [address]  Phone: [phone]  Fax: [phone]              Mckenzie Cash  1985    Encounter Date: 3/7/2022    Elizabeth Jackson M.D.          PROGRESS NOTE:  Subjective:   Chief Complaint:   Chief Complaint   Patient presents with   • Chest Pain       Mckenzie Cash is a 36 y.o. 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    Patient had Covid January 2020.  Originally treated for strep which happens a few times per year.  Prior was 70% back to her baseline, now 95%.  Very slow to get better.  Noted shortness of breath at rest, chest pain and palpitations.  Sometimes lightheaded, sometimes heart is racing 130-140 bpm.  Chest tightness can happen at rest or activity.  Gets very fatigued also.  Reviewed emergency room record 3/13/2021, admitted for shortness of breath and palpitations.  Had a normal CT PE study 2/11/2021.  Also in the ED 1/20/2021.  HS trop normal.  Echo normal.    Zio patch heart monitor showing times that her heart rate was in the 130s just walking around her home with symptoms.  Also some PVCs which she can feel at rest.    Did get vaccinated.  Tested positive at home again, same sx but more mild.    We started metoprolol and she is doing better.   All heart does not goes quickly, fewer PVCs and more stamina.  On rare occasion, when she misses metoprolol she definitely notices the difference.    Not limited by chest pain, pressure or tightness with activity.   No significant palpitations, lightheadedness, or presyncope/syncope.   No symptoms of leg claudication.   No stroke/TIA like symptoms.    No prior hypertension.  No prior hyperlipidemia.  No prior smoking history.  No history of diabetes.  No history of autoimmune disease such as lupus or rheumatoid arthritis.  No chronic kidney  disease.  No ETOH overuse.  No caffeine overuse. 0-3.  No recreation substance use.  No hx asthma.    Adopted, FH not know.    Lives in House.  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  Born at [facility] [employer].  Has horses, dogs, etc, very busy at home.  Has 9 and 7 year old.    DATA REVIEWED by me:  ECG (my personal interpretation) 3/13/2021  Sinus, 82, normal    ZioPatch Summary: 5-27-21  1. Sinus rhythm with appropriate heart rate range. Average 85, range 48 to 164 bpm.   2. Normal sinus node and AV node conduction normal. No pauses.   3. Rare PACs.   4. Rare PVCs.   5. No sustained rhythm changes. No atrial fibrillation/flutter.   6. 11 patient triggers during sinus, rate  bpm, once with a PVC, symptoms reported include short of breath while walking in the house,  bpm, also skipped/irregular beats/fluttering/racing.     Conclusion: Sinus rhythm. Symptoms during normal rhythm, sinus tachycardia and once with a PVC, consider inappropriate sinus tachycardia.     Echo 5-5-21  Normal echo.  Left ventricular ejection fraction is visually estimated to be 65%.     No prior study is available for comparison.     CTA 2/11/2021  Normal    Most recent labs:       Lab Results   Component Value Date/Time    WBC 9.0 03/13/2021 01:30 PM    HEMOGLOBIN 14.6 03/13/2021 01:30 PM    HEMATOCRIT 43.3 03/13/2021 01:30 PM    MCV 91.0 03/13/2021 01:30 PM    INR 1.15 (H) 03/06/2009 08:45 PM      Lab Results   Component Value Date/Time    SODIUM 138 03/13/2021 01:30 PM    POTASSIUM 4.2 03/13/2021 01:30 PM    CHLORIDE 108 03/13/2021 01:30 PM    CO2 21 03/13/2021 01:30 PM    GLUCOSE 82 03/13/2021 01:30 PM    BUN 9 03/13/2021 01:30 PM    CREATININE 0.80 03/13/2021 01:30 PM    CREATININE 0.9 03/13/2009 01:55 AM      Lab Results   Component Value Date/Time    ASTSGOT 13 03/13/2021 01:30 PM    ALTSGPT 13 03/13/2021 01:30 PM    ALBUMIN 4.1 03/13/2021 01:30 PM      No results found for: CHOLSTRLTOT, LDL,  HDL, TRIGLYCERIDE  No results for input(s): NTPROBNP, TROPONINT in the last 72 hours.      Past Medical History:   Diagnosis Date   • Generalized headaches    • Other ovarian cyst, right side    • Pelvic pain in female 10/02/2017    2/10   • Plantar fasciitis    • Tendonitis      Past Surgical History:   Procedure Laterality Date   • PELVISCOPY N/A 10/4/2017    Procedure: PELVISCOPY;  Surgeon: Eliza Carrizales M.D.;  Location: SURGERY [facility];  Service: Gynecology   • CARPAL TUNNEL RELEASE Right 01/2017   • LAPAROSCOPY  7/22/2011    Performed by ROSS VELAZQUEZ at SURGERY [facility]     Family History   Adopted: Yes     Social History     Socioeconomic History   • Marital status: Single     Spouse name: Not on file   • Number of children: Not on file   • Years of education: Not on file   • Highest education level: Not on file   Occupational History   • Not on file   Tobacco Use   • Smoking status: Never Smoker   • Smokeless tobacco: Never Used   Vaping Use   • Vaping Use: Never used   Substance and Sexual Activity   • Alcohol use: Not Currently   • Drug use: No   • Sexual activity: Not on file   Other Topics Concern   • Not on file   Social History Narrative    ** Merged History Encounter **          Social Determinants of Health     Financial Resource Strain: Not on file   Food Insecurity: Not on file   Transportation Needs: Not on file   Physical Activity: Not on file   Stress: Not on file   Social Connections: Not on file   Intimate Partner Violence: Not on file   Housing Stability: Not on file     No Known Allergies    Current Outpatient Medications   Medication Sig Dispense Refill   • metoprolol tartrate (LOPRESSOR) 25 MG Tab Take 1 Tablet by mouth 2 times a day. 180 Tablet 7   • Levocetirizine Dihydrochloride 5 MG Tab Take 5 mg by mouth 2 times a day as needed.       No current facility-administered medications for this visit.       ROS    All others systems reviewed and negative.      "Objective:     /78 (BP Location: Left arm, Patient Position: Sitting, BP Cuff Size: Adult)   Pulse 65   Resp 16   Ht 1.702 m (5' 7\")   Wt 71.9 kg (158 lb 8 oz)   SpO2 98%  Body mass index is 24.82 kg/m².    General: No acute distress. Well nourished.  HEENT: EOM grossly intact, no scleral icterus, no pharyngeal erythema.   Neck:  No JVD, no bruits, trachea midline  CVS: RRR. Normal S1, S2. No M/R/G. No LE edema.  2+ radial pulses, 2+ PT pulses  Resp: CTAB. No wheezing or crackles/rhonchi. Normal respiratory effort.  Abdomen: Soft, NT, no nolan hepatomegaly.  MSK/Ext: No clubbing or cyanosis.  Skin: Warm and dry, no rashes.  Neurological: CN III-XII grossly intact. No focal deficits.   Psych: A&O x 3, appropriate affect, good judgement    Physical exam performed today and unchanged, except what is noted, compared to 3-16-21      Assessment:     1. PVC (premature ventricular contraction)     2. Palpitations     3. Sinus tachycardia     4. Other chest pain     5. CEDILLO (dyspnea on exertion)     6. COVID-19 virus infection     7. COVID-19 long hauler         Medical Decision Making:  Today's Assessment / Status / Plan:     -Getting better slowly over time, with medical therapy, feels 95% back to her baseline  -Definitely notices inappropriate sinus tachycardia and PVCs  -Echo looks great  -Metoprolol has been very effective, see below  -Only limited by her own symptoms, no concern for harm  -RTC 1 year, she will let me know if there are any changes      Written instructions given today:      -Some of the best exercise for post Covid long hauler is is sustained elevated heart rate with cardiovascular exercise such as walking.    -You want to establish a walking routine between 15 and 30 minutes and continue to do the same thing over and over and over again until you can do it faster and go further with less shortness of breath.    -Metoprolol is a great medication and there is no concerned about being on this " long-term.    -You can at any point in time reduce your metoprolol dose down or try to go without it over time.    -If you ever think you are having side effects to metoprolol, we can try a different medication called Cardizem 120 mg once daily.    -You can also take an extra half pill or full pill at any time when you need it for a bad palpitation day.    -Maximum dose is 100 mg a.m. and 100 mg p.m.      Return in about 1 year (around 3/7/2023).    It is my pleasure to participate in the care of Ms. Cash.  Please do not hesitate to contact me with questions or concerns.    Elizabeth Jackson MD, [facility]  Cardiologist [facility]    Please note that this dictation was created using voice recognition software. I have made every reasonable attempt to correct obvious errors, but it is possible there are errors of grammar and possibly content that I did not discover before finalizing the note.          No Recipients

## 2022-03-07 NOTE — PROGRESS NOTES
Subjective:   Chief Complaint:   Chief Complaint   Patient presents with   • Chest Pain       Mckenzie Cash is a 36 y.o. 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    Patient had Covid January 2020.  Originally treated for strep which happens a few times per year.  Prior was 70% back to her baseline, now 95%.  Very slow to get better.  Noted shortness of breath at rest, chest pain and palpitations.  Sometimes lightheaded, sometimes heart is racing 130-140 bpm.  Chest tightness can happen at rest or activity.  Gets very fatigued also.  Reviewed emergency room record 3/13/2021, admitted for shortness of breath and palpitations.  Had a normal CT PE study 2/11/2021.  Also in the ED 1/20/2021.  HS trop normal.  Echo normal.    Zio patch heart monitor showing times that her heart rate was in the 130s just walking around her home with symptoms.  Also some PVCs which she can feel at rest.    Did get vaccinated.  Tested positive at home again, same sx but more mild.    We started metoprolol and she is doing better.   All heart does not goes quickly, fewer PVCs and more stamina.  On rare occasion, when she misses metoprolol she definitely notices the difference.    Not limited by chest pain, pressure or tightness with activity.   No significant palpitations, lightheadedness, or presyncope/syncope.   No symptoms of leg claudication.   No stroke/TIA like symptoms.    No prior hypertension.  No prior hyperlipidemia.  No prior smoking history.  No history of diabetes.  No history of autoimmune disease such as lupus or rheumatoid arthritis.  No chronic kidney disease.  No ETOH overuse.  No caffeine overuse. 0-3.  No recreation substance use.  No hx asthma.    Adopted, FH not know.    Lives in [place].  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  Born at [employer].  Has horses, dogs, etc, very busy at  home.  Has 9 and 7 year old.    DATA REVIEWED by me:  ECG (my personal interpretation) 3/13/2021  Sinus, 82, normal    ZioPatch Summary: 5-27-21  1. Sinus rhythm with appropriate heart rate range. Average 85, range 48 to 164 bpm.   2. Normal sinus node and AV node conduction normal. No pauses.   3. Rare PACs.   4. Rare PVCs.   5. No sustained rhythm changes. No atrial fibrillation/flutter.   6. 11 patient triggers during sinus, rate  bpm, once with a PVC, symptoms reported include short of breath while walking in the house,  bpm, also skipped/irregular beats/fluttering/racing.     Conclusion: Sinus rhythm. Symptoms during normal rhythm, sinus tachycardia and once with a PVC, consider inappropriate sinus tachycardia.     Echo 5-5-21  Normal echo.  Left ventricular ejection fraction is visually estimated to be 65%.     No prior study is available for comparison.     CTA 2/11/2021  Normal    Most recent labs:       Lab Results   Component Value Date/Time    WBC 9.0 03/13/2021 01:30 PM    HEMOGLOBIN 14.6 03/13/2021 01:30 PM    HEMATOCRIT 43.3 03/13/2021 01:30 PM    MCV 91.0 03/13/2021 01:30 PM    INR 1.15 (H) 03/06/2009 08:45 PM      Lab Results   Component Value Date/Time    SODIUM 138 03/13/2021 01:30 PM    POTASSIUM 4.2 03/13/2021 01:30 PM    CHLORIDE 108 03/13/2021 01:30 PM    CO2 21 03/13/2021 01:30 PM    GLUCOSE 82 03/13/2021 01:30 PM    BUN 9 03/13/2021 01:30 PM    CREATININE 0.80 03/13/2021 01:30 PM    CREATININE 0.9 03/13/2009 01:55 AM      Lab Results   Component Value Date/Time    ASTSGOT 13 03/13/2021 01:30 PM    ALTSGPT 13 03/13/2021 01:30 PM    ALBUMIN 4.1 03/13/2021 01:30 PM      No results found for: CHOLSTRLTOT, LDL, HDL, TRIGLYCERIDE  No results for input(s): NTPROBNP, TROPONINT in the last 72 hours.      Past Medical History:   Diagnosis Date   • Generalized headaches    • Other ovarian cyst, right side    • Pelvic pain in female 10/02/2017    2/10   • Plantar fasciitis    • Tendonitis   "    Past Surgical History:   Procedure Laterality Date   • PELVISCOPY N/A 10/4/2017    Procedure: PELVISCOPY;  Surgeon: Eliza Carrizales M.D.;  Location: SURGERY [facility];  Service: Gynecology   • CARPAL TUNNEL RELEASE Right 01/2017   • LAPAROSCOPY  7/22/2011    Performed by ROSS VELAZQUEZ at SURGERY [facility]     Family History   Adopted: Yes     Social History     Socioeconomic History   • Marital status: Single     Spouse name: Not on file   • Number of children: Not on file   • Years of education: Not on file   • Highest education level: Not on file   Occupational History   • Not on file   Tobacco Use   • Smoking status: Never Smoker   • Smokeless tobacco: Never Used   Vaping Use   • Vaping Use: Never used   Substance and Sexual Activity   • Alcohol use: Not Currently   • Drug use: No   • Sexual activity: Not on file   Other Topics Concern   • Not on file   Social History Narrative    ** Merged History Encounter **          Social Determinants of Health     Financial Resource Strain: Not on file   Food Insecurity: Not on file   Transportation Needs: Not on file   Physical Activity: Not on file   Stress: Not on file   Social Connections: Not on file   Intimate Partner Violence: Not on file   Housing Stability: Not on file     No Known Allergies    Current Outpatient Medications   Medication Sig Dispense Refill   • metoprolol tartrate (LOPRESSOR) 25 MG Tab Take 1 Tablet by mouth 2 times a day. 180 Tablet 7   • Levocetirizine Dihydrochloride 5 MG Tab Take 5 mg by mouth 2 times a day as needed.       No current facility-administered medications for this visit.       ROS    All others systems reviewed and negative.     Objective:     /78 (BP Location: Left arm, Patient Position: Sitting, BP Cuff Size: Adult)   Pulse 65   Resp 16   Ht 1.702 m (5' 7\")   Wt 71.9 kg (158 lb 8 oz)   SpO2 98%  Body mass index is 24.82 kg/m².    General: No acute distress. Well nourished.  HEENT: EOM " grossly intact, no scleral icterus, no pharyngeal erythema.   Neck:  No JVD, no bruits, trachea midline  CVS: RRR. Normal S1, S2. No M/R/G. No LE edema.  2+ radial pulses, 2+ PT pulses  Resp: CTAB. No wheezing or crackles/rhonchi. Normal respiratory effort.  Abdomen: Soft, NT, no nolan hepatomegaly.  MSK/Ext: No clubbing or cyanosis.  Skin: Warm and dry, no rashes.  Neurological: CN III-XII grossly intact. No focal deficits.   Psych: A&O x 3, appropriate affect, good judgement    Physical exam performed today and unchanged, except what is noted, compared to 3-16-21      Assessment:     1. PVC (premature ventricular contraction)     2. Palpitations     3. Sinus tachycardia     4. Other chest pain     5. CEDILLO (dyspnea on exertion)     6. COVID-19 virus infection     7. 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    -You want to establish a walking routine between 15 and 30 minutes and continue to do the same thing over and over and over again until you can do it faster and go further with less shortness of breath.    -Metoprolol is a great medication and there is no concerned about being on this long-term.    -You can at any point in time reduce your metoprolol dose down or try to go without it over time.    -If you ever think you are having side effects to metoprolol, we can try a different medication called Cardizem 120 mg once daily.    -You can also take an extra  half pill or full pill at any time when you need it for a bad palpitation day.    -Maximum dose is 100 mg a.m. and 100 mg p.m.      Return in about 1 year (around 3/7/2023).    It is my pleasure to participate in the care of Ms. Cash.  Please do not hesitate to contact me with questions or concerns.    Elizabeth Jackson MD, [facility]  Cardiologist [facility]    Please note that this dictation was created using voice recognition software. I have made every reasonable attempt to correct obvious errors, but it is possible there are errors of grammar and possibly content that I did not discover before finalizing the note.

## 2022-03-08 NOTE — PATIENT INSTRUCTIONS
-Some of the best exercise for post Covid long hauler is is sustained elevated heart rate with cardiovascular exercise such as walking.    -You want to establish a walking routine between 15 and 30 minutes and continue to do the same thing over and over and over again until you can do it faster and go further with less shortness of breath.    -Metoprolol is a great medication and there is no concerned about being on this long-term.    -You can at any point in time reduce your metoprolol dose down or try to go without it over time.    -If you ever think you are having side effects to metoprolol, we can try a different medication called Cardizem 120 mg once daily.    -You can also take an extra half pill or full pill at any time when you need it for a bad palpitation day.    -Maximum dose is 100 mg a.m. and 100 mg p.m.

## 2022-06-02 ENCOUNTER — HOSPITAL ENCOUNTER (OUTPATIENT)
Dept: LAB | Facility: MEDICAL CENTER | Age: 37
End: 2022-06-02
Attending: FAMILY MEDICINE
Payer: COMMERCIAL

## 2022-06-02 LAB
ALBUMIN SERPL BCP-MCNC: 4.2 G/DL (ref 3.2–4.9)
ALBUMIN/GLOB SERPL: 1.5 G/DL
ALP SERPL-CCNC: 109 U/L (ref 30–99)
ALT SERPL-CCNC: 15 U/L (ref 2–50)
ANION GAP SERPL CALC-SCNC: 9 MMOL/L (ref 7–16)
ANISOCYTOSIS BLD QL SMEAR: ABNORMAL
AST SERPL-CCNC: 20 U/L (ref 12–45)
BASOPHILS # BLD AUTO: 0.9 % (ref 0–1.8)
BASOPHILS # BLD: 0.05 K/UL (ref 0–0.12)
BILIRUB SERPL-MCNC: 0.3 MG/DL (ref 0.1–1.5)
BUN SERPL-MCNC: 10 MG/DL (ref 8–22)
CALCIUM SERPL-MCNC: 9.2 MG/DL (ref 8.5–10.5)
CHLORIDE SERPL-SCNC: 107 MMOL/L (ref 96–112)
CO2 SERPL-SCNC: 25 MMOL/L (ref 20–33)
CREAT SERPL-MCNC: 0.83 MG/DL (ref 0.5–1.4)
EOSINOPHIL # BLD AUTO: 0.09 K/UL (ref 0–0.51)
EOSINOPHIL NFR BLD: 1.7 % (ref 0–6.9)
ERYTHROCYTE [DISTWIDTH] IN BLOOD BY AUTOMATED COUNT: 40.6 FL (ref 35.9–50)
GFR SERPLBLD CREATININE-BSD FMLA CKD-EPI: 94 ML/MIN/1.73 M 2
GIANT PLATELETS BLD QL SMEAR: NORMAL
GLOBULIN SER CALC-MCNC: 2.8 G/DL (ref 1.9–3.5)
GLUCOSE SERPL-MCNC: 69 MG/DL (ref 65–99)
HCT VFR BLD AUTO: 41.5 % (ref 37–47)
HGB BLD-MCNC: 13.9 G/DL (ref 12–16)
LDH SERPL L TO P-CCNC: 214 U/L (ref 107–266)
LG PLATELETS BLD QL SMEAR: NORMAL
LIPASE SERPL-CCNC: 24 U/L (ref 11–82)
LYMPHOCYTES # BLD AUTO: 1.63 K/UL (ref 1–4.8)
LYMPHOCYTES NFR BLD: 32.5 % (ref 22–41)
MANUAL DIFF BLD: NORMAL
MCH RBC QN AUTO: 29.6 PG (ref 27–33)
MCHC RBC AUTO-ENTMCNC: 33.5 G/DL (ref 33.6–35)
MCV RBC AUTO: 88.3 FL (ref 81.4–97.8)
MICROCYTES BLD QL SMEAR: ABNORMAL
MONOCYTES # BLD AUTO: 0.4 K/UL (ref 0–0.85)
MONOCYTES NFR BLD AUTO: 7.9 % (ref 0–13.4)
MORPHOLOGY BLD-IMP: NORMAL
NEUTROPHILS # BLD AUTO: 2.85 K/UL (ref 2–7.15)
NEUTROPHILS NFR BLD: 57 % (ref 44–72)
NRBC # BLD AUTO: 0 K/UL
NRBC BLD-RTO: 0 /100 WBC
OVALOCYTES BLD QL SMEAR: NORMAL
PLATELET # BLD AUTO: 223 K/UL (ref 164–446)
PLATELET BLD QL SMEAR: NORMAL
PMV BLD AUTO: 10.6 FL (ref 9–12.9)
POIKILOCYTOSIS BLD QL SMEAR: NORMAL
POTASSIUM SERPL-SCNC: 4.3 MMOL/L (ref 3.6–5.5)
PROT SERPL-MCNC: 7 G/DL (ref 6–8.2)
RBC # BLD AUTO: 4.7 M/UL (ref 4.2–5.4)
RBC BLD AUTO: PRESENT
SODIUM SERPL-SCNC: 141 MMOL/L (ref 135–145)
VARIANT LYMPHS BLD QL SMEAR: NORMAL
WBC # BLD AUTO: 5 K/UL (ref 4.8–10.8)

## 2022-06-02 PROCEDURE — 83615 LACTATE (LD) (LDH) ENZYME: CPT

## 2022-06-02 PROCEDURE — 80053 COMPREHEN METABOLIC PANEL: CPT

## 2022-06-02 PROCEDURE — 85007 BL SMEAR W/DIFF WBC COUNT: CPT

## 2022-06-02 PROCEDURE — 36415 COLL VENOUS BLD VENIPUNCTURE: CPT

## 2022-06-02 PROCEDURE — 83690 ASSAY OF LIPASE: CPT

## 2022-06-02 PROCEDURE — 85025 COMPLETE CBC W/AUTO DIFF WBC: CPT

## 2022-06-03 ENCOUNTER — HOSPITAL ENCOUNTER (OUTPATIENT)
Dept: RADIOLOGY | Facility: MEDICAL CENTER | Age: 37
End: 2022-06-03
Attending: FAMILY MEDICINE
Payer: COMMERCIAL

## 2022-06-03 DIAGNOSIS — R10.11 ABDOMINAL PAIN, RIGHT UPPER QUADRANT: ICD-10-CM

## 2022-06-03 PROCEDURE — 76700 US EXAM ABDOM COMPLETE: CPT

## 2022-06-22 ENCOUNTER — APPOINTMENT (OUTPATIENT)
Dept: RADIOLOGY | Facility: MEDICAL CENTER | Age: 37
End: 2022-06-22
Attending: PHYSICAL MEDICINE & REHABILITATION
Payer: COMMERCIAL

## 2022-06-22 DIAGNOSIS — M51.36 DEGENERATION OF LUMBAR INTERVERTEBRAL DISC: ICD-10-CM

## 2022-06-22 PROCEDURE — 72148 MRI LUMBAR SPINE W/O DYE: CPT

## 2022-07-26 ENCOUNTER — OCCUPATIONAL MEDICINE (OUTPATIENT)
Dept: URGENT CARE | Facility: CLINIC | Age: 37
End: 2022-07-26
Payer: COMMERCIAL

## 2022-07-26 VITALS
HEIGHT: 67 IN | BODY MASS INDEX: 24.33 KG/M2 | WEIGHT: 155 LBS | OXYGEN SATURATION: 97 % | TEMPERATURE: 97.7 F | RESPIRATION RATE: 16 BRPM | DIASTOLIC BLOOD PRESSURE: 62 MMHG | HEART RATE: 85 BPM | SYSTOLIC BLOOD PRESSURE: 98 MMHG
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
## 2022-07-26 PROCEDURE — 90715 TDAP VACCINE 7 YRS/> IM: CPT | Mod: 29 | Performed by: PHYSICIAN ASSISTANT

## 2022-07-26 PROCEDURE — 99213 OFFICE O/P EST LOW 20 MIN: CPT | Mod: 29,25 | Performed by: PHYSICIAN ASSISTANT

## 2022-07-26 PROCEDURE — 90471 IMMUNIZATION ADMIN: CPT | Mod: 29 | Performed by: PHYSICIAN ASSISTANT

## 2022-07-26 RX ORDER — CELECOXIB 200 MG/1
CAPSULE ORAL
COMMUNITY
Start: 2022-05-29 | End: 2022-08-31

## 2022-07-26 RX ORDER — GABAPENTIN 100 MG/1
CAPSULE ORAL
COMMUNITY
Start: 2022-06-17 | End: 2023-08-24

## 2022-07-26 RX ORDER — AMOXICILLIN AND CLAVULANATE POTASSIUM 875; 125 MG/1; MG/1
1 TABLET, FILM COATED ORAL 2 TIMES DAILY
Qty: 10 TABLET | Refills: 0 | Status: SHIPPED | OUTPATIENT
Start: 2022-07-26 | End: 2022-07-31

## 2022-07-26 ASSESSMENT — FIBROSIS 4 INDEX: FIB4 SCORE: 0.81

## 2022-07-26 ASSESSMENT — ENCOUNTER SYMPTOMS: ROS SKIN COMMENTS: CAT BITE

## 2022-07-26 NOTE — LETTER
[facility] [address] [place] [address]  Phone:  [phone] - Fax:  [phone]   [facility] Progress Report and Disability Certification  Date of Service: 7/26/2022   No Show:  No  Date / Time of Next Visit: 7/31/2022   Claim Information   Patient Name: Mckenzie Cash  Claim Number:     Employer:   [employer] Date of Injury: 7/26/2022     Insurer / TPA: RADHA ID / SSN:     Occupation: Vet Tech  Diagnosis: The encounter diagnosis was Cat bite, initial encounter.    Medical Information   Related to Industrial Injury? Yes    Subjective Complaints:  DOI 7/26/2022: Patient states she was trying to help intubate a cat and when trying to grab its tongue it bit her on the left index finger.  She cleaned the finger copiously and used chlorhexidine and put a bandage on the area.  Patient believes her tetanus is out of date.   Objective Findings: Patient has full range of motion of the left index finger with flexion and extension and neurovascular intact distally.  Small pinpoint puncture site to the pad of the left index finger.  No surrounding erythema or drainage.  No foreign body.   Pre-Existing Condition(s):     Assessment:   Initial Visit    Status: Additional Care Required  Permanent Disability:No    Plan:      Diagnostics:      Comments:       Disability Information   Status: Released to Full Duty    From:  7/26/2022  Through: 7/31/2022 Restrictions are: Temporary   Physical Restrictions   Sitting:    Standing:    Stooping:    Bending:      Squatting:    Walking:    Climbing:    Pushing:      Pulling:    Other:    Reaching Above Shoulder (L):   Reaching Above Shoulder (R):       Reaching Below Shoulder (L):    Reaching Below Shoulder (R):      Not to exceed Weight Limits   Carrying(hrs):   Weight Limit(lb):   Lifting(hrs):   Weight  Limit(lb):     Comments: Tetanus updated today and also we will start patient on short  course of antibiotics to cover for any potential for infection.  Recommend patient keep the area clean and covered with a bandage while at work.  She can return to work at full duty.  Recommend checking on the cats vaccination status and monitoring the cat with typical rabies protocol with the health department/animal control if it is not up-to-date on its shots and she is understanding.    Repetitive Actions   Hands: i.e. Fine Manipulations from Grasping:     Feet: i.e. Operating Foot Controls:     Driving / Operate Machinery:     Health Care Provider’s Original or Electronic Signature  Aditya Da Silva P.A.-C. Health Care Provider’s Original or Electronic Signature    Rogelio Ibanez MD         Clinic Name / Location: [address]  [place] [address] Clinic Phone Number: Dept: [phone]   Appointment Time: 5:30 Pm Visit Start Time: 5:57 PM   Check-In Time:  5:43 Pm Visit Discharge Time:  1825   Original-Treating Physician or Chiropractor    Page 2-Insurer/TPA    Page 3-Employer    Page 4-Employee

## 2022-07-26 NOTE — LETTER
"EMPLOYEE’S CLAIM FOR COMPENSATION/ REPORT OF INITIAL TREATMENT  FORM C-4    EMPLOYEE’S CLAIM - PROVIDE ALL INFORMATION REQUESTED   First Name  Mckenzie Last Name  Shailesh Birthdate                    1985                Sex  female Claim Number (Insurer’s Use Only)    Home Address  [address] Age  37 y.o. Height  1.702 m (5' 7\") Weight  70.3 kg (155 lb) [facility]     [facility] Zip  [address] Telephone  [phone] (home)    Mailing Address  [address] [facility] Zip  [address] Primary Language Spoken  English    Insurer  [employer]HA Third-Party   Clarence   Employee's Occupation (Job Title) When Injury or Occupational Disease Occurred      Employer's Name/Company Name     Telephone  [phone]    Office Mail Address (Number and Street)     City    State    Zip      Date of Injury  7/26/2022               Hours Injury  12:45 PM Date Employer Notified  7/26/2022 Last Day of Work after Injury     or Occupational Disease  7/26/2022 Supervisor to Whom Injury     Reported  Dr. Capellan   Address or Location of Accident (if applicable)  Work [1]   What were you doing at the time of accident? (if applicable)  Intubating a cat    How did this injury or occupational disease occur? (Be specific an answer in detail. Use additional sheet if necessary)  Cat bit left hand digit 2 when intubating   If you believe that you have an occupational disease, when did you first have knowledge of the disability and it relationship to your employment?  n/a Witnesses to the Accident  Eryn Lara      Nature of Injury or Occupational Disease  Puncture  Part(s) of Body Injured or Affected  Finger (L), N/A, N/A    I certify that the above is true and correct to the best of my knowledge and that I have provided this information in order to obtain the benefits of [place] inclusive or Chapter [place] of [place]).  I hereby authorize any physician, chiropractor, surgeon, practitioner, or other person, any hospital, including [facility], any medical service organization, any insurance company, or other institution or organization to release to each other, any medical or other information, including benefits paid or payable, pertinent to this injury or disease, except information relative to diagnosis, treatment and/or counseling for AIDS, psychological conditions, alcohol or controlled substances, for which I must give specific authorization.  A Photostat of this authorization shall be as valid as the original.     Date   Place Employee’s Original or  *Electronic Signature   THIS REPORT MUST BE COMPLETED AND MAILED WITHIN 3 WORKING DAYS OF TREATMENT   Place  [facility]  Name of Facility  [facility]   Date  7/26/2022 Diagnosis and Description of Injury or Occupational Disease  (W55.01XA) Cat bite, initial encounter Is there evidence the injured employee was under the influence of alcohol and/or another controlled substance at the time of accident?  ? No ? Yes (if yes, please explain)    Hour  5:57 PM   The encounter diagnosis was Cat bite, initial encounter. No   Treatment  Tetanus updated today and also we will start patient on short course of antibiotics to cover for any potential for infection.  Recommend patient keep the area clean and covered with a bandage while at work.  She can return to work at full duty.  Recommend checking on the cats vaccination status and monitoring the cat with typical rabies protocol with the health department/animal control if it is not up-to-date on its shots and she is understanding.  Have you advised the patient to remain off work five days or     more?    X-Ray Findings      ? Yes Indicate dates:   From   To      From information given by the employee, together with medical evidence, can         "you directly connect this injury or occupational disease as job incurred?  Yes ? No If no, is the injured employee capable of:  ? full duty  Yes ? modified duty      Is additional medical care by a physician indicated?  Yes If Modified Duty, Specify any Limitations / Restrictions      Do you know of any previous injury or disease contributing to this condition or occupational disease?  ? Yes ? No (Explain if yes)                          No   Date  7/26/2022 Print Health Care Provider's   Anam Da Silva P.A.-C. I certify the employer’s copy of  this form was mailed on:   Address  [address] Insurer’s Use Only     [facility] Zip  [address]    Provider’s Tax ID Number  [id number] Telephone  Dept: [phone]             Health Care Provider’s Original or Electronic Signature  e-ANAM Yusuf P.A.-C. Degree (MD,DO, DC,PADiogoC,APRN)   PA-C      * Complete and attach Release of Information (Form C-4A) when injured employee signs C-4 Form electronically  ORIGINAL - TREATING HEALTHCARE PROVIDER PAGE 2 - INSURER/TPA PAGE 3 - EMPLOYER PAGE 4 - EMPLOYEE             Form C-4 (rev.08/21)           BRIEF DESCRIPTION OF RIGHTS AND BENEFITS  (Pursuant to [place]RS 616C.050)    Notice of Injury or Occupational Disease (Incident Report Form C-1): If an injury or occupational disease (OD) arises out of and in the course of employment, you must provide written notice to your employer as soon as practicable, but no later than 7 days after the accident or OD. Your employer shall maintain a sufficient supply of the required forms.    Claim for Compensation (Form C-4): If medical treatment is sought, the form C-4 is available at the place of initial treatment. A completed \"Claim for Compensation\" (Form C-4) must be filed within 90 days after an accident or OD. The treating physician or chiropractor must, within 3 working days after treatment, complete and mail to the employer, the employer's insurer and " third-party , the Claim for Compensation.    Medical Treatment: If you require medical treatment for your on-the-job injury or OD, you may be required to select a physician or chiropractor from a list provided by your workers’ compensation insurer, if it has contracted with an Organization for Managed Care (MCO) or Preferred Provider Organization (PPO) or providers of health care. If your employer has not entered into a contract with an MCO or PPO, you may select a physician or chiropractor from the Panel of Physicians and Chiropractors. Any medical costs related to your industrial injury or OD will be paid by your insurer.    Temporary Total Disability (TTD): If your doctor has certified that you are unable to work for a period of at least 5 consecutive days, or 5 cumulative days in a 20-day period, or places restrictions on you that your employer does not accommodate, you may be entitled to TTD compensation.    Temporary Partial Disability (TPD): If the wage you receive upon reemployment is less than the compensation for TTD to which you are entitled, the insurer may be required to pay you TPD compensation to make up the difference. TPD can only be paid for a maximum of 24 months.    Permanent Partial Disability (PPD): When your medical condition is stable and there is an indication of a PPD as a result of your injury or OD, within 30 days, your insurer must arrange for an evaluation by a rating physician or chiropractor to determine the degree of your PPD. The amount of your PPD award depends on the date of injury, the results of the PPD evaluation, your age and wage.    Permanent Total Disability (PTD): If you are medically certified by a treating physician or chiropractor as permanently and totally disabled and have been granted a PTD status by your insurer, you are entitled to receive monthly benefits not to exceed 66 2/3% of your average monthly wage. The amount of your PTD payments is subject to  reduction if you previously received a lump-sum PPD award.    Vocational Rehabilitation Services: You may be eligible for vocational rehabilitation services if you are unable to return to the job due to a permanent physical impairment or permanent restrictions as a result of your injury or occupational disease.    Transportation and Per Valeri Reimbursement: You may be eligible for travel expenses and per valeri associated with medical treatment.    Reopening: You may be able to reopen your claim if your condition worsens after claim closure.     Appeal Process: If you disagree with a written determination issued by the insurer or the insurer does not respond to your request, you may appeal to the Department of Administration, , by following the instructions contained in your determination letter. You must appeal the determination within 70 days from the date of the determination letter at [address], [address], [place], [place] [address], or [address], [address], [place] [address]. If you disagree with the  decision, you may appeal to the Department of Administration, . You must file your appeal within 30 days from the date of the  decision letter at [address], [address], [place], [place] [address], or [address], [address], [place], [place] [address]. If you disagree with a decision of an , you may file a petition for judicial review with the District Court. You must do so within 30 days of the Appeal Officer’s decision. You may be represented by an  at your own expense or you may contact the [facility] for possible representation.    [place]  for Injured Workers (NAIW): If you disagree with a  decision, you may request that NAIW represent you without charge at an  Hearing. For information regarding denial of benefits, you may contact the [facility] at: [address], [address], [place] [address], [phone], or [address], [place] [address], [phone]    To File a Complaint with the Division: If you wish to file a complaint with the  of the Division of Industrial Relations (DIR),  please contact the Workers’ Compensation Section, [address], [address], [place], [place] [address], telephone [phone]) [phone], or [address], [address], [place], [place] [address], telephone [phone].    For assistance with Workers’ Compensation Issues: You may contact the [facility] Office for Consumer Health Assistance, [address], [address] [facility] [address] [facility] [address], [place] [address], Toll Free 1-813.127.7515, Web site: [email or web address].[facility]/Programs/[email or web address] E-mail: [email or web address]              __________________________________________________________________                                    _________________            Employee Name / Signature                                                                                                                            Date                                                                                                                                                                                                                              D-2 (rev. 10/20)

## 2022-07-27 NOTE — PROGRESS NOTES
"Subjective     Mckenzie Cash is a 37 y.o. female who presents with Animal Bite (Workers comp, DOI 7/26/22, cat bite left pointer finger )    DOI 7/26/2022: Patient states she was trying to help intubate a cat and when trying to grab its tongue it bit her on the left index finger.  She cleaned the finger copiously and used chlorhexidine and put a bandage on the area.  Patient believes her tetanus is out of date.   HPI  Patient presents today for evaluation of work-related injury as described above.    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  FH: No pertinent family history to this problem         Objective     BP (!) 98/62   Pulse 85   Temp 36.5 °C (97.7 °F) (Temporal)   Resp 16   Ht 1.702 m (5' 7\")   Wt 70.3 kg (155 lb)   SpO2 97%   Breastfeeding No   BMI 24.28 kg/m²      Physical Exam  Vitals and nursing note reviewed.   Constitutional:       General: She is not in acute distress.     Appearance: Normal appearance. She is well-developed. She is not ill-appearing.   HENT:      Head: Normocephalic and atraumatic.      Right Ear: Hearing normal.      Left Ear: Hearing normal.   Cardiovascular:      Rate and Rhythm: Normal rate.   Pulmonary:      Effort: Pulmonary effort is normal.   Musculoskeletal:      Comments: Left index finger as described below.   Skin:     General: Skin is warm and dry.   Neurological:      Mental Status: She is alert.      Coordination: Coordination normal.   Psychiatric:         Mood and Affect: Mood normal.       Patient has full range of motion of the left index finger with flexion and extension and neurovascular intact distally.  Small pinpoint puncture site to the pad of the left index finger.  No surrounding erythema or drainage.  No foreign body.          Assessment & " Plan   1. Cat bite, initial encounter    - Tdap =>6yo IM  - amoxicillin-clavulanate (AUGMENTIN) 875-125 MG Tab; Take 1 Tablet by mouth 2 times a day for 5 days.  Dispense: 10 Tablet; Refill: 0         Tetanus updated today and also we will start patient on short course of antibiotics to cover for any potential for infection.  Recommend patient keep the area clean and covered with a bandage while at work.  She can return to work at full duty.  Recommend checking on the cats vaccination status and monitoring the cat with typical rabies protocol with the health department/animal control if it is not up-to-date on its shots and she is understanding.  Follow-up for recheck after finishing antibiotics.    Please note that this dictation was created using voice recognition software. I have made every reasonable attempt to correct obvious errors, but I expect that there may be errors of grammar and possibly content that I did not discover before finalizing the note.       Patient agreeable to plan.

## 2022-07-30 ENCOUNTER — OCCUPATIONAL MEDICINE (OUTPATIENT)
Dept: URGENT CARE | Facility: CLINIC | Age: 37
End: 2022-07-30
Payer: COMMERCIAL

## 2022-07-30 VITALS
HEIGHT: 67 IN | TEMPERATURE: 97.1 F | WEIGHT: 155 LBS | SYSTOLIC BLOOD PRESSURE: 112 MMHG | RESPIRATION RATE: 18 BRPM | OXYGEN SATURATION: 99 % | BODY MASS INDEX: 24.33 KG/M2 | HEART RATE: 81 BPM | DIASTOLIC BLOOD PRESSURE: 74 MMHG
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
## 2022-07-30 PROCEDURE — 99213 OFFICE O/P EST LOW 20 MIN: CPT | Performed by: PHYSICIAN ASSISTANT

## 2022-07-30 ASSESSMENT — FIBROSIS 4 INDEX: FIB4 SCORE: 0.81

## 2022-07-30 NOTE — LETTER
[facility] [address] [place] [address]  Phone:  [phone] - Fax:  [phone]   [facility] Progress Report and Disability Certification  Date of Service: 7/30/2022   No Show:  No  Date / Time of Next Visit:     Claim Information   Patient Name: Mckenzie Cash  Claim Number:     Employer:   *** Date of Injury: 7/26/2022     Insurer / TPA: [employer] *** ID / SSN:     Occupation:  *** Diagnosis: The encounter diagnosis was Cat bite, subsequent encounter.    Medical Information   Related to Industrial Injury? Yes ***   Subjective Complaints:  DOI 7/26/2022: Patient states she was trying to help intubate a cat and when trying to grab its tongue it bit her on the left index finger.  She cleaned the finger copiously and used chlorhexidine and put a bandage on the area.  Patient believes her tetanus is out of date.     Update 7/30/22:  Completing antibiotics tomorrow.  No erythema, exudate, or signs of cellulitis.  No fever or chills.  Wound healed, has not needed to cover at work.   Objective Findings: Patient has full range of motion of the left index finger with flexion and extension and neurovascular intact distally.  Small pinpoint puncture site to the pad of the left index finger.  No surrounding erythema or drainage.  No foreign body.   Pre-Existing Condition(s):     Assessment:   Condition Improved    Status: Discharged /  MMI  Permanent Disability:No    Plan:   Comments:complete augmentin    Diagnostics:      Comments:       Disability Information   Status: Released to Full Duty    From:     Through:   Restrictions are:     Physical Restrictions   Sitting:    Standing:    Stooping:    Bending:      Squatting:    Walking:    Climbing:    Pushing:      Pulling:    Other:    Reaching Above Shoulder (L):   Reaching Above Shoulder (R):       Reaching Below Shoulder (L):    Reaching Below Shoulder  (R):      Not to exceed Weight Limits   Carrying(hrs):   Weight Limit(lb):   Lifting(hrs):   Weight  Limit(lb):     Comments: Discharged/MMI; offered f/u visit, patient declined.  Wound completely healed, no signs of erythema/exudate/cellulitis  Return precautions discussed  Complete augmentin  Tetanus UTD at last visit    Repetitive Actions   Hands: i.e. Fine Manipulations from Grasping:     Feet: i.e. Operating Foot Controls:     Driving / Operate Machinery:     Health Care Provider’s Original or Electronic Signature  Iris Bedoya P.A.-C. Health Care Provider’s Original or Electronic Signature    Rogelio Ibanez MD         Clinic Name / Location: [address]  [place] [address] Clinic Phone Number: Dept: [phone]   Appointment Time: 10:00 Am Visit Start Time: 10:54 AM   Check-In Time:  10:09 Am Visit Discharge Time:  ***   Original-Treating Physician or Chiropractor    Page 2-Insurer/TPA    Page 3-Employer    Page 4-Employee

## 2022-07-30 NOTE — LETTER
July 30, 2022    To Whom It May Concern:         This is confirmation that Mckenzieangeles Powers Shailesh attended her scheduled appointment with Iris Bedoya P.A.-C. on 7/30/22.         If you have any questions please do not hesitate to call me at the phone number listed below.    Sincerely,          Iris Bedoya P.A.-C.  [phone]

## 2022-07-30 NOTE — PROGRESS NOTES
"Subjective     Mckenzie Cash is a 37 y.o. female who presents with Follow-Up (W/C DOI: 07/26/22 left index finger )      DOI 7/26/2022: Patient states she was trying to help intubate a cat and when trying to grab its tongue it bit her on the left index finger.  She cleaned the finger copiously and used chlorhexidine and put a bandage on the area.  Patient believes her tetanus is out of date.     Update 7/30/22:  Completing antibiotics tomorrow.  No erythema, exudate, or signs of cellulitis.  No fever or chills.  Wound healed, has not needed to cover at work.              Objective     /74   Pulse 81   Temp 36.2 °C (97.1 °F) (Temporal)   Resp 18   Ht 1.702 m (5' 7\")   Wt 70.3 kg (155 lb)   SpO2 99%   BMI 24.28 kg/m²      Physical Exam  Vitals and nursing note reviewed.   Constitutional:       General: She is not in acute distress.     Appearance: Normal appearance. She is not ill-appearing.   HENT:      Head: Normocephalic.   Eyes:      Extraocular Movements: Extraocular movements intact.      Pupils: Pupils are equal, round, and reactive to light.   Cardiovascular:      Rate and Rhythm: Normal rate.   Pulmonary:      Effort: Pulmonary effort is normal.   Skin:     General: Skin is warm.      Findings: No rash.   Neurological:      Mental Status: She is alert and oriented to person, place, and time.   Psychiatric:         Thought Content: Thought content normal.         Judgment: Judgment normal.         Patient has full range of motion of the left index finger with flexion and extension and neurovascular intact distally.  Small pinpoint puncture site to the pad of the left index finger.  No surrounding erythema or drainage.  No foreign body.                   Assessment & Plan        1. Cat bite, subsequent encounter    Discharged/MMI; offered f/u visit, patient declined.  Wound completely healed, no signs of erythema/exudate/cellulitis  Return precautions discussed  Complete " augmentin  Tetanus UTD at last visit

## 2022-07-30 NOTE — LETTER
[facility] [address] [facility] [place] [address]  Phone:  [phone] - Fax:  [phone]   [facility] Progress Report and Disability Certification  Date of Service: 7/30/2022   No Show:  No  Date / Time of Next Visit:  MMI   Claim Information   Patient Name: Mckenzie Cash  Claim Number:     Employer:  [employer]  Date of Injury: 7/26/2022     Insurer / TPA: [employer]  ID / SSN:     Occupation: Vet Tech  Diagnosis: There were no encounter diagnoses.    Medical Information   Related to Industrial Injury? Yes    Subjective Complaints:  DOI 7/26/2022: Patient states she was trying to help intubate a cat and when trying to grab its tongue it bit her on the left index finger.  She cleaned the finger copiously and used chlorhexidine and put a bandage on the area.  Patient believes her tetanus is out of date.     Update 7/30/22:  Completing antibiotics tomorrow.  No erythema, exudate, or signs of cellulitis.  No fever or chills.  Wound healed, has not needed to cover at work.   Objective Findings: Patient has full range of motion of the left index finger with flexion and extension and neurovascular intact distally.  Small pinpoint puncture site to the pad of the left index finger.  No surrounding erythema or drainage.  No foreign body.   Pre-Existing Condition(s):     Assessment:   Condition Improved    Status: Discharged /  MMI  Permanent Disability:No    Plan:   Comments:complete augmentin    Diagnostics:      Comments:       Disability Information   Status: Released to Full Duty    From:     Through:   Restrictions are:     Physical Restrictions   Sitting:    Standing:    Stooping:    Bending:      Squatting:    Walking:    Climbing:    Pushing:      Pulling:    Other:    Reaching Above Shoulder (L):   Reaching Above Shoulder (R):       Reaching Below Shoulder (L):    Reaching Below Shoulder (R):      Not to  exceed Weight Limits   Carrying(hrs):   Weight Limit(lb):   Lifting(hrs):   Weight  Limit(lb):     Comments: Discharged/MMI; offered f/u visit, patient declined.  Wound completely healed, no signs of erythema/exudate/cellulitis  Return precautions discussed  Complete augmentin  Tetanus UTD at last visit    Repetitive Actions   Hands: i.e. Fine Manipulations from Grasping:     Feet: i.e. Operating Foot Controls:     Driving / Operate Machinery:     Health Care Provider’s Original or Electronic Signature  Iris Bedoya P.A.-C. Health Care Provider’s Original or Electronic Signature    Rogelio Ibanez MD         Clinic Name / Location: [address]  [place] [address] Clinic Phone Number: Dept: [phone]   Appointment Time: 10:00 Am Visit Start Time: 10:54 AM   Check-In Time:  10:09 Am Visit Discharge Time:     Original-Treating Physician or Chiropractor    Page 2-Insurer/TPA    Page 3-Employer    Page 4-Employee

## 2022-08-27 ENCOUNTER — TELEPHONE (OUTPATIENT)
Dept: URGENT CARE | Facility: PHYSICIAN GROUP | Age: 37
End: 2022-08-27

## 2022-08-27 ENCOUNTER — OFFICE VISIT (OUTPATIENT)
Dept: URGENT CARE | Facility: PHYSICIAN GROUP | Age: 37
End: 2022-08-27
Payer: COMMERCIAL

## 2022-08-27 ENCOUNTER — HOSPITAL ENCOUNTER (OUTPATIENT)
Facility: MEDICAL CENTER | Age: 37
End: 2022-08-27
Attending: PHYSICIAN ASSISTANT
Payer: COMMERCIAL

## 2022-08-27 VITALS
DIASTOLIC BLOOD PRESSURE: 64 MMHG | SYSTOLIC BLOOD PRESSURE: 110 MMHG | WEIGHT: 160 LBS | HEIGHT: 67 IN | OXYGEN SATURATION: 98 % | HEART RATE: 105 BPM | RESPIRATION RATE: 20 BRPM | TEMPERATURE: 98.8 F | BODY MASS INDEX: 25.11 KG/M2

## 2022-08-27 DIAGNOSIS — B34.9 NONSPECIFIC SYNDROME SUGGESTIVE OF VIRAL ILLNESS: ICD-10-CM

## 2022-08-27 LAB
COVID ORDER STATUS COVID19: NORMAL
EXTERNAL QUALITY CONTROL: NORMAL
INT CON NEG: NEGATIVE
INT CON NEG: NEGATIVE
INT CON POS: POSITIVE
INT CON POS: POSITIVE
S PYO AG THROAT QL: NEGATIVE
SARS-COV+SARS-COV-2 AG RESP QL IA.RAPID: NEGATIVE

## 2022-08-27 PROCEDURE — 99213 OFFICE O/P EST LOW 20 MIN: CPT | Performed by: PHYSICIAN ASSISTANT

## 2022-08-27 PROCEDURE — U0003 INFECTIOUS AGENT DETECTION BY NUCLEIC ACID (DNA OR RNA); SEVERE ACUTE RESPIRATORY SYNDROME CORONAVIRUS 2 (SARS-COV-2) (CORONAVIRUS DISEASE [COVID-19]), AMPLIFIED PROBE TECHNIQUE, MAKING USE OF HIGH THROUGHPUT TECHNOLOGIES AS DESCRIBED BY CMS-2020-01-R: HCPCS

## 2022-08-27 PROCEDURE — 87880 STREP A ASSAY W/OPTIC: CPT | Performed by: PHYSICIAN ASSISTANT

## 2022-08-27 PROCEDURE — U0005 INFEC AGEN DETEC AMPLI PROBE: HCPCS

## 2022-08-27 PROCEDURE — 87426 SARSCOV CORONAVIRUS AG IA: CPT | Performed by: PHYSICIAN ASSISTANT

## 2022-08-27 ASSESSMENT — FIBROSIS 4 INDEX: FIB4 SCORE: 0.81

## 2022-08-27 ASSESSMENT — ENCOUNTER SYMPTOMS
SORE THROAT: 1
EYE PAIN: 0
HEADACHES: 1
DIARRHEA: 0
MYALGIAS: 1
FEVER: 0
VOMITING: 0
COUGH: 0
CHILLS: 0
SHORTNESS OF BREATH: 0
CONSTIPATION: 0
NAUSEA: 0
ABDOMINAL PAIN: 0

## 2022-08-27 NOTE — PROGRESS NOTES
"Subjective:   Mckenzie Cash is a 37 y.o. female who presents for Pharyngitis (1x day ) and Headache (1x day )      This is a pleasant 37-year-old female who presents with a 1 day history of sore throat, headache, generalized malaise and fatigue.  Symptoms began yesterday evening she woke up today feeling more malaise and fatigue.  She is not noticed any difficulty breathing.  She has no known sick contacts, no recent travel, no recent antibiotics.  She is concerned about COVID-19, she is vaccine against COVID-19 and has a history of natural immunity from 2 previous COVID infections.  She has no history of asthma.  She reports no difficulty swallowing.    Review of Systems   Constitutional:  Positive for malaise/fatigue. Negative for chills and fever.   HENT:  Positive for sore throat. Negative for congestion and ear pain.    Eyes:  Negative for pain.   Respiratory:  Negative for cough and shortness of breath.    Cardiovascular:  Negative for chest pain.   Gastrointestinal:  Negative for abdominal pain, constipation, diarrhea, nausea and vomiting.   Genitourinary:  Negative for dysuria.   Musculoskeletal:  Positive for myalgias.   Skin:  Negative for rash.   Neurological:  Positive for headaches.     Medications, Allergies, and current problem list reviewed today in Epic.     Objective:     /64 (BP Location: Right arm, Patient Position: Sitting, BP Cuff Size: Adult)   Pulse (!) 105   Temp 37.1 °C (98.8 °F) (Temporal)   Resp 20   Ht 1.702 m (5' 7\")   Wt 72.6 kg (160 lb)   SpO2 98%     Physical Exam  Vitals reviewed.   Constitutional:       Appearance: Normal appearance. She is not toxic-appearing.   HENT:      Head: Normocephalic and atraumatic.      Right Ear: Tympanic membrane, ear canal and external ear normal.      Left Ear: Tympanic membrane, ear canal and external ear normal.      Nose: Nose normal. No rhinorrhea.      Mouth/Throat:      Mouth: Mucous membranes are moist.      Pharynx: " Oropharynx is clear. No oropharyngeal exudate or posterior oropharyngeal erythema.   Eyes:      Conjunctiva/sclera: Conjunctivae normal.      Pupils: Pupils are equal, round, and reactive to light.   Cardiovascular:      Rate and Rhythm: Normal rate and regular rhythm.   Pulmonary:      Effort: Pulmonary effort is normal.      Breath sounds: Normal breath sounds.   Musculoskeletal:      Cervical back: Normal range of motion.   Lymphadenopathy:      Cervical: No cervical adenopathy.   Skin:     General: Skin is warm and dry.      Capillary Refill: Capillary refill takes less than 2 seconds.   Neurological:      Mental Status: She is alert and oriented to person, place, and time.       Assessment/Plan:     Diagnosis and associated orders:     1. Nonspecific syndrome suggestive of viral illness  POCT SARS-COV Antigen CHANDNI (Symptomatic only)    COVID/SARS CoV-2 PCR    POCT Rapid Strep A         Comments/MDM:     Point-of-care COVID and strep testing negative.  We will send out PCR although the patient is within a 12 to 24-hour window of symptoms where this testing is likely unreliable and likely to yield a false negative result.  Encouraged isolation and repeated antigen testing at home if her PCR test is negative.  No signs of secondary bacterial consolidation.  Discussed indications for repeat evaluation, ER precautions         Differential diagnosis, natural history, supportive care, and indications for immediate follow-up discussed.    Advised the patient to follow-up with the primary care physician for recheck, reevaluation, and consideration of further management.    Please note that this dictation was created using voice recognition software. I have made a reasonable attempt to correct obvious errors, but I expect that there are errors of grammar and possibly content that I did not discover before finalizing the note.    This note was electronically signed by Aureliano Castaneda PA-C

## 2022-08-28 LAB
SARS-COV-2 RNA RESP QL NAA+PROBE: NOTDETECTED
SPECIMEN SOURCE: NORMAL

## 2022-08-31 ENCOUNTER — OFFICE VISIT (OUTPATIENT)
Dept: URGENT CARE | Facility: CLINIC | Age: 37
End: 2022-08-31
Payer: COMMERCIAL

## 2022-08-31 DIAGNOSIS — J02.9 PHARYNGITIS, UNSPECIFIED ETIOLOGY: ICD-10-CM

## 2022-08-31 DIAGNOSIS — J02.0 STREP PHARYNGITIS: ICD-10-CM

## 2022-08-31 LAB
EXTERNAL QUALITY CONTROL: NORMAL
INT CON NEG: NORMAL
INT CON NEG: NORMAL
INT CON POS: NORMAL
INT CON POS: NORMAL
S PYO AG THROAT QL: POSITIVE
SARS-COV+SARS-COV-2 AG RESP QL IA.RAPID: NEGATIVE

## 2022-08-31 PROCEDURE — 87880 STREP A ASSAY W/OPTIC: CPT | Performed by: NURSE PRACTITIONER

## 2022-08-31 PROCEDURE — 99213 OFFICE O/P EST LOW 20 MIN: CPT | Performed by: NURSE PRACTITIONER

## 2022-08-31 PROCEDURE — 87426 SARSCOV CORONAVIRUS AG IA: CPT | Performed by: NURSE PRACTITIONER

## 2022-08-31 RX ORDER — AMOXICILLIN 500 MG/1
500 CAPSULE ORAL 2 TIMES DAILY
Qty: 20 CAPSULE | Refills: 0 | Status: SHIPPED | OUTPATIENT
Start: 2022-08-31 | End: 2022-09-10

## 2022-08-31 RX ORDER — METHOCARBAMOL 500 MG/1
500 TABLET, FILM COATED ORAL 3 TIMES DAILY
COMMUNITY
Start: 2022-08-17 | End: 2022-12-30

## 2022-08-31 ASSESSMENT — ENCOUNTER SYMPTOMS
WHEEZING: 0
NAUSEA: 0
VOMITING: 0
SORE THROAT: 1
DIARRHEA: 0
SHORTNESS OF BREATH: 0
HEADACHES: 1
FEVER: 0
COUGH: 0

## 2022-08-31 ASSESSMENT — FIBROSIS 4 INDEX: FIB4 SCORE: 0.81

## 2022-08-31 NOTE — PROGRESS NOTES
"  Subjective:     Mckenzie Cash is a 37 y.o. female who presents for Pharyngitis (\"Started Friday, recent strep exposure from daughter. Headache. Was tested Saturday for Covid and strep, both were negative.\" )      Symptoms started on Friday. Daughter was diagnosed with strep throat. Plugged ears. Taking an OTC allergy medication.     Pharyngitis   This is a new problem. The current episode started in the past 7 days. The pain is at a severity of 6/10. The pain is moderate. Associated symptoms include headaches. Pertinent negatives include no coughing, diarrhea, ear pain, shortness of breath or vomiting. She has tried NSAIDs for the symptoms.     Past Medical History:   Diagnosis Date    Generalized headaches     Other ovarian cyst, right side     Pelvic pain in female 10/02/2017    2/10    Plantar fasciitis     Tendonitis        Past Surgical History:   Procedure Laterality Date    PELVISCOPY N/A 10/4/2017    Procedure: PELVISCOPY;  Surgeon: Eliza Carrizales M.D.;  Location: SURGERY [facility];  Service: Gynecology    CARPAL TUNNEL RELEASE Right 01/2017    LAPAROSCOPY  7/22/2011    Performed by ROSS VELAZQUEZ at SURGERY [facility]       Social History     Socioeconomic History    Marital status: Single     Spouse name: Not on file    Number of children: Not on file    Years of education: Not on file    Highest education level: Not on file   Occupational History    Not on file   Tobacco Use    Smoking status: Never    Smokeless tobacco: Never   Vaping Use    Vaping Use: Never used   Substance and Sexual Activity    Alcohol use: Not Currently    Drug use: No    Sexual activity: Not on file   Other Topics Concern    Not on file   Social History Narrative    ** Merged History Encounter **          Social Determinants of Health     Financial Resource Strain: Not on file   Food Insecurity: Not on file   Transportation Needs: Not on file   Physical Activity: Not on file   Stress: Not on file " "  Social Connections: Not on file   Intimate Partner Violence: Not on file   Housing Stability: Not on file        Family History   Adopted: Yes        No Known Allergies    Review of Systems   Constitutional:  Positive for malaise/fatigue. Negative for fever.   HENT:  Positive for sore throat. Negative for ear pain.    Respiratory:  Negative for cough, shortness of breath and wheezing.    Gastrointestinal:  Negative for diarrhea, nausea and vomiting.   Skin:  Negative for rash.   Neurological:  Positive for headaches.   Endo/Heme/Allergies:  Positive for environmental allergies.   All other systems reviewed and are negative.     Objective:   BP (P) 114/72 (BP Location: Right arm, Patient Position: Sitting, BP Cuff Size: Adult)   Pulse (P) 74   Temp (P) 36.7 °C (98 °F) (Temporal)   Resp (P) 16   Ht (P) 1.702 m (5' 7\")   Wt (P) 76.3 kg (168 lb 3.2 oz)   LMP  (LMP Unknown)   SpO2 (P) 99%   Breastfeeding No   BMI (P) 26.34 kg/m²     Physical Exam  Vitals reviewed.   Constitutional:       General: She is not in acute distress.     Appearance: She is well-developed. She is not toxic-appearing.   HENT:      Head: Normocephalic and atraumatic.      Right Ear: External ear normal.      Left Ear: External ear normal.      Nose: Mucosal edema present.      Mouth/Throat:      Lips: Pink.      Mouth: Mucous membranes are moist. No oral lesions.      Pharynx: Posterior oropharyngeal erythema present.      Tonsils: 0 on the right. 0 on the left.   Eyes:      Conjunctiva/sclera: Conjunctivae normal.   Cardiovascular:      Rate and Rhythm: Normal rate.   Pulmonary:      Effort: Pulmonary effort is normal. No respiratory distress.      Breath sounds: Normal breath sounds. No stridor. No wheezing, rhonchi or rales.   Musculoskeletal:         General: Normal range of motion.      Cervical back: Normal range of motion and neck supple.   Skin:     General: Skin is warm and dry.      Findings: No rash.   Neurological:      " General: No focal deficit present.      Mental Status: She is alert and oriented to person, place, and time.      GCS: GCS eye subscore is 4. GCS verbal subscore is 5. GCS motor subscore is 6.   Psychiatric:         Mood and Affect: Mood normal.         Speech: Speech normal.         Behavior: Behavior normal.         Thought Content: Thought content normal.         Judgment: Judgment normal.       Assessment/Plan:   1. Strep pharyngitis  - amoxicillin (AMOXIL) 500 MG Cap; Take 1 Capsule by mouth 2 times a day for 10 days.  Dispense: 20 Capsule; Refill: 0    2. Pharyngitis, unspecified etiology  - POCT SARS-COV Antigen CHANDNI (Symptomatic only)  - POCT Rapid Strep A  Results for orders placed or performed in visit on 08/31/22   POCT SARS-COV Antigen CHANDNI (Symptomatic only)   Result Value Ref Range    Internal  Valid     SARS-COV ANTIGEN CHANDNI Negative Negative, Indeterminate, None Detected, Not Detected, Detected, NotDetected, Valid, Invalid, Pass    Internal Control Positive Valid     Internal Control Negative Valid    POCT Rapid Strep A   Result Value Ref Range    Rapid Strep Screen Positive     Internal Control Positive Valid     Internal Control Negative Valid    -Take antibiotic as directed.  -Oral Hydration.  -Warm salt water gargles.  -OTC Throat lozenges or spray (Cepacol).  -Tylenol and Motrin as directed for pain and fever.  -Hand Hygiene: Wash hands frequently with soap and water.  -Throw away toothbrush after 24 hrs on antibiotics, replace with new one.    Follow up for persistent throat pain, increased swelling, persistent fevers, difficulty swallowing, shortness of breath, weakness, elevated heart rate, or any other concerns.     Differential diagnosis, natural history, supportive care, and indications for immediate follow-up discussed.

## 2022-08-31 NOTE — PATIENT INSTRUCTIONS
-Take antibiotic as directed.  -Oral Hydration.  -Warm salt water gargles.  -OTC Throat lozenges or spray (Cepacol).  -Tylenol and Motrin as directed for pain and fever.  -Hand Hygiene: Wash hands frequently with soap and water.  -Throw away toothbrush after 24 hrs on antibiotics, replace with new one.    Follow up for persistent throat pain, increased swelling, persistent fevers, difficulty swallowing, shortness of breath, weakness, elevated heart rate, or any other concerns.

## 2022-09-11 ENCOUNTER — HOSPITAL ENCOUNTER (OUTPATIENT)
Dept: RADIOLOGY | Facility: MEDICAL CENTER | Age: 37
End: 2022-09-11
Attending: PHYSICIAN ASSISTANT
Payer: COMMERCIAL

## 2022-09-11 DIAGNOSIS — G57.01 LESION OF SCIATIC NERVE, RIGHT SIDE: ICD-10-CM

## 2022-09-11 PROCEDURE — 72195 MRI PELVIS W/O DYE: CPT

## 2022-11-14 ENCOUNTER — HOSPITAL ENCOUNTER (OUTPATIENT)
Facility: MEDICAL CENTER | Age: 37
End: 2022-11-14
Attending: NURSE PRACTITIONER
Payer: COMMERCIAL

## 2022-11-14 ENCOUNTER — OFFICE VISIT (OUTPATIENT)
Dept: URGENT CARE | Facility: PHYSICIAN GROUP | Age: 37
End: 2022-11-14
Payer: COMMERCIAL

## 2022-11-14 VITALS
OXYGEN SATURATION: 96 % | RESPIRATION RATE: 12 BRPM | HEIGHT: 67 IN | HEART RATE: 92 BPM | TEMPERATURE: 98.5 F | WEIGHT: 177 LBS | BODY MASS INDEX: 27.78 KG/M2 | DIASTOLIC BLOOD PRESSURE: 66 MMHG | SYSTOLIC BLOOD PRESSURE: 114 MMHG

## 2022-11-14 DIAGNOSIS — R11.2 NAUSEA AND VOMITING IN ADULT: ICD-10-CM

## 2022-11-14 DIAGNOSIS — J02.9 SORETHROAT: ICD-10-CM

## 2022-11-14 DIAGNOSIS — M79.10 MYALGIA: ICD-10-CM

## 2022-11-14 DIAGNOSIS — R50.9 FEVER IN ADULT: ICD-10-CM

## 2022-11-14 DIAGNOSIS — R53.83 OTHER FATIGUE: ICD-10-CM

## 2022-11-14 DIAGNOSIS — Z20.818 EXPOSURE TO STREPTOCOCCAL PHARYNGITIS: ICD-10-CM

## 2022-11-14 LAB
INT CON NEG: NORMAL
INT CON POS: NORMAL
S PYO AG THROAT QL: NEGATIVE

## 2022-11-14 PROCEDURE — 99214 OFFICE O/P EST MOD 30 MIN: CPT | Mod: CS | Performed by: NURSE PRACTITIONER

## 2022-11-14 PROCEDURE — 87070 CULTURE OTHR SPECIMN AEROBIC: CPT

## 2022-11-14 PROCEDURE — 87880 STREP A ASSAY W/OPTIC: CPT | Performed by: NURSE PRACTITIONER

## 2022-11-14 RX ORDER — TIZANIDINE 4 MG/1
4 TABLET ORAL EVERY 6 HOURS PRN
COMMUNITY
Start: 2022-11-10

## 2022-11-14 RX ORDER — ONDANSETRON 4 MG/1
4 TABLET, FILM COATED ORAL EVERY 4 HOURS PRN
Qty: 20 TABLET | Refills: 0 | Status: SHIPPED | OUTPATIENT
Start: 2022-11-14 | End: 2022-12-30

## 2022-11-14 RX ORDER — LIDOCAINE HYDROCHLORIDE 20 MG/ML
5 SOLUTION OROPHARYNGEAL 4 TIMES DAILY PRN
Qty: 120 ML | Refills: 0 | Status: SHIPPED | OUTPATIENT
Start: 2022-11-14 | End: 2022-12-30

## 2022-11-14 ASSESSMENT — FIBROSIS 4 INDEX: FIB4 SCORE: 0.81

## 2022-11-14 NOTE — LETTER
November 14, 2022    Mckenzie Shailesh    Your employee/student was seen in our clinic today.  A concern for COVID-19 has been identified and testing is in progress. We are asking you to excuse absences while following self-isolation protocol per Center for Disease Control (CDC) guidelines.  The patient will be able to access test results through our electronic delivery system called [employer].     If the results of testing are positive then the patient should follow the CDC guidelines.  These are isolation for minimum of 5 days and at least 24 hours have passed since the last fever without the use of fever reducing medications and all other symptoms have improved. Please excuse any missed days of work due to this acute medical condition.    In general, repeat testing is not necessary and not offered through our [facility]. This is the only note that will be provided from [facility] for this visit.  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 If you have any questions please do not hesitate to call me at the phone number listed below.    Sincerely,    Britni Silverman, APRN  [phone]  Electronically Signed

## 2022-11-15 ENCOUNTER — OFFICE VISIT (OUTPATIENT)
Dept: URGENT CARE | Facility: PHYSICIAN GROUP | Age: 37
End: 2022-11-15
Payer: COMMERCIAL

## 2022-11-15 ENCOUNTER — HOSPITAL ENCOUNTER (OUTPATIENT)
Facility: MEDICAL CENTER | Age: 37
End: 2022-11-15
Attending: NURSE PRACTITIONER
Payer: COMMERCIAL

## 2022-11-15 VITALS
OXYGEN SATURATION: 98 % | HEIGHT: 67 IN | WEIGHT: 173.6 LBS | HEART RATE: 98 BPM | TEMPERATURE: 97.6 F | RESPIRATION RATE: 16 BRPM | SYSTOLIC BLOOD PRESSURE: 110 MMHG | DIASTOLIC BLOOD PRESSURE: 62 MMHG | BODY MASS INDEX: 27.25 KG/M2

## 2022-11-15 DIAGNOSIS — Z20.818 EXPOSURE TO STREPTOCOCCAL PHARYNGITIS: ICD-10-CM

## 2022-11-15 DIAGNOSIS — R52 BODY ACHES: ICD-10-CM

## 2022-11-15 DIAGNOSIS — J02.9 SORETHROAT: ICD-10-CM

## 2022-11-15 DIAGNOSIS — M79.10 MYALGIA: ICD-10-CM

## 2022-11-15 DIAGNOSIS — R50.9 FEVER, UNSPECIFIED FEVER CAUSE: ICD-10-CM

## 2022-11-15 DIAGNOSIS — R50.9 FEVER IN ADULT: ICD-10-CM

## 2022-11-15 DIAGNOSIS — R53.83 OTHER FATIGUE: ICD-10-CM

## 2022-11-15 DIAGNOSIS — R11.2 NAUSEA AND VOMITING IN ADULT: ICD-10-CM

## 2022-11-15 LAB
FLUAV+FLUBV AG SPEC QL IA: NEGATIVE
INT CON NEG: NORMAL
INT CON POS: NORMAL

## 2022-11-15 PROCEDURE — 87804 INFLUENZA ASSAY W/OPTIC: CPT | Performed by: NURSE PRACTITIONER

## 2022-11-15 PROCEDURE — U0003 INFECTIOUS AGENT DETECTION BY NUCLEIC ACID (DNA OR RNA); SEVERE ACUTE RESPIRATORY SYNDROME CORONAVIRUS 2 (SARS-COV-2) (CORONAVIRUS DISEASE [COVID-19]), AMPLIFIED PROBE TECHNIQUE, MAKING USE OF HIGH THROUGHPUT TECHNOLOGIES AS DESCRIBED BY CMS-2020-01-R: HCPCS

## 2022-11-15 PROCEDURE — 99214 OFFICE O/P EST MOD 30 MIN: CPT | Performed by: NURSE PRACTITIONER

## 2022-11-15 PROCEDURE — U0005 INFEC AGEN DETEC AMPLI PROBE: HCPCS

## 2022-11-15 ASSESSMENT — ENCOUNTER SYMPTOMS
FEVER: 1
DIZZINESS: 0
ABDOMINAL PAIN: 0
HEADACHES: 0
WHEEZING: 0
SORE THROAT: 1
COUGH: 1
DIARRHEA: 0
SHORTNESS OF BREATH: 0
CARDIOVASCULAR NEGATIVE: 1
SINUS PAIN: 0
VOMITING: 0
NAUSEA: 1
CHILLS: 0
MYALGIAS: 1

## 2022-11-15 ASSESSMENT — FIBROSIS 4 INDEX: FIB4 SCORE: 0.81

## 2022-11-15 NOTE — PROGRESS NOTES
"Subjective:   Mckenzie Cash is a 37 y.o. female who presents for Sore Throat (Fever (99.6 F), headache, body aches, nausea, son pos for strep, onset this morning )       HPI  Patient presents for evaluation of an approximate 2-hour history of fever with a T-max of 9.6, headache, sore throat, myalgia, and fatigue.  Patient states that her son recent tested positive for group A strep, is concerned she may have contracted it as well.  He has taken over-the-counter Advil with mild to moderate relief of her symptoms.    ROS  All other systems are negative except as documented above within HPI.    MEDS:   Current Outpatient Medications:     tizanidine (ZANAFLEX) 4 MG Tab, , Disp: , Rfl:     methocarbamol (ROBAXIN) 500 MG Tab, Take 1 Tablet by mouth 3 times a day., Disp: , Rfl:     diclofenac sodium (VOLTAREN) 1 % Gel, APPLY 4 GRAMS TOPICALLY TO AFFECTED AREA 4 TIMES DAILY, Disp: , Rfl:     gabapentin (NEURONTIN) 100 MG Cap, TAKE 1 CAPSULE BY MOUTH THREE TIMES DAILY FOR 3 DAYS, THEN 2 CAPS THREE TIMES DAILY FOR 3 DAYS, THEN 3 CAPS THREE TIMES DAILY THEREAFTER, Disp: , Rfl:     metoprolol tartrate (LOPRESSOR) 25 MG Tab, Take 1 Tablet by mouth 2 times a day., Disp: 180 Tablet, Rfl: 7    Levocetirizine Dihydrochloride 5 MG Tab, Take 1 Tablet by mouth 2 times a day as needed., Disp: , Rfl:   ALLERGIES: No Known Allergies    Patient's PMH, SocHx, SurgHx, FamHx, Drug allergies and medications were reviewed.     Objective:   /66 (BP Location: Right arm, Patient Position: Sitting, BP Cuff Size: Adult)   Pulse 92   Temp 36.9 °C (98.5 °F) (Temporal)   Resp 12   Ht 1.702 m (5' 7\")   Wt 80.3 kg (177 lb)   SpO2 96%   BMI 27.72 kg/m²     Physical Exam  Vitals and nursing note reviewed.   Constitutional:       General: She is awake.      Appearance: Normal appearance. She is well-developed.   HENT:      Head: Normocephalic and atraumatic.      Right Ear: Tympanic membrane, ear canal and external ear normal.      " Left Ear: Tympanic membrane, ear canal and external ear normal.      Nose: Nose normal. No nasal tenderness, mucosal edema, congestion or rhinorrhea.      Right Turbinates: Enlarged.      Left Turbinates: Enlarged.      Mouth/Throat:      Lips: Pink.      Mouth: Mucous membranes are moist.      Pharynx: Oropharynx is clear. Uvula midline. Posterior oropharyngeal erythema present.   Eyes:      Extraocular Movements: Extraocular movements intact.      Conjunctiva/sclera: Conjunctivae normal.   Cardiovascular:      Rate and Rhythm: Normal rate and regular rhythm.      Pulses: Normal pulses.      Heart sounds: Normal heart sounds.   Pulmonary:      Effort: Pulmonary effort is normal.      Breath sounds: Normal breath sounds.   Musculoskeletal:         General: Normal range of motion.      Cervical back: Normal range of motion and neck supple.   Skin:     General: Skin is warm and dry.   Neurological:      General: No focal deficit present.      Mental Status: She is alert and oriented to person, place, and time.   Psychiatric:         Mood and Affect: Mood normal.         Behavior: Behavior normal. Behavior is cooperative.         Thought Content: Thought content normal.         Judgment: Judgment normal.       Assessment/Plan:   Assessment    1. Exposure to Streptococcal pharyngitis  - POCT Rapid Strep A  - lidocaine (XYLOCAINE) 2 % Solution; Take 5 mL by mouth 4 times a day as needed for Throat/Mouth Pain.  Dispense: 120 mL; Refill: 0  - CULTURE THROAT; Future    2. Fever in adult  - POCT Rapid Strep A  - CULTURE THROAT; Future  - SARS-CoV-2 PCR (24 hour In-House): Collect NP swab in VTM; Future    3. Nausea and vomiting in adult  - POCT Rapid Strep A  - CULTURE THROAT; Future  - ondansetron (ZOFRAN) 4 MG Tab tablet; Take 1 Tablet by mouth every four hours as needed for Nausea/Vomiting.  Dispense: 20 Tablet; Refill: 0  - SARS-CoV-2 PCR (24 hour In-House): Collect NP swab in VTM; Future    4. Other fatigue  - POCT  Rapid Strep A  - CULTURE THROAT; Future  - SARS-CoV-2 PCR (24 hour In-House): Collect NP swab in VTM; Future    5. Myalgia  - POCT Rapid Strep A  - CULTURE THROAT; Future  - SARS-CoV-2 PCR (24 hour In-House): Collect NP swab in VTM; Future    6. Sorethroat  - lidocaine (XYLOCAINE) 2 % Solution; Take 5 mL by mouth 4 times a day as needed for Throat/Mouth Pain.  Dispense: 120 mL; Refill: 0  - CULTURE THROAT; Future  - SARS-CoV-2 PCR (24 hour In-House): Collect NP swab in VTM; Future      Vital signs stable at today's acute urgent care visit.  Reviewed test results completed in clinic.  Will obtain PCR COVID testing as well as throat culture.  Advised to home isolate per CDC guidelines.  Supportive care options also discussed, to include alternating Tylenol and Advil, cough/cold/flu OTC medications for symptomatic relief, in addition to rest, fluids as tolerated. Differential diagnosis, natural history, and indications for immediate follow-up were discussed.     Advised the patient to follow-up with the primary care provider for recheck, reevaluation, and/or consideration of further management if necessary. Return to urgent care with any worsening symptoms or if there is no improvement in their current condition. Red flags discussed and indications to immediately call 911 or present to the Emergency Department.  All questions were encouraged and answered to the patient's satisfaction and understanding, and they agree to the plan of care.     I personally reviewed prior external notes and test results pertinent to today's visit.  I have independently reviewed and interpreted all diagnostics ordered during this urgent care acute visit. Time spent evaluating this patient was a greater than 30 minutes and includes preparing for visit, counseling/education, exam and evaluation, obtaining history, independent interpretation and ordering lab/test/procedures.      Please note that this dictation was created using voice  recognition software. I have made a reasonable attempt to correct obvious errors, but I expect that there are errors of grammar and possibly content that I did not discover before finalizing the note.

## 2022-11-15 NOTE — PROGRESS NOTES
Subjective     Mckenzie Cash is a 37 y.o. female who presents with Cough (Body aches, sore throat, sinus pain, x1 day )            Cough  Associated symptoms include ear pain, a fever, myalgias and a sore throat. Pertinent negatives include no chills, headaches, shortness of breath or wheezing. States has been experiencing body aches, sore throat and nasal congestion.  Seen yesterday in urgent care for similar symptoms:  rapid strep test: negative, has pending throat culture at this time and does have order for PCR COVID testing.  Children are being treated for strep throat. Fever 100.1 yesterday. Denies nausea/vomiting or diarrhea. Given Lidocaine, zofran at yesterday's visit.     PMH:  has a past medical history of Generalized headaches, Other ovarian cyst, right side, Pelvic pain in female (10/02/2017), Plantar fasciitis, and Tendonitis.  MEDS:   Current Outpatient Medications:     tizanidine (ZANAFLEX) 4 MG Tab, , Disp: , Rfl:     lidocaine (XYLOCAINE) 2 % Solution, Take 5 mL by mouth 4 times a day as needed for Throat/Mouth Pain., Disp: 120 mL, Rfl: 0    ondansetron (ZOFRAN) 4 MG Tab tablet, Take 1 Tablet by mouth every four hours as needed for Nausea/Vomiting., Disp: 20 Tablet, Rfl: 0    methocarbamol (ROBAXIN) 500 MG Tab, Take 1 Tablet by mouth 3 times a day., Disp: , Rfl:     diclofenac sodium (VOLTAREN) 1 % Gel, APPLY 4 GRAMS TOPICALLY TO AFFECTED AREA 4 TIMES DAILY, Disp: , Rfl:     gabapentin (NEURONTIN) 100 MG Cap, TAKE 1 CAPSULE BY MOUTH THREE TIMES DAILY FOR 3 DAYS, THEN 2 CAPS THREE TIMES DAILY FOR 3 DAYS, THEN 3 CAPS THREE TIMES DAILY THEREAFTER, Disp: , Rfl:     metoprolol tartrate (LOPRESSOR) 25 MG Tab, Take 1 Tablet by mouth 2 times a day., Disp: 180 Tablet, Rfl: 7    Levocetirizine Dihydrochloride 5 MG Tab, Take 1 Tablet by mouth 2 times a day as needed., Disp: , Rfl:   ALLERGIES: No Known Allergies  SURGHX:   Past Surgical History:   Procedure Laterality Date    PELVISCOPY N/A  "10/4/2017    Procedure: PELVISCOPY;  Surgeon: Eliza Carrizales M.D.;  Location: SURGERY [facility];  Service: Gynecology    CARPAL TUNNEL RELEASE Right 01/2017    LAPAROSCOPY  7/22/2011    Performed by ROSS VELAZQUEZ at SURGERY [facility]     SOCHX:  reports that she has never smoked. She has never used smokeless tobacco. She reports that she does not currently use alcohol. She reports that she does not use drugs.  FH: Family history was reviewed, no pertinent findings to report      Review of Systems   Constitutional:  Positive for fever and malaise/fatigue. Negative for chills.   HENT:  Positive for congestion, ear pain and sore throat. Negative for sinus pain.    Respiratory:  Positive for cough. Negative for shortness of breath and wheezing.    Cardiovascular: Negative.    Gastrointestinal:  Positive for nausea. Negative for abdominal pain, diarrhea and vomiting.   Musculoskeletal:  Positive for myalgias.   Neurological:  Negative for dizziness and headaches.   All other systems reviewed and are negative.           Objective     There were no vitals taken for this visit.   Vitals:    11/15/22 1304   BP: 110/62   BP Location: Left arm   Patient Position: Sitting   BP Cuff Size: Adult   Pulse: 98   Resp: 16   Temp: 36.4 °C (97.6 °F)   TempSrc: Temporal   SpO2: 98%   Weight: 78.7 kg (173 lb 9.6 oz)   Height: 1.702 m (5' 7\")       Physical Exam  Vitals reviewed.   Constitutional:       General: She is awake. She is not in acute distress.     Appearance: She is well-developed. She is not ill-appearing, toxic-appearing or diaphoretic.      Comments: Appears fatigued.    HENT:      Head: Normocephalic.      Right Ear: Ear canal and external ear normal. A middle ear effusion is present.      Left Ear: Ear canal and external ear normal. A middle ear effusion is present.      Nose: Congestion present. No rhinorrhea.      Mouth/Throat:      Lips: Pink.      Mouth: Mucous membranes are dry.      Pharynx: " Uvula midline. Posterior oropharyngeal erythema present. No pharyngeal swelling, oropharyngeal exudate or uvula swelling.      Tonsils: No tonsillar exudate or tonsillar abscesses.   Eyes:      Conjunctiva/sclera: Conjunctivae normal.      Pupils: Pupils are equal, round, and reactive to light.   Cardiovascular:      Rate and Rhythm: Normal rate.   Pulmonary:      Effort: Pulmonary effort is normal.      Breath sounds: Normal breath sounds and air entry. No stridor, decreased air movement or transmitted upper airway sounds. No decreased breath sounds, wheezing, rhonchi or rales.   Musculoskeletal:         General: Normal range of motion.      Cervical back: Normal range of motion and neck supple.   Skin:     General: Skin is warm and dry.   Neurological:      Mental Status: She is alert and oriented to person, place, and time.   Psychiatric:         Attention and Perception: Attention normal.         Mood and Affect: Mood normal.         Speech: Speech normal.         Behavior: Behavior normal. Behavior is cooperative.                           Assessment & Plan        1. Fever, unspecified fever cause    - POCT Influenza A/B    2. Body aches    - POCT Influenza A/B    -Patient to be swabbed for PCR COVID test ordered yesterday from previous provider  -Throat culture pending from previous provider  -May continue lidocaine as a numbing agent for sore throat  -Continue Zofran as needed for nausea    -Maintain hydration/water intake  -May use over the counter Ibuprofen/Tylenol as needed for any fever, body aches or throat pain  -May take long acting antihistamine for seasonal allergy symptoms as needed  -May use over the counter saline nasal spray for nasal congestion as needed  -May use over the counter Nasacort/Flonase for nasal congestion as needed   -May use throat lozenges for throat discomfort as needed   -Change toothbrush after 24 hrs   -May gargle with salt water up to 4x/day as needed for throat discomfort (1  tsp salt dissolved in 1 cup warm water)  -May drink smoothies for nutrition if too painful to swallow solid foods  -Monitor for any sinus pain/pressure with sinus congestion with thick mucus production, sinus headache, cough, shortness of breath, fever- need re-evaluation    -Education provided: see above    -Return to urgent care as needed if new or worsening symptoms  -Patient expresses understanding to treatment and plan of care  -Questions were encouraged and answered  -Recommended over the counter meds were written down when requested   -Advised to follow-up with the primary care provider for recheck, reevaluation, and consideration of further management as needed     -Time spent evaluating this patient was at least 30 minutes. This time comprises of the following: preparing for visit/chart review, patient history taken into account pertinent to symptoms, patient counseling/education for needed treatment outpatient, exam/evaluation/treatment plan provided, ordering any appropriate lab/test/procedures/meds, independent interpretation of any clinic testing, medication management with any other listed medications and chart documentation.

## 2022-11-16 DIAGNOSIS — M79.10 MYALGIA: ICD-10-CM

## 2022-11-16 DIAGNOSIS — R53.83 OTHER FATIGUE: ICD-10-CM

## 2022-11-16 DIAGNOSIS — R11.2 NAUSEA AND VOMITING IN ADULT: ICD-10-CM

## 2022-11-16 DIAGNOSIS — J02.9 SORETHROAT: ICD-10-CM

## 2022-11-16 DIAGNOSIS — R50.9 FEVER IN ADULT: ICD-10-CM

## 2022-11-16 LAB
SARS-COV-2 RNA RESP QL NAA+PROBE: NOTDETECTED
SPECIMEN SOURCE: NORMAL

## 2022-12-26 ENCOUNTER — HOSPITAL ENCOUNTER (OUTPATIENT)
Facility: MEDICAL CENTER | Age: 37
End: 2022-12-26
Attending: FAMILY MEDICINE
Payer: COMMERCIAL

## 2022-12-26 ENCOUNTER — OFFICE VISIT (OUTPATIENT)
Dept: URGENT CARE | Facility: CLINIC | Age: 37
End: 2022-12-26
Payer: COMMERCIAL

## 2022-12-26 VITALS
HEIGHT: 67 IN | BODY MASS INDEX: 25.11 KG/M2 | WEIGHT: 160 LBS | RESPIRATION RATE: 16 BRPM | HEART RATE: 91 BPM | TEMPERATURE: 98.3 F | SYSTOLIC BLOOD PRESSURE: 128 MMHG | OXYGEN SATURATION: 98 % | DIASTOLIC BLOOD PRESSURE: 88 MMHG

## 2022-12-26 DIAGNOSIS — Z03.818 ENCOUNTER FOR PATIENT CONCERN ABOUT EXPOSURE TO INFECTIOUS ORGANISM: ICD-10-CM

## 2022-12-26 LAB
INT CON NEG: NORMAL
INT CON POS: NORMAL
S PYO AG THROAT QL: NEGATIVE

## 2022-12-26 PROCEDURE — 0240U HCHG SARS-COV-2 COVID-19 NFCT DS RESP RNA 3 TRGT MIC: CPT

## 2022-12-26 PROCEDURE — 87880 STREP A ASSAY W/OPTIC: CPT | Performed by: FAMILY MEDICINE

## 2022-12-26 PROCEDURE — 99213 OFFICE O/P EST LOW 20 MIN: CPT | Mod: CS | Performed by: FAMILY MEDICINE

## 2022-12-26 ASSESSMENT — FIBROSIS 4 INDEX: FIB4 SCORE: 0.81

## 2022-12-27 NOTE — PROGRESS NOTES
"  Subjective:      37 y.o. female presents to urgent care for symptoms that started last night.  She is experiencing headache, body ache, ear pain, and sore throat.  No fever or diarrhea.  She has been using ibuprofen with good relief in symptoms. She denies any tobacco product use.  No history of asthma or COPD.  She is fully vaccinated against COVID.  No known sick contacts    She denies any other questions or concerns at this time.    Current problem list, medication, and past medical/surgical history were reviewed in Epic.    ROS  See HPI     Objective:      /88   Pulse 91   Temp 36.8 °C (98.3 °F) (Temporal)   Resp 16   Ht 1.702 m (5' 7\")   Wt 72.6 kg (160 lb)   SpO2 98%   BMI 25.06 kg/m²     Physical Exam  Constitutional:       General: She is not in acute distress.     Appearance: She is not diaphoretic.   HENT:      Right Ear: Tympanic membrane, ear canal and external ear normal.      Left Ear: Tympanic membrane, ear canal and external ear normal.      Mouth/Throat:      Tongue: Tongue does not deviate from midline.      Palate: No lesions.      Pharynx: Uvula midline. Posterior oropharyngeal erythema present.      Tonsils: No tonsillar exudate. 0 on the right. 0 on the left.   Cardiovascular:      Rate and Rhythm: Normal rate and regular rhythm.      Heart sounds: Normal heart sounds.   Pulmonary:      Effort: Pulmonary effort is normal. No respiratory distress.      Breath sounds: Normal breath sounds.   Neurological:      Mental Status: She is alert.   Psychiatric:         Mood and Affect: Affect normal.         Judgment: Judgment normal.     Assessment/Plan:     1. Encounter for patient concern about exposure to infectious organism  Rapid strep negative.  PCR flu and COVID testing has been sent. In the meantime patient was advised to isolate until COVID test results returned. I encouraged mask use, frequent handwashing, wiping down hard surfaces, etc. Tylenol and Ibuprofen were recommended " for symptomatic relief. If positive they will be contacted by their local health department regarding return to work/school protocols. Patient is currently without indication of need for higher level of care. Patient/Guardian was given precautionary signs/symptoms that mandate immediate follow up and evaluation in the ED. The patient and/or guardian demonstrated a good understanding and agreed with the treatment plan.  - POCT Rapid Strep A  - CoV-2 and Flu A/B by PCR (24 hour In-House): Collect NP swab in VTM; Future      Instructed to return to Urgent Care or nearest Emergency Department if symptoms fail to improve, for any change in condition, further concerns, or new concerning symptoms. Patient states understanding of the plan of care and discharge instructions.    Karishma Carter M.D.

## 2022-12-28 DIAGNOSIS — Z03.818 ENCOUNTER FOR PATIENT CONCERN ABOUT EXPOSURE TO INFECTIOUS ORGANISM: ICD-10-CM

## 2022-12-28 LAB
FLUAV RNA SPEC QL NAA+PROBE: NEGATIVE
FLUBV RNA SPEC QL NAA+PROBE: NEGATIVE
SARS-COV-2 RNA RESP QL NAA+PROBE: NOTDETECTED
SPECIMEN SOURCE: NORMAL

## 2022-12-30 ENCOUNTER — OFFICE VISIT (OUTPATIENT)
Dept: URGENT CARE | Facility: PHYSICIAN GROUP | Age: 37
End: 2022-12-30
Payer: COMMERCIAL

## 2022-12-30 VITALS
DIASTOLIC BLOOD PRESSURE: 60 MMHG | HEIGHT: 67 IN | SYSTOLIC BLOOD PRESSURE: 108 MMHG | TEMPERATURE: 98.1 F | HEART RATE: 102 BPM | OXYGEN SATURATION: 98 % | WEIGHT: 160 LBS | RESPIRATION RATE: 16 BRPM | BODY MASS INDEX: 25.11 KG/M2

## 2022-12-30 DIAGNOSIS — J32.9 RHINOSINUSITIS: ICD-10-CM

## 2022-12-30 DIAGNOSIS — J02.9 PHARYNGITIS, UNSPECIFIED ETIOLOGY: ICD-10-CM

## 2022-12-30 LAB
INT CON NEG: NORMAL
INT CON POS: NORMAL
S PYO AG THROAT QL: NEGATIVE

## 2022-12-30 PROCEDURE — 99213 OFFICE O/P EST LOW 20 MIN: CPT | Performed by: FAMILY MEDICINE

## 2022-12-30 PROCEDURE — 87880 STREP A ASSAY W/OPTIC: CPT | Performed by: FAMILY MEDICINE

## 2022-12-30 RX ORDER — PSEUDOEPHEDRINE HCL 30 MG
60 TABLET ORAL EVERY 4 HOURS PRN
COMMUNITY
End: 2023-02-03

## 2022-12-30 RX ORDER — AMOXICILLIN AND CLAVULANATE POTASSIUM 875; 125 MG/1; MG/1
1 TABLET, FILM COATED ORAL 2 TIMES DAILY
Qty: 14 TABLET | Refills: 0 | Status: SHIPPED | OUTPATIENT
Start: 2022-12-30 | End: 2023-01-06

## 2022-12-30 ASSESSMENT — ENCOUNTER SYMPTOMS
EYE REDNESS: 0
VOMITING: 0
EYE DISCHARGE: 0
NAUSEA: 0
MYALGIAS: 0
WEIGHT LOSS: 0

## 2022-12-30 ASSESSMENT — FIBROSIS 4 INDEX: FIB4 SCORE: 0.81

## 2022-12-31 NOTE — PROGRESS NOTES
"Subjective     Mckenzie Cash is a 37 y.o. female who presents with Otalgia (X3 days, Sore throat, sinus pain/pressure x4 days)            5 days sinus pressure and drainage. PMH sinusitis. Sore throat and bilateral earache.  PMH recurrent strep throat however this is improved since tonsillectomy.  No fever.  No drainage from ears.  Tolerating fluids with normal urine output.  No cough.  No other aggravating leaving factors.      Review of Systems   Constitutional:  Negative for malaise/fatigue and weight loss.   Eyes:  Negative for discharge and redness.   Gastrointestinal:  Negative for nausea and vomiting.   Musculoskeletal:  Negative for joint pain and myalgias.   Skin:  Negative for itching and rash.            Objective     /60   Pulse (!) 102   Temp 36.7 °C (98.1 °F) (Temporal)   Resp 16   Ht 1.702 m (5' 7\")   Wt 72.6 kg (160 lb)   SpO2 98%   BMI 25.06 kg/m²      Physical Exam  Constitutional:       General: She is not in acute distress.     Appearance: She is well-developed.   HENT:      Head: Normocephalic and atraumatic.      Right Ear: Tympanic membrane normal.      Left Ear: Tympanic membrane normal.      Nose: Congestion present.      Mouth/Throat:      Mouth: Mucous membranes are moist.      Pharynx: Posterior oropharyngeal erythema present.      Comments: PND  Eyes:      Conjunctiva/sclera: Conjunctivae normal.   Cardiovascular:      Rate and Rhythm: Normal rate and regular rhythm.      Heart sounds: Normal heart sounds. No murmur heard.  Pulmonary:      Effort: Pulmonary effort is normal.      Breath sounds: Normal breath sounds. No wheezing.   Musculoskeletal:      Cervical back: Neck supple.   Lymphadenopathy:      Cervical: No cervical adenopathy.   Skin:     General: Skin is warm and dry.      Findings: No rash.   Neurological:      Mental Status: She is alert.                           Assessment & Plan       POCT strep negative    1. Rhinosinusitis  " amoxicillin-clavulanate (AUGMENTIN) 875-125 MG Tab      2. Pharyngitis, unspecified etiology  POCT Rapid Strep A        Differential diagnosis, natural history, supportive care, and indications for immediate follow-up discussed at length.     Nasal saline, decongestant, nasal corticosteroid    Contingent antibiotic prescription given to patient to fill upon meeting criteria of guidelines discussed.

## 2023-01-05 DIAGNOSIS — I49.3 PVC (PREMATURE VENTRICULAR CONTRACTION): ICD-10-CM

## 2023-01-06 NOTE — TELEPHONE ENCOUNTER
Is the patient due for a refill? Yes    Was the patient seen the past year? Yes    Date of last office visit: 3/7/22    Does the patient have an upcoming appointment?  No   If yes, When?     Provider to refill:JOSE F HANCOCK ADD    Does the patients insurance require a 100 day supply?  No

## 2023-01-07 NOTE — TELEPHONE ENCOUNTER
Task deferred to schedulers to schedule for [facility] via staff message.    90 day courtesy refill sent.

## 2023-02-03 ENCOUNTER — OFFICE VISIT (OUTPATIENT)
Dept: CARDIOLOGY | Facility: MEDICAL CENTER | Age: 38
End: 2023-02-03
Payer: COMMERCIAL

## 2023-02-03 VITALS
OXYGEN SATURATION: 98 % | HEIGHT: 67 IN | SYSTOLIC BLOOD PRESSURE: 118 MMHG | HEART RATE: 84 BPM | DIASTOLIC BLOOD PRESSURE: 62 MMHG | BODY MASS INDEX: 28.25 KG/M2 | RESPIRATION RATE: 16 BRPM | WEIGHT: 180 LBS

## 2023-02-03 DIAGNOSIS — U09.9 COVID-19 LONG HAULER MANIFESTING CHRONIC PALPITATIONS: ICD-10-CM

## 2023-02-03 DIAGNOSIS — R00.2 COVID-19 LONG HAULER MANIFESTING CHRONIC PALPITATIONS: ICD-10-CM

## 2023-02-03 DIAGNOSIS — I49.3 PVC (PREMATURE VENTRICULAR CONTRACTION): ICD-10-CM

## 2023-02-03 PROCEDURE — 99214 OFFICE O/P EST MOD 30 MIN: CPT | Performed by: INTERNAL MEDICINE

## 2023-02-03 RX ORDER — METOPROLOL SUCCINATE 50 MG/1
50 TABLET, EXTENDED RELEASE ORAL DAILY
Qty: 100 TABLET | Refills: 3 | Status: SHIPPED | OUTPATIENT
Start: 2023-02-03 | End: 2023-03-30

## 2023-02-03 ASSESSMENT — FIBROSIS 4 INDEX: FIB4 SCORE: 0.81

## 2023-02-04 NOTE — PROGRESS NOTES
"CARDIOLOGY OUTPATIENT FOLLOWUP    PCP: Rogerio Monaco M.D.    1. 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 PVC (premature ventricular contraction)        Mckenzie Cash is doing very well with metoprolol but may be suitable for weaning.  I discussed the hyperadrenergic symptoms that typically accompany beta-blocker discontinuation and encouraged her to attempt a complete wean with tolerance of the symptoms during the weaning.  I did however leave it at her discretion if and when to do this.    Follow up: 1 year    Chief Complaint   Patient presents with    Premature Ventricular Contractions (PVCs)     [facility] DX: PVC (premature ventricular contraction)    Chest Pain     [facility] DX: Other chest pain       History: Mckenzie Cash is a 37 y.o. 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  She has been using beta-blockade with excellent control of symptoms and notices recurrent palpitations when missing a dose.  These are probably corrected with redosing.  Occasionally when symptoms flareup she will take an additional tablet.  She is otherwise well.    She is a       ROS:   10 point review systems is otherwise negative except as per the HPI    PE:  /62 (BP Location: Left arm, Patient Position: Sitting, BP Cuff Size: Adult)   Pulse 84   Resp 16   Ht 1.702 m (5' 7\")   Wt 81.6 kg (180 lb)   SpO2 98%   BMI 28.19 kg/m²   Gen: no acute distress  HEENT: Symmetric face. Anicteric sclerae. Moist mucus membranes  NECK: No JVD. No lymphadenopathy  CARDIAC: Regular, Normal S1, S2, No murmur  VASCULATURE: carotids are normal bilaterally without bruit  RESP: Clear to auscultation bilaterally  ABD: Soft, non-tender, non-distended  EXT: No edema, no clubbing or cyanosis  SKIN: Warm and dry  NEURO: No gross deficits  PSYCH: Appropriate affect, participates in conversation    The " ASCVD Risk score (Vicente LEROY, et al., 2019) failed to calculate.    Past Medical History:   Diagnosis Date    Generalized headaches     Other ovarian cyst, right side     Pelvic pain in female 10/02/2017    2/10    Plantar fasciitis     Tendonitis      No Known Allergies  Outpatient Encounter Medications as of 2/3/2023   Medication Sig Dispense Refill    metoprolol SR (TOPROL XL) 50 MG TABLET SR 24 HR Take 1 Tablet by mouth every day. 100 Tablet 3    Levonorgestrel (MIRENA, 52 MG, IU) by Intrauterine route.      tizanidine (ZANAFLEX) 4 MG Tab       gabapentin (NEURONTIN) 100 MG Cap TAKE 1 CAPSULE BY MOUTH THREE TIMES DAILY FOR 3 DAYS, THEN 2 CAPS THREE TIMES DAILY FOR 3 DAYS, THEN 3 CAPS THREE TIMES DAILY THEREAFTER      Levocetirizine Dihydrochloride 5 MG Tab Take 1 Tablet by mouth 2 times a day as needed.      [DISCONTINUED] metoprolol tartrate (LOPRESSOR) 25 MG Tab Take 1 Tablet by mouth 2 times a day. **LAST SEEN 3/2022 .  TO ENSURE FURTHER REFILLS, CALL [phone] TO SCHEDULE FOLLOW UP VISIT.** 200 Tablet 3    [DISCONTINUED] metoprolol tartrate (LOPRESSOR) 25 MG Tab Take 1 Tablet by mouth 2 times a day. **LAST SEEN 3/2022 .  TO ENSURE FURTHER REFILLS, CALL [phone] TO SCHEDULE FOLLOW UP VISIT.** 180 Tablet 0    [DISCONTINUED] pseudoephedrine (SUDAFED) 30 MG Tab Take 60 mg by mouth every four hours as needed for Congestion. (Patient not taking: Reported on 2/3/2023)       No facility-administered encounter medications on file as of 2/3/2023.     Social History     Socioeconomic History    Marital status: Single     Spouse name: Not on file    Number of children: Not on file    Years of education: Not on file    Highest education level: Not on file   Occupational History    Not on file   Tobacco Use    Smoking status: Never    Smokeless tobacco: Never   Vaping Use    Vaping Use: Never used   Substance and Sexual Activity    Alcohol use: Not Currently    Drug use: No    Sexual activity: Not on file   Other  Topics Concern    Not on file   Social History Narrative    ** Merged History Encounter **          Social Determinants of Health     Financial Resource Strain: Not on file   Food Insecurity: Not on file   Transportation Needs: Not on file   Physical Activity: Not on file   Stress: Not on file   Social Connections: Not on file   Intimate Partner Violence: Not on file   Housing Stability: Not on file       Studies  No results found for: CHOLSTRLTOT, LDL, HDL, TRIGLYCERIDE    Lab Results   Component Value Date/Time    SODIUM 141 06/02/2022 10:10 AM    POTASSIUM 4.3 06/02/2022 10:10 AM    CHLORIDE 107 06/02/2022 10:10 AM    CO2 25 06/02/2022 10:10 AM    GLUCOSE 69 06/02/2022 10:10 AM    BUN 10 06/02/2022 10:10 AM    CREATININE 0.83 06/02/2022 10:10 AM    CREATININE 0.9 03/13/2009 01:55 AM     Lab Results   Component Value Date/Time    ALKPHOSPHAT 109 (H) 06/02/2022 10:10 AM    ASTSGOT 20 06/02/2022 10:10 AM    ALTSGPT 15 06/02/2022 10:10 AM    TBILIRUBIN 0.3 06/02/2022 10:10 AM

## 2023-03-30 ENCOUNTER — TELEPHONE (OUTPATIENT)
Dept: CARDIOLOGY | Facility: MEDICAL CENTER | Age: 38
End: 2023-03-30
Payer: COMMERCIAL

## 2023-03-30 DIAGNOSIS — I49.3 PVC (PREMATURE VENTRICULAR CONTRACTION): ICD-10-CM

## 2023-03-30 NOTE — TELEPHONE ENCOUNTER
Returned patient's call and clarified medication prescriptions. All questions/concerns answered at this time, patient appreciative of information given.

## 2023-03-30 NOTE — TELEPHONE ENCOUNTER
Returned Mckenzie's phone call, [phone]. Patient recently started taking the longer acting metoprolol instead of the metoprolol BID she was on prior to her last OV. She states her heart rate jumps to the 120s with any exertion, including walking. With the elevated heart rate she starts to feel more short of breath. Once she rests for 10-15 minutes her heart rate returns to 70s-80s and shortness of breath subsides. ER precautions provided.     States that she felt the prior prescription worked better, but that she still had to adjust the medication to her heart rate quite a bit and would be open to trying something else for better control if recommended.     To HK: ADD 3/30 (BE patient but whole care team out of office) - Please review and advise on patient's heart rate and Metoprolol. Thank you!!   no abrasions, no jaundice, no lesions, no pruritis, and no rashes.

## 2023-03-30 NOTE — TELEPHONE ENCOUNTER
Armando Fam M.D.  You 10 minutes ago (12:47 PM)     Okay to go back to her prior metoprolol BID dosing.      Patient called and updated. Medication list updated.

## 2023-03-30 NOTE — TELEPHONE ENCOUNTER
BE    Caller: Mckenzie Cash     Topic/issue: Pt has questions regarding her Metoprolol. She is not sure it is working.   Resting HR: 72  Standing HR: 120  She is also having SOB when moving.  She is not sure if she should take any of her medication to help her.     If you are not able to reach her by phone please leave her a detailed message in my chart and her voicemail     Callback Number: [phone] (home)      Thank you,  Giana GARDNER

## 2023-03-30 NOTE — TELEPHONE ENCOUNTER
BE    Caller: Mckenzie Cash    Topic/issue: PT says she forgot to ask if she should continue taking old RX in addition to the new RX's. Please call back to advise     Callback Number: [phone]    Thank You  Sallie SHERMAN

## 2023-03-31 DIAGNOSIS — I49.3 PVC (PREMATURE VENTRICULAR CONTRACTION): ICD-10-CM

## 2023-04-01 NOTE — TELEPHONE ENCOUNTER
Wilner Dela Cruz M.D.  You 17 minutes ago (5:07 PM)     Ok to provide revert to her metoprolol Rx.     Thx   BE      Noted BE response. Medication list updated yesterday.

## 2023-08-24 ENCOUNTER — OFFICE VISIT (OUTPATIENT)
Dept: URGENT CARE | Facility: CLINIC | Age: 38
End: 2023-08-24
Payer: COMMERCIAL

## 2023-08-24 VITALS
DIASTOLIC BLOOD PRESSURE: 66 MMHG | BODY MASS INDEX: 26.53 KG/M2 | SYSTOLIC BLOOD PRESSURE: 122 MMHG | WEIGHT: 169 LBS | HEIGHT: 67 IN | OXYGEN SATURATION: 97 % | RESPIRATION RATE: 16 BRPM | TEMPERATURE: 97 F | HEART RATE: 80 BPM

## 2023-08-24 DIAGNOSIS — J02.9 SORETHROAT: ICD-10-CM

## 2023-08-24 LAB — S PYO DNA SPEC NAA+PROBE: NOT DETECTED

## 2023-08-24 PROCEDURE — 3078F DIAST BP <80 MM HG: CPT

## 2023-08-24 PROCEDURE — 99213 OFFICE O/P EST LOW 20 MIN: CPT

## 2023-08-24 PROCEDURE — 87651 STREP A DNA AMP PROBE: CPT

## 2023-08-24 PROCEDURE — 3074F SYST BP LT 130 MM HG: CPT

## 2023-08-24 RX ORDER — METOPROLOL SUCCINATE 50 MG/1
1 TABLET, EXTENDED RELEASE ORAL DAILY
COMMUNITY
End: 2023-09-05

## 2023-08-24 RX ORDER — DEXAMETHASONE SODIUM PHOSPHATE 10 MG/ML
10 INJECTION INTRAMUSCULAR; INTRAVENOUS ONCE
Status: COMPLETED | OUTPATIENT
Start: 2023-08-24 | End: 2023-08-24

## 2023-08-24 RX ORDER — MELOXICAM 15 MG/1
TABLET ORAL
COMMUNITY
End: 2023-08-24

## 2023-08-24 RX ORDER — TIZANIDINE 4 MG/1
1 TABLET ORAL EVERY 8 HOURS PRN
COMMUNITY
End: 2023-08-24

## 2023-08-24 RX ADMIN — DEXAMETHASONE SODIUM PHOSPHATE 10 MG: 10 INJECTION INTRAMUSCULAR; INTRAVENOUS at 14:58

## 2023-08-24 ASSESSMENT — FIBROSIS 4 INDEX: FIB4 SCORE: 0.83

## 2023-08-24 NOTE — PROGRESS NOTES
Chief Complaint   Patient presents with    Pharyngitis     Sunday, light headed, no fever, otc ibuprofen,        HISTORY OF PRESENT ILLNESS: Patient is a pleasant 38 y.o. female who presents to urgent care today ongoing sore throat, no noted fevers, taking ibuprofen with little to no relief.  This started on Sunday.  Patient states she feels slightly lightheaded at times.  She denies any chest pain, no major shortness of breath.  No history related otherwise.    Patient Active Problem List    Diagnosis Date Noted    Complex cyst of right ovary 10/04/2017    Not immune to rubella 07/16/2012       Allergies:Patient has no known allergies.    Current Outpatient Medications Ordered in Epic   Medication Sig Dispense Refill    metoprolol SR (TOPROL XL) 50 MG TABLET SR 24 HR Take 1 Tablet by mouth every day.      metoprolol tartrate (LOPRESSOR) 25 MG Tab Take 1 Tablet by mouth 2 times a day. 180 Tablet 3    metoprolol tartrate (LOPRESSOR) 25 MG Tab Take 1 tablet by mouth twice daily 180 Tablet 1    Levonorgestrel (MIRENA, 52 MG, IU) by Intrauterine route.      tizanidine (ZANAFLEX) 4 MG Tab        No current Epic-ordered facility-administered medications on file.       Past Medical History:   Diagnosis Date    Generalized headaches     Other ovarian cyst, right side     Pelvic pain in female 10/02/2017    2/10    Plantar fasciitis     Tendonitis        Social History     Tobacco Use    Smoking status: Never    Smokeless tobacco: Never   Vaping Use    Vaping Use: Never used   Substance Use Topics    Alcohol use: Not Currently    Drug use: No       No family status information on file.     Family History   Adopted: Yes       ROS:  Review of Systems   Constitutional: Negative for fever, chills, weight loss, malaise, and fatigue.   HENT: Negative for ear pain, nosebleeds, congestion, positive sore throat and negative neck pain.    Eyes: Negative for vision changes.   Neuro: Negative for headache, sensory changes, weakness,  "seizure, LOC.   Cardiovascular: Negative for chest pain, palpitations, orthopnea and leg swelling.   Respiratory: Negative for cough, sputum production, shortness of breath and wheezing.   Gastrointestinal: Negative for abdominal pain, nausea, vomiting or diarrhea.   Genitourinary: Negative for dysuria, urgency and frequency.  Musculoskeletal: Negative for falls, neck pain, back pain, joint pain, myalgias.   Skin: Negative for rash, diaphoresis.     Exam:  /66 (BP Location: Right arm, Patient Position: Sitting, BP Cuff Size: Adult)   Pulse 80   Temp 36.1 °C (97 °F) (Temporal)   Resp 16   Ht 1.702 m (5' 7\")   Wt 76.7 kg (169 lb)   SpO2 97%   General: well-nourished, well-developed female in NAD  Head: normocephalic, atraumatic  Eyes: PERRLA, no conjunctival injection, acuity grossly intact, lids normal.  Ears: normal shape and symmetry, no tenderness, no discharge. External canals are without any significant edema or erythema. Tympanic membranes are without any inflammation, no effusion. Gross auditory acuity is intact.  Nose: symmetrical without tenderness, no discharge.  Mouth/Throat: reasonable hygiene, positive erythema, negative exudates and negative tonsillar enlargement.  Neck: no masses, range of motion within normal limits, no tracheal deviation. No obvious thyroid enlargement.   Lymph: no cervical adenopathy. No supraclavicular adenopathy.   Neuro: alert and oriented. Cranial nerves 1-12 grossly intact. No sensory deficit.   Cardiovascular: regular rate and rhythm. No edema.  Pulmonary: no distress. Chest is symmetrical with respiration, no wheezes, crackles, or rhonchi.   Abdomen: soft, non-tender, no guarding, no hepatosplenomegaly.  Musculoskeletal: no clubbing, appropriate muscle tone, gait is stable.  Skin: warm, dry, intact, no clubbing, no cyanosis, no rashes.   Psych: appropriate mood, affect, judgement.       Assessment/Plan:  1. Sorethroat  POCT Cepheid Group A Strep - PCR    " dexamethasone (Decadron) injection (check route below) 10 mg      Patient is a pleasant 38 y.o. female who presents to urgent care today ongoing sore throat, no noted fevers, taking ibuprofen with little to no relief.  This started on Sunday.  Patient states she feels slightly lightheaded at times.  She denies any chest pain, no major shortness of breath.  No history related otherwise.  On physical exam she has noted erythremia, no exudate, no major tonsillar enlargement.  POCT strep complete 12 potential causes, patient given Decadron here in the office today for comfort measures.  Strep was negative for anything significant at this time.  Advised patient to drink plenty of fluids, take Motrin and Tylenol as needed, vitamin C, D and zinc.  Patient is aware of the plan of care and agreeable at this time, encouraged them to follow-up if they continue to get worse or do not improve. Total time spent with the patient 14 minutes to include, review of chart, charting, assessment, procedure.       Supportive care, differential diagnoses, and indications for immediate follow-up discussed with patient.   Pathogenesis of diagnosis discussed including typical length and natural progression.   Instructed to return to clinic or nearest emergency department for any change in condition, further concerns, or worsening of symptoms.  Patient states understanding of the plan of care and discharge instructions.  Instructed to make an appointment, for follow up, with primary care provider.      Please note that this dictation was created using voice recognition software. I have made every reasonable attempt to correct obvious errors, but I expect that there are errors of grammar and possibly content that I did not discover before finalizing the note.      Luisa AVALOS

## 2023-08-31 ENCOUNTER — PATIENT MESSAGE (OUTPATIENT)
Dept: CARDIOLOGY | Facility: MEDICAL CENTER | Age: 38
End: 2023-08-31
Payer: COMMERCIAL

## 2023-08-31 ENCOUNTER — TELEPHONE (OUTPATIENT)
Dept: CARDIOLOGY | Facility: MEDICAL CENTER | Age: 38
End: 2023-08-31
Payer: COMMERCIAL

## 2023-08-31 NOTE — TELEPHONE ENCOUNTER
----- Message from Woodrow Fry Ass't sent at 2023  7:46 AM PDT -----  Regarding: FW: Medication Metoprolol    ----- Message -----  From: Kia Kenny  Sent: 2023   7:34 AM PDT  To: Price Flower  Subject: Medication Metoprolol                            PATIENT NAME:Mckenzie Cash  CALLER NAME:SAME   REASON FOR CALL:REFILL MEDICATION METOPROLOL  PHONE NUMBER:  CARDIO PROVIDER: MAXIMILIAN  : [id number]  MRN: [id number]  ADVISED 1-2 BUSINESS DAYS FOR CALL BACK Y

## 2023-08-31 NOTE — PATIENT COMMUNICATION
"To BE: Patient requested refill of Metoprolol and clarified that she \"doesn't do well with the extended release\" and has been taking Lopressor 50mg in the AM and 25mg in the PM. Noted that Toprol XL was most recently ordered at your last OV. Please advise if okay to refill requested medication or other recommendations. Thank you!  "

## 2023-09-05 ENCOUNTER — TELEPHONE (OUTPATIENT)
Dept: CARDIOLOGY | Facility: MEDICAL CENTER | Age: 38
End: 2023-09-05
Payer: COMMERCIAL

## 2023-09-05 DIAGNOSIS — I49.3 PVC (PREMATURE VENTRICULAR CONTRACTION): ICD-10-CM

## 2023-09-05 NOTE — TELEPHONE ENCOUNTER
BE    *Please reference [employer]oc[employer] message from 08-*    Caller: Mckenzie Cash    Topic/issue: Patient was calling regards to her metoprolol tartrate (LOPRESSOR) 25 MG Tab medication. She has not heard anything back yet and needs the medication since she is out today. Please make sure it is not the extended release version.    Callback Number: [phone]    Thank you,  -Dimple COSTA

## 2023-09-06 ENCOUNTER — PATIENT MESSAGE (OUTPATIENT)
Dept: CARDIOLOGY | Facility: MEDICAL CENTER | Age: 38
End: 2023-09-06
Payer: COMMERCIAL

## 2023-09-06 NOTE — PATIENT COMMUNICATION
Wilner Dela Cruz M.D.  You 6 days ago     Can fill the metoprolol tartrate     Thx   BE      Medication was sent to pharmacy 9/5/23.

## 2023-11-05 ENCOUNTER — HOSPITAL ENCOUNTER (EMERGENCY)
Facility: MEDICAL CENTER | Age: 38
End: 2023-11-05
Attending: EMERGENCY MEDICINE

## 2023-11-05 VITALS
OXYGEN SATURATION: 100 % | HEIGHT: 67 IN | WEIGHT: 171.74 LBS | TEMPERATURE: 97 F | BODY MASS INDEX: 26.96 KG/M2 | RESPIRATION RATE: 17 BRPM | DIASTOLIC BLOOD PRESSURE: 88 MMHG | SYSTOLIC BLOOD PRESSURE: 145 MMHG | HEART RATE: 86 BPM

## 2023-11-05 DIAGNOSIS — L03.818 CELLULITIS OF OTHER SPECIFIED SITE: ICD-10-CM

## 2023-11-05 DIAGNOSIS — N94.89 LABIAL PAIN: ICD-10-CM

## 2023-11-05 PROCEDURE — 99284 EMERGENCY DEPT VISIT MOD MDM: CPT

## 2023-11-05 PROCEDURE — 700102 HCHG RX REV CODE 250 W/ 637 OVERRIDE(OP): Performed by: EMERGENCY MEDICINE

## 2023-11-05 PROCEDURE — A9270 NON-COVERED ITEM OR SERVICE: HCPCS | Performed by: EMERGENCY MEDICINE

## 2023-11-05 RX ORDER — SULFAMETHOXAZOLE AND TRIMETHOPRIM 800; 160 MG/1; MG/1
1 TABLET ORAL ONCE
Status: COMPLETED | OUTPATIENT
Start: 2023-11-05 | End: 2023-11-05

## 2023-11-05 RX ORDER — OXYCODONE HYDROCHLORIDE 5 MG/1
5 TABLET ORAL EVERY 4 HOURS PRN
Qty: 15 TABLET | Refills: 0 | Status: SHIPPED | OUTPATIENT
Start: 2023-11-05 | End: 2023-11-10

## 2023-11-05 RX ORDER — CEPHALEXIN 250 MG/1
500 CAPSULE ORAL ONCE
Status: COMPLETED | OUTPATIENT
Start: 2023-11-05 | End: 2023-11-05

## 2023-11-05 RX ORDER — CEPHALEXIN 500 MG/1
500 CAPSULE ORAL 4 TIMES DAILY
Qty: 28 CAPSULE | Refills: 0 | Status: ACTIVE | OUTPATIENT
Start: 2023-11-05 | End: 2023-11-12

## 2023-11-05 RX ORDER — SULFAMETHOXAZOLE AND TRIMETHOPRIM 800; 160 MG/1; MG/1
1 TABLET ORAL 2 TIMES DAILY
Qty: 14 TABLET | Refills: 0 | Status: ACTIVE | OUTPATIENT
Start: 2023-11-05 | End: 2023-11-12

## 2023-11-05 RX ADMIN — CEPHALEXIN 500 MG: 250 CAPSULE ORAL at 08:57

## 2023-11-05 RX ADMIN — SULFAMETHOXAZOLE AND TRIMETHOPRIM 1 TABLET: 800; 160 TABLET ORAL at 08:57

## 2023-11-05 ASSESSMENT — PAIN DESCRIPTION - DESCRIPTORS: DESCRIPTORS: TENDER

## 2023-11-05 ASSESSMENT — FIBROSIS 4 INDEX: FIB4 SCORE: 0.83

## 2023-11-05 NOTE — ED PROVIDER NOTES
"  ER Provider Note    Scribed for Joe Arechiga M.D. by Wilner Mckeon. 11/5/2023   8:44 AM    Primary Care Provider: Rogerio Monaco M.D.    CHIEF COMPLAINT  Chief Complaint   Patient presents with    Vaginal Pain     EXTERNAL RECORDS REVIEWED  Outpatient Notes Seen at urgent care Aug 2023 for sore throat    HPI/ROS    LIMITATION TO HISTORY   Select: : None    Mckenzie Cash is a 38 y.o. female who presents to the ED for evaluation of vaginal pain onset 2 days ago. She states that she thinks she has a cyst found on her labia. The cyst has been increasing in size and is painful. She does note possible subjective fever that was present on Friday.     PAST MEDICAL HISTORY  Past Medical History:   Diagnosis Date    Generalized headaches     Other ovarian cyst, right side     Pelvic pain in female 10/02/2017    2/10    Plantar fasciitis     Tendonitis      SURGICAL HISTORY  Past Surgical History:   Procedure Laterality Date    PELVISCOPY N/A 10/4/2017    Procedure: PELVISCOPY;  Surgeon: Eliza Carrizales M.D.;  Location: SURGERY [facility];  Service: Gynecology    CARPAL TUNNEL RELEASE Right 01/2017    LAPAROSCOPY  7/22/2011    Performed by ROSS VELAZQUEZ at SURGERY [facility]       FAMILY HISTORY  Family History   Adopted: Yes       SOCIAL HISTORY   reports that she has never smoked. She has never used smokeless tobacco. She reports that she does not currently use alcohol. She reports that she does not use drugs.    CURRENT MEDICATIONS  Previous Medications    LEVONORGESTREL (MIRENA, 52 MG, IU)    by Intrauterine route.    METOPROLOL TARTRATE (LOPRESSOR) 25 MG TAB    Take 2 Tablets by mouth every morning AND 1 Tablet every evening.    TIZANIDINE (ZANAFLEX) 4 MG TAB           ALLERGIES  No Known Allergies     PHYSICAL EXAM  BP (!) 145/88   Pulse 86   Temp 36.1 °C (97 °F) (Temporal)   Resp 17   Ht 1.702 m (5' 7\")   Wt 77.9 kg (171 lb 11.8 oz)   SpO2 100%   BMI 26.90 kg/m²  "   Nursing note and vitals reviewed.  Constitutional: Well-developed and well-nourished. No distress.   HENT: Head is normocephalic and atraumatic. Oropharynx is clear and moist without exudate or erythema.   Eyes: Pupils are equal, round, and reactive to light. Conjunctiva are normal.   Cardiovascular: Normal rate and regular rhythm. No murmur heard. Normal radial pulses.  Pulmonary/Chest: Breath sounds normal. No wheezes or rales.   Abdominal: Soft and non-tender. No distention    Musculoskeletal: Extremities exhibit normal range of motion without edema or tenderness.   Neurological: Awake, alert and oriented to person, place, and time. No focal deficits noted.  : Tenderness and induration of the left labia, no drainable fluid collection  Skin: Skin is warm and dry. No rash.   Psychiatric: Normal mood and affect. Appropriate for clinical situation    INITIAL ASSESSMENT AND PLAN    8:44 AM - Patient was evaluated at bedside. She presents for a left labial cyst. She has labial cellulitis, concern for abscess so will treat with Bactrim and Keflex. The patient will be medicated with Bactim 800-160 mg and Keflex 500 mg for her symptoms. Patient will now be discharged at this time. Discussed return precautions and plan for at home care. Patient verbalizes understanding and agreement to this plan of care.       ED Observation Status? No; Patient does not meet criteria for ED Observation.     DISPOSITION AND DISCUSSIONS    Discussion of management with other [facility] or appropriate source(s): None     Escalation of care considered, and ultimately not performed: I&D procedure, no drainable abscess    Barriers to care at this time, including but not limited to: None    Decision tools and prescription drugs considered including, but not limited to: Antibiotics sent to pharmacy .    DISPOSITION:  Patient will be discharged home in stable condition.    FOLLOW UP:  Rogerio Monaco M.D.  [address]  [address]  [place]  [id number]  [phone]    Schedule an appointment as soon as possible for a visit       [facility], Emergency Dept  [address]  [place] [id number] [phone]    If symptoms worsen    OUTPATIENT MEDICATIONS:  New Prescriptions    CEPHALEXIN (KEFLEX) 500 MG CAP    Take 1 Capsule by mouth 4 times a day for 7 days.    SULFAMETHOXAZOLE-TRIMETHOPRIM (BACTRIM DS) 800-160 MG TABLET    Take 1 Tablet by mouth 2 times a day for 7 days.     FINAL DIAGNOSIS  1. Labial pain    2. Cellulitis of other specified site         IWilner (Carlitos), am scribing for, and in the presence of, Joe Arechiga M.D..    Electronically signed by: Wilner Mckeon (Carlitos), 11/5/2023    IJoe M.D. personally performed the services described in this documentation, as scribed by Wilner Mckeon in my presence, and it is both accurate and complete.      The note accurately reflects work and decisions made by me.  Joe Arechiga M.D.  11/5/2023  1:01 PM

## 2023-11-05 NOTE — ED NOTES
Reviewed discharge instructions and prescriptions x 2 sent to selected Pharmacy w/ pt, verbalized understanding to wound care, follow up care, return precautions and medications, denied further questions/concerns.  Pt ambulated from ED.

## 2023-11-05 NOTE — ED TRIAGE NOTES
"Chief Complaint   Patient presents with    Vaginal Pain      Complains of a painful \" cyst\" on left labia, noticed it last Friday. Tender and swollen, per pt.  Denies fevers.   "

## 2023-11-07 ENCOUNTER — HOSPITAL ENCOUNTER (EMERGENCY)
Facility: MEDICAL CENTER | Age: 38
End: 2023-11-07
Attending: EMERGENCY MEDICINE
Payer: COMMERCIAL

## 2023-11-07 VITALS
HEART RATE: 64 BPM | BODY MASS INDEX: 27.23 KG/M2 | WEIGHT: 173.5 LBS | DIASTOLIC BLOOD PRESSURE: 74 MMHG | SYSTOLIC BLOOD PRESSURE: 118 MMHG | RESPIRATION RATE: 16 BRPM | TEMPERATURE: 97.5 F | HEIGHT: 67 IN | OXYGEN SATURATION: 100 %

## 2023-11-07 DIAGNOSIS — R11.0 NAUSEA: ICD-10-CM

## 2023-11-07 DIAGNOSIS — N76.4 LEFT GENITAL LABIAL ABSCESS: ICD-10-CM

## 2023-11-07 DIAGNOSIS — N76.2 CELLULITIS OF LABIA MAJORA: ICD-10-CM

## 2023-11-07 DIAGNOSIS — Z98.890 STATUS POST INCISION AND DRAINAGE: ICD-10-CM

## 2023-11-07 PROCEDURE — 304217 HCHG IRRIGATION SYSTEM

## 2023-11-07 PROCEDURE — 303977 HCHG I & D

## 2023-11-07 PROCEDURE — 99284 EMERGENCY DEPT VISIT MOD MDM: CPT

## 2023-11-07 PROCEDURE — 96375 TX/PRO/DX INJ NEW DRUG ADDON: CPT

## 2023-11-07 PROCEDURE — 700111 HCHG RX REV CODE 636 W/ 250 OVERRIDE (IP): Mod: JZ | Performed by: EMERGENCY MEDICINE

## 2023-11-07 PROCEDURE — 700105 HCHG RX REV CODE 258: Performed by: EMERGENCY MEDICINE

## 2023-11-07 PROCEDURE — 700101 HCHG RX REV CODE 250: Performed by: EMERGENCY MEDICINE

## 2023-11-07 PROCEDURE — 96374 THER/PROPH/DIAG INJ IV PUSH: CPT

## 2023-11-07 RX ORDER — IBUPROFEN 200 MG
800 TABLET ORAL EVERY 6 HOURS PRN
COMMUNITY

## 2023-11-07 RX ORDER — CEFAZOLIN 2 G/1
2 INJECTION, POWDER, FOR SOLUTION INTRAMUSCULAR; INTRAVENOUS ONCE
Status: COMPLETED | OUTPATIENT
Start: 2023-11-07 | End: 2023-11-07

## 2023-11-07 RX ORDER — SODIUM CHLORIDE, SODIUM LACTATE, POTASSIUM CHLORIDE, CALCIUM CHLORIDE 600; 310; 30; 20 MG/100ML; MG/100ML; MG/100ML; MG/100ML
1000 INJECTION, SOLUTION INTRAVENOUS ONCE
Status: COMPLETED | OUTPATIENT
Start: 2023-11-07 | End: 2023-11-07

## 2023-11-07 RX ORDER — ONDANSETRON 4 MG/1
4 TABLET, ORALLY DISINTEGRATING ORAL EVERY 8 HOURS PRN
Qty: 10 TABLET | Refills: 0 | Status: SHIPPED | OUTPATIENT
Start: 2023-11-07

## 2023-11-07 RX ORDER — MORPHINE SULFATE 15 MG/1
7.5-15 TABLET ORAL EVERY 8 HOURS PRN
Qty: 6 TABLET | Refills: 0 | Status: SHIPPED | OUTPATIENT
Start: 2023-11-07 | End: 2023-11-09

## 2023-11-07 RX ORDER — ACETAMINOPHEN 500 MG
1000 TABLET ORAL EVERY 6 HOURS PRN
COMMUNITY

## 2023-11-07 RX ORDER — KETOROLAC TROMETHAMINE 30 MG/ML
30 INJECTION, SOLUTION INTRAMUSCULAR; INTRAVENOUS ONCE
Status: COMPLETED | OUTPATIENT
Start: 2023-11-07 | End: 2023-11-07

## 2023-11-07 RX ADMIN — KETOROLAC TROMETHAMINE 30 MG: 30 INJECTION, SOLUTION INTRAMUSCULAR; INTRAVENOUS at 11:36

## 2023-11-07 RX ADMIN — SODIUM CHLORIDE, POTASSIUM CHLORIDE, SODIUM LACTATE AND CALCIUM CHLORIDE 1000 ML: 600; 310; 30; 20 INJECTION, SOLUTION INTRAVENOUS at 10:07

## 2023-11-07 RX ADMIN — FENTANYL CITRATE 50 MCG: 50 INJECTION, SOLUTION INTRAMUSCULAR; INTRAVENOUS at 11:00

## 2023-11-07 RX ADMIN — CEFAZOLIN 2 G: 2 INJECTION, POWDER, FOR SOLUTION INTRAMUSCULAR; INTRAVENOUS at 10:07

## 2023-11-07 RX ADMIN — LIDOCAINE HYDROCHLORIDE 5 ML: 10 INJECTION, SOLUTION INFILTRATION; PERINEURAL at 10:15

## 2023-11-07 ASSESSMENT — FIBROSIS 4 INDEX: FIB4 SCORE: 0.83

## 2023-11-07 NOTE — Clinical Note
Mckenzie Cash was seen and treated in our emergency department on 11/7/2023.  She may return to work on 11/09/2023.       If you have any questions or concerns, please don't hesitate to call.      Nicol Cuellar D.O.

## 2023-11-07 NOTE — ED NOTES
Medication history reviewed with pt. Med rec is complete.  Allergies reviewed, per pt    Patient has had outpatient antibiotics in the last 30 days.  Pt started KEFLEX 500MG and BACTRIM 800-160MG on 11/5/2023 for 7 day course, last dose for both antibiotics were on 11/7/2023 @ 0730    Pt reports that she is taking her METOPROLOL TARTRATE 25MG, 2 tablets (50MG) in AM and one tablet (25MG) in the evening.    Pt is not on any anticoagulants

## 2023-11-07 NOTE — ED TRIAGE NOTES
"Chief Complaint   Patient presents with    Cyst     C/o pain and swelling, pt reports cyst in vaginal area - recently seen in this ER for same concern     /89   Pulse 78   Temp 36.4 °C (97.5 °F) (Temporal)   Resp 16   Ht 1.702 m (5' 7\")   Wt 78.7 kg (173 lb 8 oz)   SpO2 97%   BMI 27.17 kg/m²       "

## 2023-11-07 NOTE — ED PROVIDER NOTES
"ED Provider Note    CHIEF COMPLAINT  Chief Complaint   Patient presents with    Cyst     C/o pain and swelling, pt reports cyst in vaginal area - recently seen in this ER for same concern       EXTERNAL RECORDS REVIEWED  Patient was seen in the emergency department 2 days ago for the same complaint.  At that time, it did not appear, incision and drainage was appropriate.  She was started on antibiotics.    Prior to that patient was seen in the family medicine clinic August of this year for pharyngitis.    HPI/ROS  LIMITATION TO HISTORY   Select: : None  OUTSIDE HISTORIAN(S):  None    Mckenzie Cash is a 38 y.o. female who presents to the emergency department with a chief complaint of labial pain.  She was seen here 2 days ago, diagnosed with labial cellulitis and placed on antibiotics.  She feels the symptoms have worsened.  Is very difficult for her to sit.  She is taking the antibiotics as appropriate.  No fever or systemic symptoms.    PAST MEDICAL HISTORY   has a past medical history of Generalized headaches, Other ovarian cyst, right side, Pelvic pain in female (10/02/2017), Plantar fasciitis, and Tendonitis.  History of \"inappropriate tachycardia\" and PVCs.    SURGICAL HISTORY   has a past surgical history that includes laparoscopy (7/22/2011); carpal tunnel release (Right, 01/2017); and pelviscopy (N/A, 10/4/2017).    FAMILY HISTORY  Family History   Adopted: Yes       SOCIAL HISTORY  Social History     Tobacco Use    Smoking status: Never    Smokeless tobacco: Never   Vaping Use    Vaping Use: Never used   Substance and Sexual Activity    Alcohol use: Not Currently    Drug use: No    Sexual activity: Not on file       CURRENT MEDICATIONS  Home Medications       Reviewed by Natalie Ocampo (Pharmacy Tech) on 11/07/23 at 1040  Med List Status: Complete     Medication Last Dose Status   acetaminophen (TYLENOL) 500 MG Tab 11/6/2023 Active   B Complex Vitamins (B COMPLEX PO) 11/3/2023 Active " "  cephALEXin (KEFLEX) 500 MG Cap 11/7/2023 Active   ibuprofen (MOTRIN) 200 MG Tab 11/7/2023 Active   Levonorgestrel (MIRENA, 52 MG, IU) > 2 years Active   metoprolol tartrate (LOPRESSOR) 25 MG Tab 11/7/2023 Active   oxyCODONE immediate-release (ROXICODONE) 5 MG Tab 11/6/2023 Active   sulfamethoxazole-trimethoprim (BACTRIM DS) 800-160 MG tablet 11/7/2023 Active   tizanidine (ZANAFLEX) 4 MG Tab > 2 days Active                    ALLERGIES  No Known Allergies    PHYSICAL EXAM  VITAL SIGNS: /75   Pulse 63   Temp 36.4 °C (97.5 °F) (Temporal)   Resp 16   Ht 1.702 m (5' 7\")   Wt 78.7 kg (173 lb 8 oz)   SpO2 100%   BMI 27.17 kg/m²    Vitals reviewed.  Constitutional: Patient is oriented to person, place, and time. Appears well-developed and well-nourished. Mild-moderate distress.    Head: Normocephalic and atraumatic.   Mouth/Throat: Oropharynx is clear and moist  Eyes: Conjunctivae are normal.   Neck: Normal range of motion.  Cardiovascular: Normal rate, regular rhythm and normal heart sounds.   Pulmonary/Chest: Effort normal and breath sounds normal. No respiratory distress  : left labia majora is diffusely erythematous, swollen, indurated.  At the posterior aspect, there is an area of drainage.  Small amount of fluctuance.  No cyst or abnormalities at the introitus.  Musculoskeletal: No edema  Neurological: No focal deficits.   Skin: Skin is warm and dry. No erythema. No pallor.   Psychiatric: Patient has a normal mood and affect.     DIAGNOSTIC STUDIES / PROCEDURES    Incision and Drainage Procedure    Indication: Abscess    Location: left labial majora    Procedure: The patient was positioned appropriately and the skin over the incision site was prepped with betadine. Local anesthesia was obtained by infiltration using 1% Lidocaine without epinephrine.  An incision was then made over the apex of the lesion and 3-4cc of purulent material was expressed. Loculations were broken up using forceps and more of " the material was able to be expressed. The drainage cavity was then irrigated. The patient’s tetanus status was up to date and did not require a booster dose.    The patient tolerated the procedure well.    Complications: None    RADIOLOGY    Bedside ultrasound shows diffuse cobblestoning, small areas of fluid collection in the area.    COURSE & MEDICAL DECISION MAKING    ED Observation Status? No; Patient does not meet criteria for ED Observation.     INITIAL ASSESSMENT, COURSE AND PLAN  Care Narrative:   This is a pleasant 38-year-old female.  She has reassuring vital signs.  She is afebrile, she is not tachycardic or hypoxic.  She has been taking ibuprofen but does not believe that she has had a fever.  She has been on antibiotics for 2 days and is not improving.  At this time, I do think is appropriate for incision and drainage based on ultrasound findings, drainage from the wound and her worsening symptoms.  She voices verbal agreement.    11:30 AM patient tolerated incision and drainage well.  As depicted on ultrasound, there was a fairly large cavity, at least 2-3 centimeters that was drained, irrigated.  Patient advised on continued management.  I anticipate she will likely feel better in the next 2 days.  She will finish her antibiotics she was prescribed 7 days worth.  She is given an additional short course of pain medication as well as a work note.  Tetanus is up-to-date.  She is well-appearing and nontoxic.  She is discharged home in stable condition.  She is given return precautions.    12:00 PM prior to discharge, patient expressed that her nausea medications make her nauseated.  She declined need for treatment at this time but requested therapy at home.  She is prescribed Zofran.    HYDRATION: Based on the patient's presentation of Dehydration the patient was given IV fluids. IV Hydration was used because oral hydration was not adequate alone. Upon recheck following hydration, the patient was  improved.        DISPOSITION AND DISCUSSIONS  I have discussed management of the patient with the following physicians and RAYMOND's:  None    Discussion of management with other [facility] or appropriate source(s): None     Escalation of care considered, and ultimately not performed:IV fluids, blood analysis, and diagnostic imaging    Barriers to care at this time, including but not limited to: Patient does not have insurance.     Decision tools and prescription drugs considered including, but not limited to: Antibiotics already previously prescribed .    FINAL DIAGNOSIS  1. Left genital labial abscess    2. Status post incision and drainage    3. Cellulitis of labia majora           Electronically signed by: Nicol Cuellar D.O., 11/7/2023 9:39 AM

## 2023-11-07 NOTE — DISCHARGE INSTRUCTIONS
If symptoms are not improving in the next 2 days, please return for reevaluation.  Finish your antibiotics.  You can continue warm bath soaks, application of warm moist compress to the area encouraging any drainage.

## 2023-11-11 NOTE — ED NOTES
Pt concerned that her abscess is not fully healed after her course of ABX. Advised  to pt to finish her ABX as prescribed and come to ER or seek medical treatment if experiencing new or worsening symptoms. Pt requesting an extension to her ABX course, pt educated that our ER physician would have to evaluate her before ABX are changed or extended. Pt Verbalized understanding.

## 2023-11-12 ENCOUNTER — HOSPITAL ENCOUNTER (EMERGENCY)
Facility: MEDICAL CENTER | Age: 38
End: 2023-11-12
Attending: EMERGENCY MEDICINE
Payer: COMMERCIAL

## 2023-11-12 VITALS
OXYGEN SATURATION: 97 % | SYSTOLIC BLOOD PRESSURE: 127 MMHG | HEART RATE: 82 BPM | BODY MASS INDEX: 27.13 KG/M2 | DIASTOLIC BLOOD PRESSURE: 82 MMHG | TEMPERATURE: 97.8 F | RESPIRATION RATE: 20 BRPM | WEIGHT: 172.84 LBS | HEIGHT: 67 IN

## 2023-11-12 DIAGNOSIS — Z51.89 ENCOUNTER FOR WOUND RE-CHECK: ICD-10-CM

## 2023-11-12 PROCEDURE — 99282 EMERGENCY DEPT VISIT SF MDM: CPT

## 2023-11-12 ASSESSMENT — FIBROSIS 4 INDEX: FIB4 SCORE: 0.83

## 2023-11-12 NOTE — DISCHARGE INSTRUCTIONS
Your skin seems to have healed very well.  Continue soaks and soap and water washing 2 or 3 times a day, if you can, until fully healed.  Ibuprofen can help with the little bit of swelling you still have.  You do not need additional antibiotics based on today's exam.

## 2023-11-12 NOTE — ED PROVIDER NOTES
"ER Provider Note    Scribed for Jayce Bocanegra M.d. by Agapito Heredia. 11/12/2023  1:06 PM    Primary Care Provider: Rogerio Monaco M.D.    CHIEF COMPLAINT  Chief Complaint   Patient presents with    Wound Check     Pt. Reports abscess to L labia, states she just took \"the last antibiotic and it's better but I don't want it to get horrible again\". Denies fevers/chills.      EXTERNAL RECORDS REVIEWED  Outpatient Notes Patient was first seen for this on the 5th of November, and incision and drainage was not necessary at that time. She was started on antibiotics at that time. She was evaluated again on the 7th, and had an incision and drainage procedure performed.      HPI/ROS  LIMITATION TO HISTORY   Select: : None    OUTSIDE HISTORIAN(S):  Nursing staff was present at patient's prior visit, and aided in providing history of her initial encounter.     Mckenzie Cash is a 38 y.o. female who presents to the ED for evaluation of a healing abscess to her left labia onset ten days ago ago. Patient reports that she has been seen for this twice earlier this month, and was prescribed antibiotics (Keflex and Bactrim) on the 5th. She notes that after that visit, her abscess worsened significantly, prompting her to return again on the 7th. At her most recent visit on the 7th, she had an incision and drainage procedure performed. She now reports that the abscess is not completely gone yet, and she took her last antibiotic this morning. She notes that the abscess is in a similar condition to before her antibiotic use, and it possibly has worsened slightly. Patient denies any history of similar symptoms. She presents today out of concern of the abscess worsening.     PAST MEDICAL HISTORY  Past Medical History:   Diagnosis Date    Generalized headaches     Other ovarian cyst, right side     Pelvic pain in female 10/02/2017    2/10    Plantar fasciitis     Tendonitis        SURGICAL HISTORY  Past Surgical History: "   Procedure Laterality Date    PELVISCOPY N/A 10/4/2017    Procedure: PELVISCOPY;  Surgeon: Eliza Carrizales M.D.;  Location: SURGERY [facility];  Service: Gynecology    CARPAL TUNNEL RELEASE Right 01/2017    LAPAROSCOPY  7/22/2011    Performed by ROSS VELAZQUEZ at SURGERY [facility]       FAMILY HISTORY  Family History   Adopted: Yes       SOCIAL HISTORY   reports that she has never smoked. She has never used smokeless tobacco. She reports that she does not currently use alcohol. She reports that she does not use drugs.    CURRENT MEDICATIONS  Discharge Medication List as of 11/12/2023  1:27 PM        CONTINUE these medications which have NOT CHANGED    Details   acetaminophen (TYLENOL) 500 MG Tab Take 1,000 mg by mouth every 6 hours as needed. Indications: Pain, Historical Med      ibuprofen (MOTRIN) 200 MG Tab Take 800 mg by mouth every 6 hours as needed. Indications: Pain, Historical Med      B Complex Vitamins (B COMPLEX PO) Take 1 Tablet by mouth see administration instructions. Pt takes sporidially, Historical Med      ondansetron (ZOFRAN ODT) 4 MG TABLET DISPERSIBLE Take 1 Tablet by mouth every 8 hours as needed for Nausea/Vomiting for up to 10 doses., Disp-10 Tablet, R-0, Normal      sulfamethoxazole-trimethoprim (BACTRIM DS) 800-160 MG tablet Take 1 Tablet by mouth 2 times a day for 7 days., Disp-14 Tablet, R-0, Normal      cephALEXin (KEFLEX) 500 MG Cap Take 1 Capsule by mouth 4 times a day for 7 days., Disp-28 Capsule, R-0, Normal      metoprolol tartrate (LOPRESSOR) 25 MG Tab Take 2 Tablets by mouth every morning AND 1 Tablet every evening., Disp-270 Tablet, R-3, Normal      Levonorgestrel (MIRENA, 52 MG, IU) 1 Each by Intrauterine route see administration instructions. Every 5 years, Historical Med      tizanidine (ZANAFLEX) 4 MG Tab Take 4 mg by mouth every 6 hours as needed. Indications: Musculoskeletal Pain, Historical Med             ALLERGIES  Patient has no known  "allergies.    PHYSICAL EXAM  VITAL SIGNS: /82   Pulse 82   Temp 36.6 °C (97.8 °F) (Temporal)   Resp 20   Ht 1.702 m (5' 7\")   Wt 78.4 kg (172 lb 13.5 oz)   SpO2 97%   BMI 27.07 kg/m²   Pulse ox interpretation: I interpret this pulse ox as normal.  Constitutional: Alert in no apparent distress.  HENT: No signs of trauma, Bilateral external ears normal, Nose normal.   Eyes: Pupils are equal and reactive, Conjunctiva normal, Non-icteric.    Cardiovascular: Normal peripheral perfusion  Thorax & Lungs: Unlabored respirations, equal chest expansion, no accessory muscle use  Abdomen: Non-distended  : At the lower most part of the left labia majora, there is a small scab consistent with I & D procedure from 11/7. No significant redness, swelling, fluctuants, or any other sign of infection. Very small area of residual induration directly under the scab.   Skin:  No erythema, No rash.   Extremities: No gross deformity  Musculoskeletal: Good range of motion in all major joints.   Neurologic: Alert, Normal motor function, No focal deficits noted.   Psychiatric: Affect normal, Judgment normal, Mood normal.    COURSE & MEDICAL DECISION MAKING     ED Observation Status? No; Patient does not meet criteria for ED Observation.     INITIAL ASSESSMENT, COURSE AND PLAN  Care Narrative:       1:06 PM Patient presents to the ED for evaluation of a healing abscess to her labia majora. Patient has been seen for this twice this month, and took her last dose of antibiotics this morning. She had an I&D procedure performed on the 7th of this month, and her wound seems to be healing appropriately without any signs of infection. No lab work necessary today, as the area is in good condition and I believe the patient is able to go home at this time with proper wound care. Patient evaluated at bedside and discussed plan of care, and I informed the patient that it is very important that she keep the area clean. I informed her that " treating with ibuprofen may help with any pain and swelling to the area. I discussed plan for discharge and follow up as outlined below. The patient is stable for discharge at this time and will return for any new or worsening symptoms. Patient verbalizes understanding and support with my plan for discharge.           DISPOSITION AND DISCUSSIONS  I have discussed management of the patient with the following physicians and RAYMOND's:  None.    Discussion of management with other [facility] [place] or appropriate source(s): None     Escalation of care considered, and ultimately not performed: blood analysis and diagnostic imaging.  Bedside evidence of excellent healing, however.  No sign of systemic illness.    Barriers to care at this time, including but not limited to:  None are known at this time .     Decision tools and prescription drugs considered including, but not limited to: Antibiotics considered, but patient seems to have healed appropriately. .     The patient will return for new or worsening symptoms and is stable at the time of discharge.    DISPOSITION:  Patient will be discharged home in stable condition.    FOLLOW UP:  Rogerio Monaco M.D.  [address] [facility] [id number]  [id number]  [facility] [id number]  [phone]    Schedule an appointment as soon as possible for a visit       FINAL DIAGNOSIS  1. Encounter for wound re-check            Agapito MAYEN (Carlitos), am scribing for, and in the presence of, Jayce Bocanegra M.D..    Electronically signed by: Agapito Heredia (Carlitos), 11/12/2023    Jayce MAYEN M.D. personally performed the services described in this documentation, as scribed by Agapito Heredia in my presence, and it is both accurate and complete.

## 2023-11-12 NOTE — ED TRIAGE NOTES
"Chief Complaint   Patient presents with    Wound Check     Pt. Reports abscess to L labia, states she just took \"the last antibiotic and it's better but I don't want it to get horrible again\". Denies fevers/chills.        "

## 2023-11-12 NOTE — ED NOTES
Exam performed by ERP w/ female, RN at bedside.  Privacy and support provided throughout exam.  No bleeding or drainage noted.

## 2024-10-01 DIAGNOSIS — I49.3 PVC (PREMATURE VENTRICULAR CONTRACTION): ICD-10-CM

## 2024-10-02 RX ORDER — METOPROLOL TARTRATE 25 MG/1
TABLET, FILM COATED ORAL
Qty: 270 TABLET | Refills: 0 | Status: SHIPPED | OUTPATIENT
Start: 2024-10-02

## 2024-10-09 ENCOUNTER — HOSPITAL ENCOUNTER (OUTPATIENT)
Facility: MEDICAL CENTER | Age: 39
End: 2024-10-09
Attending: NURSE PRACTITIONER
Payer: COMMERCIAL

## 2024-10-09 LAB — B-HCG SERPL-ACNC: <1 MIU/ML (ref 0–5)

## 2024-10-09 PROCEDURE — 84702 CHORIONIC GONADOTROPIN TEST: CPT

## 2024-11-03 NOTE — PROGRESS NOTES
Medical Decision Making:  Today's Assessment / Status / Plan:   -Still requiring metoprolol for PVCs and will believe is inappropriate sinus tachycardia.  -She tolerates the metoprolol tartrate.  Did not feel better on succinate.  Other alternatives would be atenolol, propranolol.  Or good switch to calcium channel blockers if the metoprolol becomes ineffective but I doubt that would happen.  -Try magnesium to help with PVCs.  -Continues to be physically active without other issues at this time  -Getting better slowly over time, with medical therapy, feels 95% back to her baseline  -RTC 2 years, she will let me know if there are any changes    Assessment:   1. Palpitations    2. Inappropriate sinus tachycardia (HCC)    3. PVC (premature ventricular contraction)  - metoprolol tartrate (LOPRESSOR) 25 MG Tab; Take 1 Tablet by mouth see administration instructions. TAKE 2 TABLETS BY MOUTH ONCE DAILY IN THE MORNING AND 1 TABLET  IN THE EVENING and extra as needed for palpitations  Dispense: 480 Tablet; Refill: 8    4. COVID-19 long hamichael manifesting chronic palpitations    5. Chest pressure    Chief Complaint:   Chief Complaint   Patient presents with    Other     F/V Dx Complex cyst of right ovary      Mckenzie Cash is a 39 y.o. female who returns for palpitations, PVCS, inappropriate sinus tachycardia after COVID-19 infection, covid long hauler, chest pressure with palpitations.    Patient had Covid January 2020.  Originally treated for strep which happens a few times per year.  Prior was 70% back to her baseline, now 95%.  Very slow to get better.  Noted shortness of breath at rest, chest pain and palpitations.  Sometimes lightheaded, sometimes heart is racing 130-140 bpm.  Chest tightness can happen at rest or activity.  Gets very fatigued also.  Reviewed emergency room record 3/13/2021, admitted for shortness of breath and palpitations.  Had a normal CT PE study 2/11/2021.  Also in the ED  1/20/2021.  HS trop normal.  Echo normal.    Zio patch heart monitor showing times that her heart rate was in the 130s just walking around her home with symptoms.  Also some PVCs which she can feel at rest.    We started metoprolol and overall she is doing better.   Still feeling PVCs in left chest, distracting, worse with stress.    If she missed metoprolol and she can get very out of breath.  Symptoms and the heart rate is elevated, 120s to 150s or if she misses her metoprolol, she can get chest pressure.    Did get vaccinated.  Tested positive at home again, same sx but more mild.    Not limited by chest pain, pressure or tightness with activity.   No significant lightheadedness, or presyncope/syncope.   No symptoms of leg claudication.   No stroke/TIA like symptoms.    No prior hypertension.  No prior hyperlipidemia.  No prior smoking history.  No history of diabetes.  No history of autoimmune disease such as lupus or rheumatoid arthritis.  No chronic kidney disease.  No ETOH overuse.  No caffeine overuse. 0-3.  No recreation substance use.  No hx asthma.    Adopted, FH not know.    Lives in [place].  Licensed , echos, CT, ECG on dogs.  Born at [facility].  Has horses, dogs, etc, very busy at home.  Has 2 kids.  Point of contact is her mother, Sandra Dumont    DATA REVIEWED by me:  ECG (my personal interpretation) 3/13/2021  Sinus, 82, normal    [facility] 5[facility]27[facility]21  1. Sinus rhythm with appropriate heart rate range. Average 85, range 48 to 164 bpm.   2. Normal sinus node and AV node conduction normal. No pauses.   3. Rare PACs.   4. Rare PVCs.   5. No sustained rhythm changes. No atrial fibrillation/flutter.   6. 11 patient triggers during sinus, rate  bpm, once with a PVC, symptoms reported include short of breath while walking in the house,  bpm, also skipped/irregular beats/fluttering/racing.     Conclusion: Sinus rhythm. Symptoms during normal rhythm, sinus tachycardia and once with a  "PVC, consider inappropriate sinus tachycardia.     Echo 5-5-21  Normal echo.  Left ventricular ejection fraction is visually estimated to be 65%.     No prior study is available for comparison.     CTA 2/11/2021  Normal    Most recent labs:       Lab Results   Component Value Date/Time    WBC 5.0 06/02/2022 10:10 AM    HEMOGLOBIN 13.9 06/02/2022 10:10 AM    HEMATOCRIT 41.5 06/02/2022 10:10 AM    MCV 88.3 06/02/2022 10:10 AM    INR 1.15 (H) 03/06/2009 08:45 PM      Lab Results   Component Value Date/Time    SODIUM 141 06/02/2022 10:10 AM    POTASSIUM 4.3 06/02/2022 10:10 AM    CHLORIDE 107 06/02/2022 10:10 AM    CO2 25 06/02/2022 10:10 AM    GLUCOSE 69 06/02/2022 10:10 AM    BUN 10 06/02/2022 10:10 AM    CREATININE 0.83 06/02/2022 10:10 AM    CREATININE 0.9 03/13/2009 01:55 AM      Lab Results   Component Value Date/Time    ASTSGOT 20 06/02/2022 10:10 AM    ALTSGPT 15 06/02/2022 10:10 AM    ALBUMIN 4.2 06/02/2022 10:10 AM      No results found for: \"CHOLSTRLTOT\", \"LDL\", \"HDL\", \"TRIGLYCERIDE\"  No results for input(s): \"NTPROBNP\", \"TROPONINT\" in the last 72 hours.      Past Medical History:   Diagnosis Date    Generalized headaches     Other ovarian cyst, right side     Pelvic pain in female 10/02/2017    2/10    Plantar fasciitis     Tendonitis      Past Surgical History:   Procedure Laterality Date    PELVISCOPY N/A 10/4/2017    Procedure: PELVISCOPY;  Surgeon: Eliza Carrizales M.D.;  Location: SURGERY [facility];  Service: Gynecology    CARPAL TUNNEL RELEASE Right 01/2017    LAPAROSCOPY  7/22/2011    Performed by ROSS VELAZQUEZ at [facility]     Family History   Adopted: Yes     Social History     Socioeconomic History    Marital status: Single     Spouse name: Not on file    Number of children: Not on file    Years of education: Not on file    Highest education level: Not on file   Occupational History    Not on file   Tobacco Use    Smoking status: Never    Smokeless tobacco: Never " "  Vaping Use    Vaping status: Never Used   Substance and Sexual Activity    Alcohol use: Not Currently    Drug use: No    Sexual activity: Not on file   Other Topics Concern    Not on file   Social History Narrative    ** Merged History Encounter **          Social Drivers of Health     Financial Resource Strain: Not on file   Food Insecurity: Not on file   Transportation Needs: Not on file   Physical Activity: Not on file   Stress: Not on file   Social Connections: Not on file   Intimate Partner Violence: Not on file   Housing Stability: Not on file     No Active Allergies    Current Outpatient Medications   Medication Sig Dispense Refill    metoprolol tartrate (LOPRESSOR) 25 MG Tab Take 1 Tablet by mouth see administration instructions. TAKE 2 TABLETS BY MOUTH ONCE DAILY IN THE MORNING AND 1 TABLET  IN THE EVENING and extra as needed for palpitations 480 Tablet 8    ibuprofen (MOTRIN) 200 MG Tab Take 800 mg by mouth every 6 hours as needed. Indications: Pain      B Complex Vitamins (B COMPLEX PO) Take 1 Tablet by mouth see administration instructions. Pt takes sporidially      Levonorgestrel (MIRENA, 52 MG, IU) 1 Each by Intrauterine route see administration instructions. Every 5 years      tizanidine (ZANAFLEX) 4 MG Tab Take 4 mg by mouth every 6 hours as needed. Indications: Musculoskeletal Pain       No current facility-administered medications for this visit.     ROS  All others systems reviewed and negative.     Objective:     /80 (BP Location: Left arm, Patient Position: Sitting, BP Cuff Size: Adult)   Pulse 68   Resp 16   Ht 1.702 m (5' 7\")   Wt 82.1 kg (181 lb)   SpO2 (!) 68%  Body mass index is 28.35 kg/m².    General: No acute distress. Well nourished.  HEENT: EOM grossly intact, no scleral icterus, no pharyngeal erythema.   Neck:  No JVD, no bruits, trachea midline  CVS: RRR. Normal S1, S2. No M/R/G. No LE edema.  2+ radial pulses, 2+ PT pulses  Resp: CTAB. No wheezing or crackles/rhonchi. " Normal respiratory effort.  Abdomen: Soft, NT, no nolan hepatomegaly.  MSK/Ext: No clubbing or cyanosis.  Skin: Warm and dry, no rashes.  Neurological: CN III-XII grossly intact. No focal deficits.   Psych: A&O x 3, appropriate affect, good judgement    Physical Exam listed below was completed in entrety today and unchanged from 3-7-22 except where noted.  Some elements were copied from my note of that same day, which have been updated where appropriate and all reflect current meedical decision making from today.  I have confirmed and edited as necessary, the PFSH and ROS obtained by others.    Written instructions given today:      *Some people's PVCs improve with magnesium  -For magnesium replacement, I recommend any over the counter brand, generally a dose between 200-500 mg once daily.  Some people need to take it twice daily.  Some forms of magnesium can cause significant diarrhea/GI upset.  -Avoid magnesium citrate.  Watch out for the bottle saying that the magnesium is for bone and muscle health, it could still be magnesium citrate which is often used as a bowel prep.  -I typically recommend magnesium oxide or magnesium glycinate.      It is my pleasure to participate in the care of Ms. Cash.  Please do not hesitate to contact me with questions or concerns.    Elizabeth Jackson MD, [facility]  Cardiologist [facility]    Please note that this dictation was created using voice recognition software. I have made every reasonable attempt to correct obvious errors, but it is possible there are errors of grammar and possibly content that I did not discover before finalizing the note.

## 2024-11-04 ENCOUNTER — OFFICE VISIT (OUTPATIENT)
Dept: CARDIOLOGY | Facility: MEDICAL CENTER | Age: 39
End: 2024-11-04
Attending: INTERNAL MEDICINE
Payer: COMMERCIAL

## 2024-11-04 VITALS
OXYGEN SATURATION: 68 % | BODY MASS INDEX: 28.41 KG/M2 | HEIGHT: 67 IN | RESPIRATION RATE: 16 BRPM | WEIGHT: 181 LBS | HEART RATE: 68 BPM | SYSTOLIC BLOOD PRESSURE: 118 MMHG | DIASTOLIC BLOOD PRESSURE: 80 MMHG

## 2024-11-04 DIAGNOSIS — I47.11 INAPPROPRIATE SINUS TACHYCARDIA (HCC): ICD-10-CM

## 2024-11-04 DIAGNOSIS — R00.2 PALPITATIONS: ICD-10-CM

## 2024-11-04 DIAGNOSIS — R07.89 CHEST PRESSURE: ICD-10-CM

## 2024-11-04 DIAGNOSIS — U09.9 COVID-19 LONG HAULER MANIFESTING CHRONIC PALPITATIONS: ICD-10-CM

## 2024-11-04 DIAGNOSIS — R00.2 COVID-19 LONG HAULER MANIFESTING CHRONIC PALPITATIONS: ICD-10-CM

## 2024-11-04 DIAGNOSIS — I49.3 PVC (PREMATURE VENTRICULAR CONTRACTION): ICD-10-CM

## 2024-11-04 PROCEDURE — 99214 OFFICE O/P EST MOD 30 MIN: CPT | Performed by: INTERNAL MEDICINE

## 2024-11-04 PROCEDURE — 99211 OFF/OP EST MAY X REQ PHY/QHP: CPT | Performed by: INTERNAL MEDICINE

## 2024-11-04 PROCEDURE — 3074F SYST BP LT 130 MM HG: CPT | Performed by: INTERNAL MEDICINE

## 2024-11-04 PROCEDURE — 3079F DIAST BP 80-89 MM HG: CPT | Performed by: INTERNAL MEDICINE

## 2024-11-04 RX ORDER — METOPROLOL TARTRATE 25 MG/1
25 TABLET, FILM COATED ORAL SEE ADMIN INSTRUCTIONS
Qty: 480 TABLET | Refills: 8 | Status: SHIPPED | OUTPATIENT
Start: 2024-11-04

## 2024-11-04 NOTE — LETTER
[facility] - Operated by [facility]   [address], [address]  [place] [address]  Phone: [phone]  Fax: [phone]              Mckenzie Cash  1985    Encounter Date: 11/4/2024    Elizabeth Jackson M.D.          PROGRESS NOTE:        Medical Decision Making:  Today's Assessment / Status / Plan:   -Still requiring metoprolol for PVCs and will believe is inappropriate sinus tachycardia.  -She tolerates the metoprolol tartrate.  Did not feel better on succinate.  Other alternatives would be atenolol, propranolol.  Or good switch to calcium channel blockers if the metoprolol becomes ineffective but I doubt that would happen.  -Try magnesium to help with PVCs.  -Continues to be physically active without other issues at this time  -Getting better slowly over time, with medical therapy, feels 95% back to her baseline  -RTC 2 years, she will let me know if there are any changes    Assessment:   1. Palpitations    2. Inappropriate sinus tachycardia (HCC)    3. PVC (premature ventricular contraction)  - metoprolol tartrate (LOPRESSOR) 25 MG Tab; Take 1 Tablet by mouth see administration instructions. TAKE 2 TABLETS BY MOUTH ONCE DAILY IN THE MORNING AND 1 TABLET  IN THE EVENING and extra as needed for palpitations  Dispense: 480 Tablet; Refill: 8    4. COVID-19 long hauler manifesting chronic palpitations    5. Chest pressure    Chief Complaint:   Chief Complaint   Patient presents with   • Other     F/V Dx Complex cyst of right ovary      Mckenzie Cash is a 39 y.o. female who returns for palpitations, PVCS, inappropriate sinus tachycardia after COVID-19 infection, covid long hauler, chest pressure with palpitations.    Patient had Covid January 2020.  Originally treated for strep which happens a few times per year.  Prior was 70% back to her baseline, now 95%.  Very slow to get better.  Noted shortness of breath at rest,  chest pain and palpitations.  Sometimes lightheaded, sometimes heart is racing 130-140 bpm.  Chest tightness can happen at rest or activity.  Gets very fatigued also.  Reviewed emergency room record 3/13/2021, admitted for shortness of breath and palpitations.  Had a normal CT PE study 2/11/2021.  Also in the ED 1/20/2021.  HS trop normal.  Echo normal.    Zio patch heart monitor showing times that her heart rate was in the 130s just walking around her home with symptoms.  Also some PVCs which she can feel at rest.    We started metoprolol and overall she is doing better.   Still feeling PVCs in left chest, distracting, worse with stress.    If she missed metoprolol and she can get very out of breath.  Symptoms and the heart rate is elevated, 120s to 150s or if she misses her metoprolol, she can get chest pressure.    Did get vaccinated.  Tested positive at home again, same sx but more mild.    Not limited by chest pain, pressure or tightness with activity.   No significant lightheadedness, or presyncope/syncope.   No symptoms of leg claudication.   No stroke/TIA like symptoms.    No prior hypertension.  No prior hyperlipidemia.  No prior smoking history.  No history of diabetes.  No history of autoimmune disease such as lupus or rheumatoid arthritis.  No chronic kidney disease.  No ETOH overuse.  No caffeine overuse. 0-3.  No recreation substance use.  No hx asthma.    Adopted, FH not know.    Lives in [place].  Licensed , echos, CT, ECG on dogs.  Born at [facility].  Has horses, dogs, etc, very busy at home.  Has 2 kids.  Point of contact is her mother, Sandra Dumont    DATA REVIEWED by me:  ECG (my personal interpretation) 3/13/2021  Sinus, 82, normal    ZioPatch Summary: 5-27-21  1. Sinus rhythm with appropriate heart rate range. Average 85, range 48 to 164 bpm.   2. Normal sinus node and AV node conduction normal. No pauses.   3. Rare PACs.   4. Rare PVCs.   5. No sustained rhythm changes. No atrial  "fibrillation/flutter.   6. 11 patient triggers during sinus, rate  bpm, once with a PVC, symptoms reported include short of breath while walking in the house,  bpm, also skipped/irregular beats/fluttering/racing.     Conclusion: Sinus rhythm. Symptoms during normal rhythm, sinus tachycardia and once with a PVC, consider inappropriate sinus tachycardia.     Echo 5-5-21  Normal echo.  Left ventricular ejection fraction is visually estimated to be 65%.     No prior study is available for comparison.     CTA 2/11/2021  Normal    Most recent labs:       Lab Results   Component Value Date/Time    WBC 5.0 06/02/2022 10:10 AM    HEMOGLOBIN 13.9 06/02/2022 10:10 AM    HEMATOCRIT 41.5 06/02/2022 10:10 AM    MCV 88.3 06/02/2022 10:10 AM    INR 1.15 (H) 03/06/2009 08:45 PM      Lab Results   Component Value Date/Time    SODIUM 141 06/02/2022 10:10 AM    POTASSIUM 4.3 06/02/2022 10:10 AM    CHLORIDE 107 06/02/2022 10:10 AM    CO2 25 06/02/2022 10:10 AM    GLUCOSE 69 06/02/2022 10:10 AM    BUN 10 06/02/2022 10:10 AM    CREATININE 0.83 06/02/2022 10:10 AM    CREATININE 0.9 03/13/2009 01:55 AM      Lab Results   Component Value Date/Time    ASTSGOT 20 06/02/2022 10:10 AM    ALTSGPT 15 06/02/2022 10:10 AM    ALBUMIN 4.2 06/02/2022 10:10 AM      No results found for: \"CHOLSTRLTOT\", \"LDL\", \"HDL\", \"TRIGLYCERIDE\"  No results for input(s): \"NTPROBNP\", \"TROPONINT\" in the last 72 hours.      Past Medical History:   Diagnosis Date   • Generalized headaches    • Other ovarian cyst, right side    • Pelvic pain in female 10/02/2017    2/10   • Plantar fasciitis    • Tendonitis      Past Surgical History:   Procedure Laterality Date   • PELVISCOPY N/A 10/4/2017    Procedure: PELVISCOPY;  Surgeon: Eliza Carrizales M.D.;  Location: SURGERY [facility];  Service: Gynecology   • CARPAL TUNNEL RELEASE Right 01/2017   • LAPAROSCOPY  7/22/2011    Performed by ROSS VELAZQUEZ at SURGERY [facility]     Family History " "  Adopted: Yes     Social History     Socioeconomic History   • Marital status: Single     Spouse name: Not on file   • Number of children: Not on file   • Years of education: Not on file   • Highest education level: Not on file   Occupational History   • Not on file   Tobacco Use   • Smoking status: Never   • Smokeless tobacco: Never   Vaping Use   • Vaping status: Never Used   Substance and Sexual Activity   • Alcohol use: Not Currently   • Drug use: No   • Sexual activity: Not on file   Other Topics Concern   • Not on file   Social History Narrative    ** Merged History Encounter **          Social Drivers of Health     Financial Resource Strain: Not on file   Food Insecurity: Not on file   Transportation Needs: Not on file   Physical Activity: Not on file   Stress: Not on file   Social Connections: Not on file   Intimate Partner Violence: Not on file   Housing Stability: Not on file     No Active Allergies    Current Outpatient Medications   Medication Sig Dispense Refill   • metoprolol tartrate (LOPRESSOR) 25 MG Tab Take 1 Tablet by mouth see administration instructions. TAKE 2 TABLETS BY MOUTH ONCE DAILY IN THE MORNING AND 1 TABLET  IN THE EVENING and extra as needed for palpitations 480 Tablet 8   • ibuprofen (MOTRIN) 200 MG Tab Take 800 mg by mouth every 6 hours as needed. Indications: Pain     • B Complex Vitamins (B COMPLEX PO) Take 1 Tablet by mouth see administration instructions. Pt takes sporidially     • Levonorgestrel (MIRENA, 52 MG, IU) 1 Each by Intrauterine route see administration instructions. Every 5 years     • tizanidine (ZANAFLEX) 4 MG Tab Take 4 mg by mouth every 6 hours as needed. Indications: Musculoskeletal Pain       No current facility-administered medications for this visit.     ROS  All others systems reviewed and negative.     Objective:     /80 (BP Location: Left arm, Patient Position: Sitting, BP Cuff Size: Adult)   Pulse 68   Resp 16   Ht 1.702 m (5' 7\")   Wt 82.1 kg " (181 lb)   SpO2 (!) 68%  Body mass index is 28.35 kg/m².    General: No acute distress. Well nourished.  HEENT: EOM grossly intact, no scleral icterus, no pharyngeal erythema.   Neck:  No JVD, no bruits, trachea midline  CVS: RRR. Normal S1, S2. No M/R/G. No LE edema.  2+ radial pulses, 2+ PT pulses  Resp: CTAB. No wheezing or crackles/rhonchi. Normal respiratory effort.  Abdomen: Soft, NT, no nolan hepatomegaly.  MSK/Ext: No clubbing or cyanosis.  Skin: Warm and dry, no rashes.  Neurological: CN III-XII grossly intact. No focal deficits.   Psych: A&O x 3, appropriate affect, good judgement    Physical Exam listed below was completed in entrety today and unchanged from 3-7-22 except where noted.  Some elements were copied from my note of that same day, which have been updated where appropriate and all reflect current meedical decision making from today.  I have confirmed and edited as necessary, the PFSH and ROS obtained by others.    Written instructions given today:      *Some people's PVCs improve with magnesium  -For magnesium replacement, I recommend any over the counter brand, generally a dose between 200-500 mg once daily.  Some people need to take it twice daily.  Some forms of magnesium can cause significant diarrhea/GI upset.  -Avoid magnesium citrate.  Watch out for the bottle saying that the magnesium is for bone and muscle health, it could still be magnesium citrate which is often used as a bowel prep.  -I typically recommend magnesium oxide or magnesium glycinate.      It is my pleasure to participate in the care of Ms. Cash.  Please do not hesitate to contact me with questions or concerns.    Elizabeth Jackson MD, [facility]  Cardiologist [facility]    Please note that this dictation was created using voice recognition software. I have made every reasonable attempt to correct obvious errors, but it is possible there are errors of grammar and possibly content that I did  not discover before finalizing the note.      Rogerio Monaco M.D.  [id number]  [place] [address]  Via Fax: [phone]

## 2024-11-04 NOTE — PATIENT INSTRUCTIONS
*Some people's PVCs improve with magnesium  -For magnesium replacement, I recommend any over the counter brand, generally a dose between 200-500 mg once daily.  Some people need to take it twice daily.  Some forms of magnesium can cause significant diarrhea/GI upset.  -Avoid magnesium citrate.  Watch out for the bottle saying that the magnesium is for bone and muscle health, it could still be magnesium citrate which is often used as a bowel prep.  -I typically recommend magnesium oxide or magnesium glycinate.

## 2024-11-19 ENCOUNTER — OFFICE VISIT (OUTPATIENT)
Dept: URGENT CARE | Facility: CLINIC | Age: 39
End: 2024-11-19
Payer: COMMERCIAL

## 2024-11-19 VITALS
DIASTOLIC BLOOD PRESSURE: 92 MMHG | BODY MASS INDEX: 28.77 KG/M2 | SYSTOLIC BLOOD PRESSURE: 118 MMHG | HEIGHT: 66 IN | TEMPERATURE: 97.3 F | WEIGHT: 179 LBS | RESPIRATION RATE: 16 BRPM | HEART RATE: 71 BPM | OXYGEN SATURATION: 98 %

## 2024-11-19 DIAGNOSIS — J02.9 PHARYNGITIS, UNSPECIFIED ETIOLOGY: ICD-10-CM

## 2024-11-19 LAB — S PYO DNA SPEC NAA+PROBE: NOT DETECTED

## 2024-11-19 PROCEDURE — 87651 STREP A DNA AMP PROBE: CPT | Performed by: PHYSICIAN ASSISTANT

## 2024-11-19 PROCEDURE — 3074F SYST BP LT 130 MM HG: CPT | Performed by: PHYSICIAN ASSISTANT

## 2024-11-19 PROCEDURE — 99213 OFFICE O/P EST LOW 20 MIN: CPT | Performed by: PHYSICIAN ASSISTANT

## 2024-11-19 PROCEDURE — 3080F DIAST BP >= 90 MM HG: CPT | Performed by: PHYSICIAN ASSISTANT

## 2024-11-19 ASSESSMENT — ENCOUNTER SYMPTOMS
SORE THROAT: 1
COUGH: 0
ABDOMINAL PAIN: 0
VOMITING: 0
MYALGIAS: 1
FEVER: 0
NAUSEA: 0
DIARRHEA: 0

## 2024-11-19 NOTE — PROGRESS NOTES
Subjective     Mckenzie Cash is a 39 y.o. female who presents with Sore Throat (Both ears feel plugged x 2 days)    HPI:  Mckenzie Cash is a 39 y.o. female who presents today for evaluation of sore throat.  Sore throat started yesterday.  She has also had some ear fullness and general feeling of malaise.  She reports some mild runny nose and congestion but nothing significant.  She denies any cough or fever.  Has been taking ibuprofen which requires mild relief of symptoms.  She reports a history of recurrent strep infections.  Has only had it once since having her tonsils removed but symptoms feel similar and she wants to rule it out.        Review of Systems   Constitutional:  Positive for malaise/fatigue. Negative for fever.   HENT:  Positive for congestion, ear pain and sore throat.    Respiratory:  Negative for cough.    Gastrointestinal:  Negative for abdominal pain, diarrhea, nausea and vomiting.   Musculoskeletal:  Positive for myalgias.           PMH:  has a past medical history of Generalized headaches, Other ovarian cyst, right side, Pelvic pain in female (10/02/2017), Plantar fasciitis, and Tendonitis.  MEDS:   Current Outpatient Medications:     metoprolol tartrate (LOPRESSOR) 25 MG Tab, Take 1 Tablet by mouth see administration instructions. TAKE 2 TABLETS BY MOUTH ONCE DAILY IN THE MORNING AND 1 TABLET  IN THE EVENING and extra as needed for palpitations, Disp: 480 Tablet, Rfl: 8    ibuprofen (MOTRIN) 200 MG Tab, Take 800 mg by mouth every 6 hours as needed. Indications: Pain, Disp: , Rfl:     B Complex Vitamins (B COMPLEX PO), Take 1 Tablet by mouth see administration instructions. Pt takes sporidially, Disp: , Rfl:     Levonorgestrel (MIRENA, 52 MG, IU), 1 Each by Intrauterine route see administration instructions. Every 5 years, Disp: , Rfl:     tizanidine (ZANAFLEX) 4 MG Tab, Take 4 mg by mouth every 6 hours as needed. Indications: Musculoskeletal Pain, Disp: , Rfl:  "  ALLERGIES: No Known Allergies  SURGHX:   Past Surgical History:   Procedure Laterality Date    PELVISCOPY N/A 10/4/2017    Procedure: PELVISCOPY;  Surgeon: Eliza Carrizales M.D.;  Location: SURGERY [facility];  Service: Gynecology    CARPAL TUNNEL RELEASE Right 01/2017    LAPAROSCOPY  7/22/2011    Performed by ROSS VELAZQUEZ at SURGERY [facility]     SOCHX:  reports that she has never smoked. She has never used smokeless tobacco. She reports that she does not currently use alcohol. She reports that she does not use drugs.  FH: Family history was reviewed, no pertinent findings to report      Objective     BP (!) 118/92 (BP Location: Left arm, Patient Position: Sitting, BP Cuff Size: Adult)   Pulse 71   Temp 36.3 °C (97.3 °F) (Temporal)   Resp 16   Ht 1.676 m (5' 6\")   Wt 81.2 kg (179 lb)   SpO2 98%   BMI 28.89 kg/m²      Physical Exam  Constitutional:       Appearance: She is well-developed.   HENT:      Head: Normocephalic and atraumatic.      Right Ear: Tympanic membrane, ear canal and external ear normal.      Left Ear: Tympanic membrane, ear canal and external ear normal.      Nose: Mucosal edema and congestion present. No rhinorrhea.      Mouth/Throat:      Lips: Pink.      Mouth: Mucous membranes are moist.      Pharynx: Uvula midline. Posterior oropharyngeal erythema present.      Comments: Tonsils are surgically absent  Eyes:      Conjunctiva/sclera: Conjunctivae normal.      Pupils: Pupils are equal, round, and reactive to light.   Cardiovascular:      Rate and Rhythm: Normal rate and regular rhythm.      Heart sounds: Normal heart sounds. No murmur heard.  Pulmonary:      Effort: Pulmonary effort is normal.      Breath sounds: Normal breath sounds. No wheezing.   Musculoskeletal:      Cervical back: Normal range of motion.   Lymphadenopathy:      Cervical: No cervical adenopathy.   Skin:     General: Skin is warm and dry.      Capillary Refill: Capillary refill takes less than " 2 seconds.   Neurological:      Mental Status: She is alert and oriented to person, place, and time.   Psychiatric:         Behavior: Behavior normal.         Judgment: Judgment normal.       POCT GROUP A STREP, PCR - Negative      Assessment & Plan     1. Pharyngitis, unspecified etiology  - POCT GROUP A STREP, PCR  -PCR testing negative for strep.  Discussed results with patient.  Discussed that this is likely new onset viral URI.  No indication for antibiotics at this time. She can utilize over-the-counter medications and supportive care for symptoms.  She should also use OTC acetaminophen or ibuprofen if needed for fever.  Follow-up as needed.      Differential Diagnosis, natural history, and supportive care discussed. Return to the Urgent Care or follow up with your PCP if symptoms fail to resolve, or for any new or worsening symptoms. Emergency room precautions discussed. Patient and/or family appears understanding of information.

## 2024-12-06 ENCOUNTER — HOSPITAL ENCOUNTER (OUTPATIENT)
Dept: RADIOLOGY | Facility: MEDICAL CENTER | Age: 39
End: 2024-12-06
Attending: OBSTETRICS & GYNECOLOGY
Payer: COMMERCIAL

## 2024-12-06 DIAGNOSIS — N64.4 MASTODYNIA: ICD-10-CM

## 2024-12-06 PROCEDURE — 76642 ULTRASOUND BREAST LIMITED: CPT | Mod: RT

## 2024-12-06 PROCEDURE — G0279 TOMOSYNTHESIS, MAMMO: HCPCS

## 2024-12-13 ENCOUNTER — HOSPITAL ENCOUNTER (OUTPATIENT)
Dept: RADIOLOGY | Facility: MEDICAL CENTER | Age: 39
End: 2024-12-13
Attending: OBSTETRICS & GYNECOLOGY
Payer: COMMERCIAL

## 2024-12-13 DIAGNOSIS — R92.8 ABNORMAL FINDING ON BREAST IMAGING: ICD-10-CM

## 2024-12-13 LAB — PATHOLOGY CONSULT NOTE: NORMAL

## 2024-12-13 PROCEDURE — 19083 BX BREAST 1ST LESION US IMAG: CPT | Mod: LT

## 2024-12-13 PROCEDURE — 88305 TISSUE EXAM BY PATHOLOGIST: CPT

## 2024-12-13 PROCEDURE — 77065 DX MAMMO INCL CAD UNI: CPT

## 2024-12-16 ENCOUNTER — TELEPHONE (OUTPATIENT)
Dept: RADIOLOGY | Facility: MEDICAL CENTER | Age: 39
End: 2024-12-16
Payer: COMMERCIAL

## 2025-07-23 ENCOUNTER — OFFICE VISIT (OUTPATIENT)
Dept: URGENT CARE | Facility: PHYSICIAN GROUP | Age: 40
End: 2025-07-23
Payer: COMMERCIAL

## 2025-07-23 VITALS
TEMPERATURE: 99.8 F | SYSTOLIC BLOOD PRESSURE: 118 MMHG | BODY MASS INDEX: 28.82 KG/M2 | RESPIRATION RATE: 16 BRPM | DIASTOLIC BLOOD PRESSURE: 78 MMHG | HEART RATE: 116 BPM | WEIGHT: 184 LBS | OXYGEN SATURATION: 98 %

## 2025-07-23 DIAGNOSIS — R11.0 NAUSEA: ICD-10-CM

## 2025-07-23 DIAGNOSIS — R05.1 ACUTE COUGH: ICD-10-CM

## 2025-07-23 DIAGNOSIS — R53.83 OTHER FATIGUE: ICD-10-CM

## 2025-07-23 DIAGNOSIS — U07.1 COVID: ICD-10-CM

## 2025-07-23 DIAGNOSIS — J02.9 PHARYNGITIS, UNSPECIFIED ETIOLOGY: ICD-10-CM

## 2025-07-23 DIAGNOSIS — J06.9 VIRAL URI WITH COUGH: Primary | ICD-10-CM

## 2025-07-23 DIAGNOSIS — R50.9 FEVER, UNSPECIFIED FEVER CAUSE: ICD-10-CM

## 2025-07-23 LAB
FLUAV RNA SPEC QL NAA+PROBE: NEGATIVE
FLUBV RNA SPEC QL NAA+PROBE: NEGATIVE
RSV RNA SPEC QL NAA+PROBE: NEGATIVE
S PYO DNA SPEC NAA+PROBE: NOT DETECTED
SARS-COV-2 RNA RESP QL NAA+PROBE: POSITIVE

## 2025-07-23 PROCEDURE — 99214 OFFICE O/P EST MOD 30 MIN: CPT | Performed by: FAMILY MEDICINE

## 2025-07-23 PROCEDURE — 3078F DIAST BP <80 MM HG: CPT | Performed by: FAMILY MEDICINE

## 2025-07-23 PROCEDURE — 3074F SYST BP LT 130 MM HG: CPT | Performed by: FAMILY MEDICINE

## 2025-07-23 PROCEDURE — 87637 SARSCOV2&INF A&B&RSV AMP PRB: CPT | Performed by: FAMILY MEDICINE

## 2025-07-23 PROCEDURE — 87651 STREP A DNA AMP PROBE: CPT | Performed by: FAMILY MEDICINE

## 2025-07-23 RX ORDER — ONDANSETRON 4 MG/1
4 TABLET, ORALLY DISINTEGRATING ORAL ONCE
Status: COMPLETED | OUTPATIENT
Start: 2025-07-23 | End: 2025-07-23

## 2025-07-23 RX ORDER — BENZONATATE 100 MG/1
100 CAPSULE ORAL 3 TIMES DAILY PRN
Qty: 30 CAPSULE | Refills: 0 | Status: SHIPPED | OUTPATIENT
Start: 2025-07-23

## 2025-07-23 RX ORDER — ONDANSETRON 4 MG/1
4 TABLET, FILM COATED ORAL EVERY 4 HOURS PRN
Qty: 10 TABLET | Refills: 0 | Status: SHIPPED | OUTPATIENT
Start: 2025-07-23 | End: 2025-07-26

## 2025-07-23 RX ADMIN — ONDANSETRON 4 MG: 4 TABLET, ORALLY DISINTEGRATING ORAL at 17:33

## 2025-07-23 ASSESSMENT — ENCOUNTER SYMPTOMS
MYALGIAS: 1
VOMITING: 0
RHINORRHEA: 1
SHORTNESS OF BREATH: 0
COUGH: 1
NAUSEA: 1
EYE REDNESS: 0
SORE THROAT: 1
FEVER: 1
DIZZINESS: 0
CHILLS: 0
EYE DISCHARGE: 0

## 2025-07-23 NOTE — LETTER
July 23, 2025         Patient: Mckenzie Cash   YOB: 1985   Date of Visit: 7/23/2025           To Whom it May Concern:    Mckenzie Cash was seen in my clinic on 7/23/2025.  Please excuse the previous day of 7/22/2025.  Please excuse today and 7/24/2025 to 7/26/2025, may return to work on 7/27/2025.    If you have any questions or concerns, please don't hesitate to call.        Sincerely,           Foreign Giles M.D.  Electronically Signed

## 2025-07-23 NOTE — LETTER
July 23, 2025         Patient: Mckenzie Cash   YOB: 1985   Date of Visit: 7/23/2025           To Whom it May Concern:    Mckenzie Cash was seen in my clinic on 7/23/2025. She may return to work on 7/27/2025.    If you have any questions or concerns, please don't hesitate to call.        Sincerely,           Foreign Giles M.D.  Electronically Signed

## 2025-07-24 NOTE — PATIENT INSTRUCTIONS
Cough, Adult  A cough helps to clear your throat and lungs. A cough may be a sign of an illness or another medical condition.  An acute cough may only last 2-3 weeks, while a chronic cough may last 8 or more weeks.  Many things can cause a cough. They include:  Germs (viruses or bacteria) that attack the airway.  Breathing in things that bother (irritate) your lungs.  Allergies.  Asthma.  Mucus that runs down the back of your throat (postnasal drip).  Smoking.  Acid backing up from the stomach into the tube that moves food from the mouth to the stomach (gastroesophageal reflux).  Some medicines.  Lung problems.  Other medical conditions, such as heart failure or a blood clot in the lung (pulmonary embolism).  Follow these instructions at home:  Medicines  Take over-the-counter and prescription medicines only as told by your doctor.  Talk with your doctor before you take medicines that stop a cough (cough suppressants).  Lifestyle    Do not smoke, and try not to be around smoke. Do not use any products that contain nicotine or tobacco, such as cigarettes, e-cigarettes, and chewing tobacco. If you need help quitting, ask your doctor.  Drink enough fluid to keep your pee (urine) pale yellow.  Avoid caffeine.  Do not drink alcohol if your doctor tells you not to drink.  General instructions    Watch for any changes in your cough. Tell your doctor about them.  Always cover your mouth when you cough.  Stay away from things that make you cough, such as perfume, candles, campfire smoke, or cleaning products.  If the air is dry, use a cool mist vaporizer or humidifier in your home.  If your cough is worse at night, try using extra pillows to raise your head up higher while you sleep.  Rest as needed.  Keep all follow-up visits as told by your doctor. This is important.  Contact a doctor if:  You have new symptoms.  You cough up pus.  Your cough does not get better after 2-3 weeks, or your cough gets worse.  Cough medicine  does not help your cough and you are not sleeping well.  You have pain that gets worse or pain that is not helped with medicine.  You have a fever.  You are losing weight and you do not know why.  You have night sweats.  Get help right away if:  You cough up blood.  You have trouble breathing.  Your heartbeat is very fast.  These symptoms may be an emergency. Do not wait to see if the symptoms will go away. Get medical help right away. Call your local emergency services (911 in the [place] Do not drive yourself to the hospital.  Summary  A cough helps to clear your throat and lungs. Many things can cause a cough.  Take over-the-counter and prescription medicines only as told by your doctor.  Always cover your mouth when you cough.  Contact a doctor if you have new symptoms or you have a cough that does not get better or gets worse.  This information is not intended to replace advice given to you by your health care provider. Make sure you discuss any questions you have with your health care provider.  Document Revised: 02/05/2021 Document Reviewed: 01/06/2020  [employer] Patient Education © 2023 [employer]  Vomiting, Adult  Vomiting is when stomach contents forcefully come out of the mouth. Many people notice nausea before vomiting. Vomiting can make you feel weak and cause you to become dehydrated.  Dehydration can make you feel tired and thirsty, cause you to have a dry mouth, and decrease how often you urinate. Older adults and people who have other diseases or a weak body defense system (immune system) are at higher risk for dehydration. It is important to treat vomiting as told by your health care provider.  Follow these instructions at home:    Watch your symptoms for any changes. Tell your health care provider about them.  Eating and drinking         Follow these recommendations as told by your health care provider:  Take an oral rehydration solution (ORS). This is a drink that is sold at pharmacies and  retail stores.  Eat bland, easy-to-digest foods in small amounts as you are able. These foods include bananas, applesauce, rice, lean meats, toast, and crackers.  Drink clear fluids slowly and in small amounts as you are able. Clear fluids include water, ice chips, low-calorie sports drinks, and fruit juice that has water added (diluted fruit juice).  Avoid drinking fluids that contain a lot of sugar or caffeine, such as energy drinks, sports drinks, and soda.  Avoid alcohol.  Avoid spicy or fatty foods.    General instructions  Wash your hands often using soap and water for at least 20 seconds. If soap and water are not available, use hand .  Make sure that everyone in your household washes their hands frequently.  Take over-the-counter and prescription medicines only as told by your health care provider.  Rest at home while you recover.  Watch your condition for any changes.  Keep all follow-up visits. This is important.  Contact a health care provider if:  Your vomiting gets worse.  You have new symptoms.  You have a fever.  You cannot drink fluids without vomiting.  You feel light-headed or dizzy.  You have a headache.  You have muscle cramps.  You have a rash.  You have pain while urinating.  Get help right away if:  You have pain in your chest, neck, arm, or jaw.  Your heart is beating very quickly.  You have trouble breathing or you are breathing very quickly.  You feel extremely weak or you faint.  Your skin feels cold and clammy.  You feel confused.  You have persistent vomiting.  You have vomit that is bright red or looks like black coffee grounds.  You have stools (feces) that are bloody or black, or stools that look like tar.  You have a severe headache, a stiff neck, or both.  You have severe pain, cramping, or bloating in your abdomen.  You have signs of dehydration, such as:  Dark urine, very little urine, or no urine.  Cracked lips.  Dry mouth.  Sunken eyes.  Sleepiness.  Weakness.  These  symptoms may be an emergency. Get help right away. Call 911.  Do not wait to see if the symptoms will go away.  Do not drive yourself to the hospital.  Summary  Vomiting is when stomach contents forcefully come out of the mouth. Vomiting can cause you to become dehydrated.  It is important to treat vomiting as told by your health care provider. Follow your health care provider's instructions about eating and drinking.  Wash your hands often using soap and water for at least 20 seconds. If soap and water are not available, use hand .  Watch your condition for any changes and for signs of dehydration.  Keep all follow-up visits. This is important.  This information is not intended to replace advice given to you by your health care provider. Make sure you discuss any questions you have with your health care provider.  Document Revised: 06/24/2022 Document Reviewed: 06/24/2022  [employer] Patient Education © 2023 [employer]

## 2025-07-24 NOTE — PROGRESS NOTES
Subjective:   Mckenzie Cash is a 40 y.o. female who presents for Flu Like Symptoms (X3days, fever, body aches, joint pain, throat pain, light head, runny nose, extreme fatigue)        URI   This is a new (Reports relatively sudden onset fever, fatigue cough sore throat over the past 2 days) problem. The problem has been unchanged. Associated symptoms include congestion, coughing, nausea, rhinorrhea and a sore throat. Pertinent negatives include no chest pain, rash or vomiting. Associated symptoms comments: There has been community-wide COVID-19, influenza, and RSV exposure, the patient denies known direct COVID-19 or influenza exposure  . Treatments tried: Rest, fluids. The treatment provided mild relief.     PMH:  has a past medical history of Generalized headaches, Other ovarian cyst, right side, Pelvic pain in female (10/02/2017), Plantar fasciitis, and Tendonitis.  MEDS: Current Medications[1]  ALLERGIES: Allergies[2]  SURGHX: Past Surgical History[3]  SOCHX:  reports that she has never smoked. She has never used smokeless tobacco. She reports that she does not currently use alcohol. She reports that she does not use drugs.  FH:   Family History   Adopted: Yes     Review of Systems   Constitutional:  Positive for fever and malaise/fatigue. Negative for chills.   HENT:  Positive for congestion, rhinorrhea and sore throat.    Eyes:  Negative for discharge and redness.   Respiratory:  Positive for cough. Negative for shortness of breath.    Cardiovascular:  Negative for chest pain.   Gastrointestinal:  Positive for nausea. Negative for vomiting.   Musculoskeletal:  Positive for myalgias.   Skin:  Negative for rash.   Neurological:  Negative for dizziness.        Objective:   /78 (BP Location: Left arm, Patient Position: Sitting, BP Cuff Size: Adult)   Pulse (!) 116   Temp 37.7 °C (99.8 °F) (Temporal)   Resp 16   Wt 83.5 kg (184 lb)   SpO2 98%   BMI 28.82 kg/m²   Physical Exam  Vitals and  nursing note reviewed.   Constitutional:       General: She is not in acute distress.     Appearance: She is well-developed.   HENT:      Head: Normocephalic and atraumatic.      Right Ear: Tympanic membrane and external ear normal.      Left Ear: Tympanic membrane and external ear normal.      Nose: Rhinorrhea present.      Mouth/Throat:      Mouth: Mucous membranes are moist.      Pharynx: Posterior oropharyngeal erythema present. No oropharyngeal exudate.   Eyes:      Conjunctiva/sclera: Conjunctivae normal.   Cardiovascular:      Rate and Rhythm: Normal rate.   Pulmonary:      Effort: Pulmonary effort is normal. No respiratory distress.      Breath sounds: Normal breath sounds. No wheezing or rhonchi.   Abdominal:      General: There is no distension.   Musculoskeletal:         General: Normal range of motion.   Skin:     General: Skin is warm and dry.   Neurological:      General: No focal deficit present.      Mental Status: She is alert and oriented to person, place, and time. Mental status is at baseline.      Gait: Gait (gait at baseline) normal.   Psychiatric:         Judgment: Judgment normal.           Assessment/Plan:   1. Viral URI with cough  - POCT CEPHEID COV-2, FLU A/B, RSV - PCR    2. Pharyngitis, unspecified etiology  - POCT GROUP A STREP, PCR    3. Other fatigue  - POCT CEPHEID COV-2, FLU A/B, RSV - PCR    4. Fever, unspecified fever cause  - POCT CEPHEID COV-2, FLU A/B, RSV - PCR    5. Nausea  - ondansetron (Zofran ODT) dispertab 4 mg  - ondansetron (ZOFRAN) 4 MG Tab tablet; Take 1 Tablet by mouth every four hours as needed for Nausea/Vomiting for up to 3 days.  Dispense: 10 Tablet; Refill: 0    6. Acute cough  - benzonatate (TESSALON) 100 MG Cap; Take 1 Capsule by mouth 3 times a day as needed for Cough.  Dispense: 30 Capsule; Refill: 0    Medical decision-making/course: The patient remained afebrile, hemodynamically and neurologically stable with no evidence of respiratory compromise  throughout the urgent care course.  There was no immediate clinical indication for the necessity of emergency department evaluation or inpatient admission and the patient was amendable to a trial of outpatient management.        In the course of preparing for this visit with review of the pertinent past medical history, recent and past clinic visits, current medications, and performing chart, immunization, medical history and medication reconciliation, and in the further course of obtaining the current history pertinent to the clinic visit today, performing an exam and evaluation, ordering and independently evaluating labs, tests  including Influenza A, Influenza B, RSV, and SARS CoV-2 by PCR testing which was positive  , and/or procedures, prescribing any recommended new medications as noted above, providing any pertinent counseling and education and recommending further coordination of care including recommendations for symptomatic and supportive measures and drafting work excuse letter, at least  24 minutes of total time were spent during this encounter.      Discussed close monitoring, return precautions, and supportive measures of maintaining adequate fluid hydration and caloric intake, relative rest and symptom management as needed for pain and/or fever.    Differential diagnosis, natural history, supportive care, and indications for immediate follow-up discussed.     Advised the patient to follow-up with the primary care physician for recheck, reevaluation, and consideration of further management.    Please note that this dictation was created using voice recognition software. I have worked with consultants from the vendor as well as technical experts from [employer] to optimize the interface. I have made every reasonable attempt to correct obvious errors, but I expect that there are errors of grammar and possibly content that I did not discover before finalizing the note.         [1]   Current Outpatient  Medications:     benzonatate (TESSALON) 100 MG Cap, Take 1 Capsule by mouth 3 times a day as needed for Cough., Disp: 30 Capsule, Rfl: 0    ondansetron (ZOFRAN) 4 MG Tab tablet, Take 1 Tablet by mouth every four hours as needed for Nausea/Vomiting for up to 3 days., Disp: 10 Tablet, Rfl: 0    metoprolol tartrate (LOPRESSOR) 25 MG Tab, Take 1 Tablet by mouth see administration instructions. TAKE 2 TABLETS BY MOUTH ONCE DAILY IN THE MORNING AND 1 TABLET  IN THE EVENING and extra as needed for palpitations, Disp: 480 Tablet, Rfl: 8    ibuprofen (MOTRIN) 200 MG Tab, Take 800 mg by mouth every 6 hours as needed. Indications: Pain, Disp: , Rfl:     B Complex Vitamins (B COMPLEX PO), Take 1 Tablet by mouth see administration instructions. Pt takes sporidially, Disp: , Rfl:     Levonorgestrel (MIRENA, 52 MG, IU), 1 Each by Intrauterine route see administration instructions. Every 5 years, Disp: , Rfl:     tizanidine (ZANAFLEX) 4 MG Tab, Take 4 mg by mouth every 6 hours as needed. Indications: Musculoskeletal Pain, Disp: , Rfl:   [2] No Known Allergies  [3]   Past Surgical History:  Procedure Laterality Date    PELVISCOPY N/A 10/4/2017    Procedure: PELVISCOPY;  Surgeon: Eliza Carrizales M.D.;  Location: [facility];  Service: Gynecology    CARPAL TUNNEL RELEASE Right 01/2017    LAPAROSCOPY  7/22/2011    Performed by ROSS VELAZQUEZ at [facility]

## (undated) DEVICE — ELECTRODE 850 FOAM ADHESIVE - HYDROGEL RADIOTRNSPRNT (50/PK)

## (undated) DEVICE — CANISTER SUCTION 3000ML MECHANICAL FILTER AUTO SHUTOFF MEDI-VAC NONSTERILE LF DISP  (40EA/CA)

## (undated) DEVICE — TROCAR 5X100 BLADED Z-THREAD - KII (6/BX)

## (undated) DEVICE — SLEEVE, VASO, THIGH, MED

## (undated) DEVICE — GLOVE BIOGEL SZ 8 SURGICAL PF LTX - (50PR/BX 4BX/CA)

## (undated) DEVICE — NEEDLE INSFL 120MM 14GA VRRS - (20/BX)

## (undated) DEVICE — DRAPESURG STERI-DRAPE LONG - (10/BX 4BX/CA)

## (undated) DEVICE — GOWN WARMING STANDARD FLEX - (30/CA)

## (undated) DEVICE — SENSOR SPO2 NEO LNCS ADHESIVE (20/BX) SEE USER NOTES

## (undated) DEVICE — SET LEADWIRE 5 LEAD BEDSIDE DISPOSABLE ECG (1SET OF 5/EA)

## (undated) DEVICE — GLOVE BIOGEL SZ 6 PF LATEX - (50EA/BX 4BX/CA)

## (undated) DEVICE — SUTURE 0 VICRYL PLUS CT-2 - 27 INCH (36/BX)

## (undated) DEVICE — KIT ANESTHESIA W/CIRCUIT & 3/LT BAG W/FILTER (20EA/CA)

## (undated) DEVICE — PAD OR TABLE DA VINCI 2IN X 20IN X 72IN - (12EA/CA)

## (undated) DEVICE — PROTECTOR ULNA NERVE - (36PR/CA)

## (undated) DEVICE — GLOVE BIOGEL PI INDICATOR SZ 6.5 SURGICAL PF LF - (50/BX 4BX/CA)

## (undated) DEVICE — PACK LAPAROSCOPY - (1/CA)

## (undated) DEVICE — MASK ANESTHESIA ADULT  - (100/CA)

## (undated) DEVICE — ELECTRODE DUAL RETURN W/ CORD - (50/PK)

## (undated) DEVICE — TUBE E-T HI-LO CUFF 7.5MM (10EA/PK)

## (undated) DEVICE — SET SUCTION/IRRIGATION WITH DISPOSABLE TIP (6/CA )PART #0250-070-520 IS A SUB

## (undated) DEVICE — ARMREST CRADLE FOAM - (2PR/PK 12PR/CA)

## (undated) DEVICE — NEPTUNE 4 PORT MANIFOLD - (20/PK)

## (undated) DEVICE — SUCTION INSTRUMENT YANKAUER BULBOUS TIP W/O VENT (50EA/CA)

## (undated) DEVICE — SET EXTENSION WITH 2 PORTS (48EA/CA) ***PART #2C8610 IS A SUBSTITUTE*****

## (undated) DEVICE — TROCAR Z THREAD 12 X 100 - BLADED (6/BX)

## (undated) DEVICE — KIT ROOM DECONTAMINATION

## (undated) DEVICE — TUBING CLEARLINK DUO-VENT - C-FLO (48EA/CA)

## (undated) DEVICE — GLOVE BIOGEL SZ 6.5 SURGICAL PF LTX (50PR/BX 4BX/CA)

## (undated) DEVICE — LACTATED RINGERS INJ 1000 ML - (14EA/CA 60CA/PF)

## (undated) DEVICE — HEAD HOLDER JUNIOR/ADULT

## (undated) DEVICE — SUTURE GENERAL

## (undated) DEVICE — TRAY CATHETER FOLEY URINE METER W/STATLOCK 350ML (10EA/CA)

## (undated) DEVICE — TOWELS CLOTH SURGICAL - (4/PK 20PK/CA)

## (undated) DEVICE — SODIUM CHL IRRIGATION 0.9% 1000ML (12EA/CA)

## (undated) DEVICE — GLOVE BIOGEL SZ 8.5 SURGICAL PF LTX - (50PR/BX 4BX/CA)

## (undated) DEVICE — UTERINE MANIP RUMI 6.7X8 - (5/BX)